# Patient Record
Sex: MALE | Race: WHITE | Employment: OTHER | URBAN - METROPOLITAN AREA
[De-identification: names, ages, dates, MRNs, and addresses within clinical notes are randomized per-mention and may not be internally consistent; named-entity substitution may affect disease eponyms.]

---

## 2021-08-06 ENCOUNTER — HOSPITAL ENCOUNTER (EMERGENCY)
Age: 71
Discharge: NURSING, REHAB OR LONG-TERM CARE | End: 2021-08-07
Attending: EMERGENCY MEDICINE
Payer: MEDICARE

## 2021-08-06 DIAGNOSIS — R31.0 GROSS HEMATURIA: Primary | ICD-10-CM

## 2021-08-06 DIAGNOSIS — T83.511A URINARY TRACT INFECTION ASSOCIATED WITH INDWELLING URETHRAL CATHETER, INITIAL ENCOUNTER (HCC): ICD-10-CM

## 2021-08-06 DIAGNOSIS — N39.0 URINARY TRACT INFECTION ASSOCIATED WITH INDWELLING URETHRAL CATHETER, INITIAL ENCOUNTER (HCC): ICD-10-CM

## 2021-08-06 LAB
ALBUMIN SERPL-MCNC: 2.7 G/DL (ref 3.5–5)
ALBUMIN/GLOB SERPL: 0.7 {RATIO} (ref 1.1–2.2)
ALP SERPL-CCNC: 100 U/L (ref 45–117)
ALT SERPL-CCNC: 16 U/L (ref 12–78)
ANION GAP SERPL CALC-SCNC: 5 MMOL/L (ref 5–15)
AST SERPL-CCNC: 16 U/L (ref 15–37)
BASOPHILS # BLD: 0 K/UL (ref 0–0.1)
BASOPHILS NFR BLD: 0 % (ref 0–1)
BILIRUB SERPL-MCNC: 0.4 MG/DL (ref 0.2–1)
BUN SERPL-MCNC: 16 MG/DL (ref 6–20)
BUN/CREAT SERPL: 46 (ref 12–20)
CALCIUM SERPL-MCNC: 8.2 MG/DL (ref 8.5–10.1)
CHLORIDE SERPL-SCNC: 106 MMOL/L (ref 97–108)
CO2 SERPL-SCNC: 28 MMOL/L (ref 21–32)
COMMENT, HOLDF: NORMAL
CREAT SERPL-MCNC: 0.35 MG/DL (ref 0.7–1.3)
DIFFERENTIAL METHOD BLD: ABNORMAL
EOSINOPHIL # BLD: 0.2 K/UL (ref 0–0.4)
EOSINOPHIL NFR BLD: 3 % (ref 0–7)
ERYTHROCYTE [DISTWIDTH] IN BLOOD BY AUTOMATED COUNT: 17.3 % (ref 11.5–14.5)
GLOBULIN SER CALC-MCNC: 4.1 G/DL (ref 2–4)
GLUCOSE SERPL-MCNC: 100 MG/DL (ref 65–100)
HCT VFR BLD AUTO: 34.6 % (ref 36.6–50.3)
HGB BLD-MCNC: 10.3 G/DL (ref 12.1–17)
IMM GRANULOCYTES # BLD AUTO: 0 K/UL (ref 0–0.04)
IMM GRANULOCYTES NFR BLD AUTO: 0 % (ref 0–0.5)
LYMPHOCYTES # BLD: 1.6 K/UL (ref 0.8–3.5)
LYMPHOCYTES NFR BLD: 20 % (ref 12–49)
MCH RBC QN AUTO: 25.2 PG (ref 26–34)
MCHC RBC AUTO-ENTMCNC: 29.8 G/DL (ref 30–36.5)
MCV RBC AUTO: 84.6 FL (ref 80–99)
MONOCYTES # BLD: 0.7 K/UL (ref 0–1)
MONOCYTES NFR BLD: 9 % (ref 5–13)
NEUTS SEG # BLD: 5.5 K/UL (ref 1.8–8)
NEUTS SEG NFR BLD: 68 % (ref 32–75)
NRBC # BLD: 0 K/UL (ref 0–0.01)
NRBC BLD-RTO: 0 PER 100 WBC
PLATELET # BLD AUTO: 286 K/UL (ref 150–400)
PMV BLD AUTO: 9.5 FL (ref 8.9–12.9)
POTASSIUM SERPL-SCNC: 3.9 MMOL/L (ref 3.5–5.1)
PROT SERPL-MCNC: 6.8 G/DL (ref 6.4–8.2)
RBC # BLD AUTO: 4.09 M/UL (ref 4.1–5.7)
SAMPLES BEING HELD,HOLD: NORMAL
SODIUM SERPL-SCNC: 139 MMOL/L (ref 136–145)
WBC # BLD AUTO: 8 K/UL (ref 4.1–11.1)

## 2021-08-06 PROCEDURE — 99284 EMERGENCY DEPT VISIT MOD MDM: CPT

## 2021-08-06 PROCEDURE — 85025 COMPLETE CBC W/AUTO DIFF WBC: CPT

## 2021-08-06 PROCEDURE — 80053 COMPREHEN METABOLIC PANEL: CPT

## 2021-08-06 PROCEDURE — 36415 COLL VENOUS BLD VENIPUNCTURE: CPT

## 2021-08-07 VITALS
HEART RATE: 84 BPM | WEIGHT: 152.12 LBS | TEMPERATURE: 98.3 F | RESPIRATION RATE: 18 BRPM | BODY MASS INDEX: 20.6 KG/M2 | HEIGHT: 72 IN | DIASTOLIC BLOOD PRESSURE: 60 MMHG | SYSTOLIC BLOOD PRESSURE: 127 MMHG | OXYGEN SATURATION: 100 %

## 2021-08-07 LAB
APPEARANCE UR: ABNORMAL
BACTERIA URNS QL MICRO: NEGATIVE /HPF
BILIRUB UR QL: NEGATIVE
COLOR UR: ABNORMAL
EPITH CASTS URNS QL MICRO: ABNORMAL /LPF
GLUCOSE UR STRIP.AUTO-MCNC: NEGATIVE MG/DL
HGB UR QL STRIP: ABNORMAL
KETONES UR QL STRIP.AUTO: ABNORMAL MG/DL
LEUKOCYTE ESTERASE UR QL STRIP.AUTO: ABNORMAL
NITRITE UR QL STRIP.AUTO: NEGATIVE
PH UR STRIP: 6.5 [PH] (ref 5–8)
PROT UR STRIP-MCNC: 100 MG/DL
RBC #/AREA URNS HPF: >100 /HPF (ref 0–5)
SP GR UR REFRACTOMETRY: 1.01 (ref 1–1.03)
UA: UC IF INDICATED,UAUC: ABNORMAL
UROBILINOGEN UR QL STRIP.AUTO: 0.2 EU/DL (ref 0.2–1)
WBC URNS QL MICRO: ABNORMAL /HPF (ref 0–4)

## 2021-08-07 PROCEDURE — 74011250637 HC RX REV CODE- 250/637: Performed by: EMERGENCY MEDICINE

## 2021-08-07 PROCEDURE — 81001 URINALYSIS AUTO W/SCOPE: CPT

## 2021-08-07 RX ORDER — LEVOFLOXACIN 750 MG/1
750 TABLET ORAL
Status: COMPLETED | OUTPATIENT
Start: 2021-08-07 | End: 2021-08-07

## 2021-08-07 RX ORDER — LEVOFLOXACIN 750 MG/1
750 TABLET ORAL DAILY
Qty: 4 TABLET | Refills: 0 | Status: SHIPPED | OUTPATIENT
Start: 2021-08-07 | End: 2021-08-11

## 2021-08-07 RX ADMIN — LEVOFLOXACIN 750 MG: 750 TABLET, FILM COATED ORAL at 02:23

## 2021-08-07 NOTE — ED NOTES
Pt came to ER for c/c of hematuria. Pt's bladder has been irrigated with 3 way mauro catheter as per order. Pt's urine colour has started to lighten up and not as red as it was POA. Urine flow is steady. Pt discharged with mauro. Called Pt's nursing facility to give report, but nurse was not available. Callback number given.  Pt left with [facility]

## 2021-08-07 NOTE — ED PROVIDER NOTES
EMERGENCY DEPARTMENT HISTORY AND PHYSICAL EXAM      Date: 8/6/2021  Patient Name: Lizzette Koehler    History of Presenting Illness     Chief Complaint   Patient presents with    Blood in Urine     Pt came from a nursing facility for c/c of hematuria. Pt has a chronic mauro and has gross hematuria which started today. Pt has hx of CVA, bed bound, Multiple sclerosis and has bed sores. pt takes 81 mg of aspirin       History Provided By: Patient    HPI: Lizzette Koehler, 80 y.o. male  With past medical history of MS and previous drug presenting today with gross hematuria. The patient does have a chronic indwelling Mauro due to his past history of MS. He started having hematuria today. The patient does not have any change to his baseline pain. He is not having any severe abdominal discomfort. He takes 81 mg of aspirin but no other blood thinners. The patient does not have any further complaints. No fevers or chills. There are no other complaints, changes, or physical findings at this time. PCP: Tim Valente MD    No current facility-administered medications on file prior to encounter. Current Outpatient Medications on File Prior to Encounter   Medication Sig Dispense Refill    glatiramer (COPAXONE) 20 mg/mL injection 20 mg by SubCUTAneous route daily. metoprolol tartrate (LOPRESSOR) 25 mg tablet Take 25 mg by mouth daily. nitrofurantoin (MACRODANTIN) 25 mg capsule Take 25 mg by mouth daily. Indications: PREVENTION OF BACTERIAL URINARY TRACT INFECTION      finasteride (PROSCAR) 5 mg tablet Take 5 mg by mouth daily. tamsulosin (FLOMAX) 0.4 mg capsule Take 0.4 mg by mouth daily. traMADol (ULTRAM) 50 mg tablet Take 1 Tab by mouth every six (6) hours as needed for Pain. Max Daily Amount: 200 mg. 10 Tab 0    levofloxacin (LEVAQUIN) 500 mg tablet Take 1 Tab by mouth daily. 7 Tab 0    Aspirin, Buffered 81 mg tab Take 1 Tab by mouth daily.       ranitidine (ZANTAC) 300 mg tablet Take 300 mg by mouth daily. nitroglycerin (NITROSTAT) 0.4 mg SL tablet by SubLINGual route every five (5) minutes as needed for Chest Pain. acetaminophen (TYLENOL) 325 mg tablet Take  by mouth every four (4) hours as needed for Pain. ibuprofen (MOTRIN) 600 mg tablet Take  by mouth every eight (8) hours as needed for Pain. atorvastatin (LIPITOR) 40 mg tablet Take  by mouth daily. pyridoxine (VITAMIN B-6) 50 mg tablet Take  by mouth daily. Past History     Past Medical History:  Past Medical History:   Diagnosis Date    MS (multiple sclerosis) ([facility] [address]     Stroke [facility])        Past Surgical History:  Past Surgical History:   Procedure Laterality Date    HX CORONARY STENT PLACEMENT      x7        Family History:  No family history on file. Social History:  Social History     Tobacco Use    Smoking status: Never Smoker   Substance Use Topics    Alcohol use: No    Drug use: Not on file       Allergies: Allergies   Allergen Reactions    Beeswax Anaphylaxis    Bee Pollen Unknown (comments)    Simvastatin Not Reported This Time    Tolterodine Not Reported This Time    Venom-Wasp Not Reported This Time         Review of Systems   Constitutional: No  fever  Skin: No  rash  HEENT: No  nasal congestion  Resp: No cough  CV: No chest pain  GI: No vomiting  : No dysuria  MSK: No joint pain  Neuro: No numbness  Psych: No anxiety      Physical Exam     Patient Vitals for the past 12 hrs:   Temp Pulse Resp BP SpO2   08/07/21 0200 98.3 °F (36.8 °C) 84 18 127/60 --   08/06/21 2139 -- -- -- -- 100 %   08/06/21 2138 98.3 °F (36.8 °C) 84 18 130/67 100 %       General: alert, No acute distress  Eyes: EOMI, normal conjunctiva  ENT: moist mucous membranes. Neck: Active, full ROM of neck. Skin: No rashes. no jaundice              Lungs: Equal chest expansion. no respiratory distress.    Heart: regular rate     no peripheral edema    Abd:  non distended soft, mauro catheter in place with gross hematuria  Back: Full ROM  MSK: contractures, muscular atrophy  Neuro: alert  Person, Place, Time, and Situation; normal speech;   Psych: Cooperative with exam; Appropriate mood and affect             Diagnostic Study Results     Labs -     Recent Results (from the past 12 hour(s))   CBC WITH AUTOMATED DIFF    Collection Time: 08/06/21 10:33 PM   Result Value Ref Range    WBC 8.0 4.1 - 11.1 K/uL    RBC 4.09 (L) 4.10 - 5.70 M/uL    HGB 10.3 (L) 12.1 - 17.0 g/dL    HCT 34.6 (L) 36.6 - 50.3 %    MCV 84.6 80.0 - 99.0 FL    MCH 25.2 (L) 26.0 - 34.0 PG    MCHC 29.8 (L) 30.0 - 36.5 g/dL    RDW 17.3 (H) 11.5 - 14.5 %    PLATELET 347 055 - 089 K/uL    MPV 9.5 8.9 - 12.9 FL    NRBC 0.0 0  WBC    ABSOLUTE NRBC 0.00 0.00 - 0.01 K/uL    NEUTROPHILS 68 32 - 75 %    LYMPHOCYTES 20 12 - 49 %    MONOCYTES 9 5 - 13 %    EOSINOPHILS 3 0 - 7 %    BASOPHILS 0 0 - 1 %    IMMATURE GRANULOCYTES 0 0.0 - 0.5 %    ABS. NEUTROPHILS 5.5 1.8 - 8.0 K/UL    ABS. LYMPHOCYTES 1.6 0.8 - 3.5 K/UL    ABS. MONOCYTES 0.7 0.0 - 1.0 K/UL    ABS. EOSINOPHILS 0.2 0.0 - 0.4 K/UL    ABS. BASOPHILS 0.0 0.0 - 0.1 K/UL    ABS. IMM. GRANS. 0.0 0.00 - 0.04 K/UL    DF AUTOMATED     METABOLIC PANEL, COMPREHENSIVE    Collection Time: 08/06/21 10:33 PM   Result Value Ref Range    Sodium 139 136 - 145 mmol/L    Potassium 3.9 3.5 - 5.1 mmol/L    Chloride 106 97 - 108 mmol/L    CO2 28 21 - 32 mmol/L    Anion gap 5 5 - 15 mmol/L    Glucose 100 65 - 100 mg/dL    BUN 16 6 - 20 MG/DL    Creatinine 0.35 (L) 0.70 - 1.30 MG/DL    BUN/Creatinine ratio 46 (H) 12 - 20      GFR est AA >60 >60 ml/min/1.73m2    GFR est non-AA >60 >60 ml/min/1.73m2    Calcium 8.2 (L) 8.5 - 10.1 MG/DL    Bilirubin, total 0.4 0.2 - 1.0 MG/DL    ALT (SGPT) 16 12 - 78 U/L    AST (SGOT) 16 15 - 37 U/L    Alk.  phosphatase 100 45 - 117 U/L    Protein, total 6.8 6.4 - 8.2 g/dL    Albumin 2.7 (L) 3.5 - 5.0 g/dL    Globulin 4.1 (H) 2.0 - 4.0 g/dL    A-G Ratio 0.7 (L) 1.1 - 2.2     SAMPLES BEING HELD    Collection Time: 08/06/21 10:33 PM   Result Value Ref Range    SAMPLES BEING HELD BL     COMMENT        Add-on orders for these samples will be processed based on acceptable specimen integrity and analyte stability, which may vary by analyte. URINALYSIS W/ REFLEX CULTURE    Collection Time: 08/07/21  1:05 AM    Specimen: Urine   Result Value Ref Range    Color RED      Appearance TURBID (A) CLEAR      Specific gravity 1.006 1.003 - 1.030      pH (UA) 6.5 5.0 - 8.0      Protein 100 (A) NEG mg/dL    Glucose Negative NEG mg/dL    Ketone TRACE (A) NEG mg/dL    Bilirubin Negative NEG      Blood LARGE (A) NEG      Urobilinogen 0.2 0.2 - 1.0 EU/dL    Nitrites Negative NEG      Leukocyte Esterase SMALL (A) NEG      WBC 5-10 0 - 4 /hpf    RBC >100 (H) 0 - 5 /hpf    Epithelial cells FEW FEW /lpf    Bacteria Negative NEG /hpf    UA:UC IF INDICATED CULTURE NOT INDICATED BY UA RESULT CNI         Radiologic Studies -   No orders to display     CT Results  (Last 48 hours)      None          CXR Results  (Last 48 hours)      None            Medical Decision Making   I am the first provider for this patient. I reviewed the vital signs, available nursing notes, past medical history, past surgical history, family history and social history. Vital Signs-Reviewed the patient's vital signs. Patient Vitals for the past 12 hrs:   Temp Pulse Resp BP SpO2   08/07/21 0200 98.3 °F (36.8 °C) 84 18 127/60 --   08/06/21 2139 -- -- -- -- 100 %   08/06/21 2138 98.3 °F (36.8 °C) 84 18 130/67 100 %       Provider Notes (Medical Decision Making):     Differential Diagnosis: Anemia, electrolyte abnormality, gross hematuria, urinary tract infection    Initial Plan: Will obtain urinalysis sample, irrigate the patient's [place], obtain laboratory studies and reassess. He is hemodynamically stable without significant symptoms at this time. ED Course:   Initial assessment performed.  The patients presenting problems have been discussed, and they are in agreement with the care plan formulated and outlined with them. I have encouraged them to ask questions as they arise throughout their visit. ED Course as of Aug 07 0421   Sat Aug 07, 2021   0142 On my interpretation of the patient's laboratory studies urinalysis shows evidence of a large amount of blood and also evidence of urinary tract infection, hemoglobin is 10.3, no elevation white blood cell count, metabolic panel unremarkable. [NW]   4714 Patient presenting today with gross hematuria, the bladder was irrigated and bleeding lessened. Patient has evidence of UTI. Will discharge with Levaquin. He has no evidence of a systemic infection. [NW]      ED Course User Index  [NW] Jaime Jefferson MD       I, Imani Zuniga MD, am the attending of record for this patient encounter. Dispo: Discharged. The patient has been re-evaluated and is ready for discharge. Reviewed available results with patient. Counseled patient on diagnosis and care plan. Patient has expressed understanding, and all questions have been answered. Patient agrees with plan and agrees to follow up as recommended, or to return to the ED if their symptoms worsen. Discharge instructions have been provided and explained to the patient, along with reasons to return to the ED. PLAN:  Discharge Medication List as of 8/7/2021  1:42 AM        START taking these medications    Details   !! levoFLOXacin (LEVAQUIN) 750 mg tablet Take 1 Tablet by mouth daily for 4 days. , Print, Disp-4 Tablet, R-0       !! - Potential duplicate medications found. Please discuss with provider. CONTINUE these medications which have NOT CHANGED    Details   glatiramer (COPAXONE) 20 mg/mL injection 20 mg by SubCUTAneous route daily. , Historical Med      metoprolol tartrate (LOPRESSOR) 25 mg tablet Take 25 mg by mouth daily. , Historical Med      nitrofurantoin (MACRODANTIN) 25 mg capsule Take 25 mg by mouth daily.  Indications: PREVENTION OF BACTERIAL URINARY TRACT INFECTION, Historical Med      finasteride (PROSCAR) 5 mg tablet Take 5 mg by mouth daily. , Historical Med      tamsulosin (FLOMAX) 0.4 mg capsule Take 0.4 mg by mouth daily. , Historical Med      traMADol (ULTRAM) 50 mg tablet Take 1 Tab by mouth every six (6) hours as needed for Pain. Max Daily Amount: 200 mg., Print, Disp-10 Tab, R-0      !! levofloxacin (LEVAQUIN) 500 mg tablet Take 1 Tab by mouth daily. , Print, Disp-7 Tab, R-0      Aspirin, Buffered 81 mg tab Take 1 Tab by mouth daily. , Historical Med      ranitidine (ZANTAC) 300 mg tablet Take 300 mg by mouth daily. , Historical Med      nitroglycerin (NITROSTAT) 0.4 mg SL tablet by SubLINGual route every five (5) minutes as needed for Chest Pain., Historical Med      acetaminophen (TYLENOL) 325 mg tablet Take  by mouth every four (4) hours as needed for Pain., Historical Med      ibuprofen (MOTRIN) 600 mg tablet Take  by mouth every eight (8) hours as needed for Pain., Historical Med      atorvastatin (LIPITOR) 40 mg tablet Take  by mouth daily. , Historical Med      pyridoxine (VITAMIN B-6) 50 mg tablet Take  by mouth daily. , Historical Med       !! - Potential duplicate medications found. Please discuss with provider. 2.     Follow-up Information       Follow up With Specialties Details Why Sigifredo, [address] [place] [address] MD [facility]   [address] [place] [address] [phone]      Tressa Hope MD Urology  Hematuria formerly [facility] [address]  [address] [facility]  [phone]            3. Return to ED if worse       Diagnosis     Clinical Impression:   1. Gross hematuria    2. Urinary tract infection associated with indwelling urethral catheter, initial encounter [facility])        Attestations:    Beth Ayoub MD    Please note that this dictation was completed with [employer], the [employer] voice recognition software.   Quite often unanticipated grammatical, syntax, homophones, and other interpretive errors are inadvertently transcribed by the computer software. Please disregard these errors. Please excuse any errors that have escaped final proofreading. Thank you.

## 2022-02-09 ENCOUNTER — APPOINTMENT (OUTPATIENT)
Dept: GENERAL RADIOLOGY | Age: 72
DRG: 871 | End: 2022-02-09
Attending: EMERGENCY MEDICINE
Payer: MEDICARE

## 2022-02-09 ENCOUNTER — HOSPITAL ENCOUNTER (INPATIENT)
Age: 72
LOS: 9 days | Discharge: NURSING, REHAB OR LONG-TERM CARE | DRG: 871 | End: 2022-02-18
Attending: EMERGENCY MEDICINE | Admitting: HOSPITALIST
Payer: MEDICARE

## 2022-02-09 ENCOUNTER — APPOINTMENT (OUTPATIENT)
Dept: CT IMAGING | Age: 72
DRG: 871 | End: 2022-02-09
Attending: EMERGENCY MEDICINE
Payer: MEDICARE

## 2022-02-09 DIAGNOSIS — E43 SEVERE PROTEIN-CALORIE MALNUTRITION (HCC): ICD-10-CM

## 2022-02-09 DIAGNOSIS — I33.0 MITRAL VALVE VEGETATION: ICD-10-CM

## 2022-02-09 DIAGNOSIS — R13.19 ESOPHAGEAL DYSPHAGIA: ICD-10-CM

## 2022-02-09 DIAGNOSIS — G35 MULTIPLE SCLEROSIS (HCC): ICD-10-CM

## 2022-02-09 DIAGNOSIS — R53.2 FUNCTIONAL QUADRIPLEGIA (HCC): ICD-10-CM

## 2022-02-09 DIAGNOSIS — B96.4 URINARY TRACT INFECTION DUE TO PROTEUS: ICD-10-CM

## 2022-02-09 DIAGNOSIS — L89.154 PRESSURE INJURY OF SACRAL REGION, STAGE 4 (HCC): ICD-10-CM

## 2022-02-09 DIAGNOSIS — L03.317 CELLULITIS OF BUTTOCK: Primary | ICD-10-CM

## 2022-02-09 DIAGNOSIS — R05.3 CHRONIC COUGH: ICD-10-CM

## 2022-02-09 DIAGNOSIS — T83.518A CATHETER CYSTITIS, INITIAL ENCOUNTER (HCC): ICD-10-CM

## 2022-02-09 DIAGNOSIS — N31.9 NEUROGENIC BLADDER: ICD-10-CM

## 2022-02-09 DIAGNOSIS — N30.90 CATHETER CYSTITIS, INITIAL ENCOUNTER (HCC): ICD-10-CM

## 2022-02-09 DIAGNOSIS — N39.0 URINARY TRACT INFECTION DUE TO PROTEUS: ICD-10-CM

## 2022-02-09 DIAGNOSIS — R78.81 STREPTOCOCCAL BACTEREMIA: ICD-10-CM

## 2022-02-09 DIAGNOSIS — M86.60 CHRONIC OSTEOMYELITIS (HCC): ICD-10-CM

## 2022-02-09 DIAGNOSIS — A49.8 PROTEUS MIRABILIS INFECTION: ICD-10-CM

## 2022-02-09 DIAGNOSIS — A49.9 GRAM-NEGATIVE INFECTION: ICD-10-CM

## 2022-02-09 DIAGNOSIS — R78.81 GRAM-NEGATIVE BACTEREMIA: ICD-10-CM

## 2022-02-09 DIAGNOSIS — M46.28 SACRAL OSTEOMYELITIS (HCC): ICD-10-CM

## 2022-02-09 DIAGNOSIS — R78.81 BACTEREMIA DUE TO GRAM-NEGATIVE BACTERIA: ICD-10-CM

## 2022-02-09 DIAGNOSIS — B95.5 STREPTOCOCCAL BACTEREMIA: ICD-10-CM

## 2022-02-09 DIAGNOSIS — M86.159 ACUTE OSTEOMYELITIS OF PELVIC REGION, UNSPECIFIED LATERALITY (HCC): ICD-10-CM

## 2022-02-09 PROBLEM — A41.9 SEPSIS (HCC): Status: ACTIVE | Noted: 2022-02-09

## 2022-02-09 PROBLEM — L89.159 SACRAL PRESSURE ULCER: Status: ACTIVE | Noted: 2022-02-09

## 2022-02-09 LAB
ALBUMIN SERPL-MCNC: 2.3 G/DL (ref 3.5–5)
ALBUMIN/GLOB SERPL: 0.5 {RATIO} (ref 1.1–2.2)
ALP SERPL-CCNC: 90 U/L (ref 45–117)
ALT SERPL-CCNC: 15 U/L (ref 12–78)
ANION GAP SERPL CALC-SCNC: 5 MMOL/L (ref 5–15)
APPEARANCE UR: ABNORMAL
AST SERPL-CCNC: 24 U/L (ref 15–37)
BACTERIA URNS QL MICRO: ABNORMAL /HPF
BASOPHILS # BLD: 0 K/UL (ref 0–0.1)
BASOPHILS NFR BLD: 0 % (ref 0–1)
BILIRUB SERPL-MCNC: 0.4 MG/DL (ref 0.2–1)
BILIRUB UR QL: NEGATIVE
BUN SERPL-MCNC: 23 MG/DL (ref 6–20)
BUN/CREAT SERPL: 59 (ref 12–20)
CALCIUM SERPL-MCNC: 8.3 MG/DL (ref 8.5–10.1)
CHLORIDE SERPL-SCNC: 100 MMOL/L (ref 97–108)
CO2 SERPL-SCNC: 31 MMOL/L (ref 21–32)
COLOR UR: ABNORMAL
COVID-19 RAPID TEST, COVR: NOT DETECTED
CREAT SERPL-MCNC: 0.39 MG/DL (ref 0.7–1.3)
DIFFERENTIAL METHOD BLD: ABNORMAL
EOSINOPHIL # BLD: 0 K/UL (ref 0–0.4)
EOSINOPHIL NFR BLD: 0 % (ref 0–7)
EPITH CASTS URNS QL MICRO: ABNORMAL /LPF
ERYTHROCYTE [DISTWIDTH] IN BLOOD BY AUTOMATED COUNT: 16.9 % (ref 11.5–14.5)
GLOBULIN SER CALC-MCNC: 4.6 G/DL (ref 2–4)
GLUCOSE SERPL-MCNC: 110 MG/DL (ref 65–100)
GLUCOSE UR STRIP.AUTO-MCNC: NEGATIVE MG/DL
HCT VFR BLD AUTO: 33.1 % (ref 36.6–50.3)
HGB BLD-MCNC: 10.2 G/DL (ref 12.1–17)
HGB UR QL STRIP: ABNORMAL
IMM GRANULOCYTES # BLD AUTO: 0.1 K/UL (ref 0–0.04)
IMM GRANULOCYTES NFR BLD AUTO: 1 % (ref 0–0.5)
KETONES UR QL STRIP.AUTO: 15 MG/DL
LEUKOCYTE ESTERASE UR QL STRIP.AUTO: ABNORMAL
LYMPHOCYTES # BLD: 0.4 K/UL (ref 0.8–3.5)
LYMPHOCYTES NFR BLD: 3 % (ref 12–49)
MCH RBC QN AUTO: 23.7 PG (ref 26–34)
MCHC RBC AUTO-ENTMCNC: 30.8 G/DL (ref 30–36.5)
MCV RBC AUTO: 76.8 FL (ref 80–99)
MONOCYTES # BLD: 0.6 K/UL (ref 0–1)
MONOCYTES NFR BLD: 5 % (ref 5–13)
NEUTS SEG # BLD: 10.7 K/UL (ref 1.8–8)
NEUTS SEG NFR BLD: 91 % (ref 32–75)
NITRITE UR QL STRIP.AUTO: NEGATIVE
NRBC # BLD: 0 K/UL (ref 0–0.01)
NRBC BLD-RTO: 0 PER 100 WBC
PH UR STRIP: 8 [PH] (ref 5–8)
PLATELET # BLD AUTO: 346 K/UL (ref 150–400)
PMV BLD AUTO: 9.8 FL (ref 8.9–12.9)
POTASSIUM SERPL-SCNC: 4.2 MMOL/L (ref 3.5–5.1)
PROT SERPL-MCNC: 6.9 G/DL (ref 6.4–8.2)
PROT UR STRIP-MCNC: 100 MG/DL
RBC # BLD AUTO: 4.31 M/UL (ref 4.1–5.7)
RBC #/AREA URNS HPF: ABNORMAL /HPF (ref 0–5)
RBC MORPH BLD: ABNORMAL
RBC MORPH BLD: ABNORMAL
SODIUM SERPL-SCNC: 136 MMOL/L (ref 136–145)
SOURCE, COVRS: NORMAL
SP GR UR REFRACTOMETRY: 1.03 (ref 1–1.03)
TROPONIN-HIGH SENSITIVITY: 14 NG/L (ref 0–76)
UA: UC IF INDICATED,UAUC: ABNORMAL
UROBILINOGEN UR QL STRIP.AUTO: 1 EU/DL (ref 0.2–1)
WBC # BLD AUTO: 11.8 K/UL (ref 4.1–11.1)
WBC URNS QL MICRO: ABNORMAL /HPF (ref 0–4)

## 2022-02-09 PROCEDURE — 96367 TX/PROPH/DG ADDL SEQ IV INF: CPT

## 2022-02-09 PROCEDURE — 71045 X-RAY EXAM CHEST 1 VIEW: CPT

## 2022-02-09 PROCEDURE — 87635 SARS-COV-2 COVID-19 AMP PRB: CPT

## 2022-02-09 PROCEDURE — 83605 ASSAY OF LACTIC ACID: CPT

## 2022-02-09 PROCEDURE — 87077 CULTURE AEROBIC IDENTIFY: CPT

## 2022-02-09 PROCEDURE — 87040 BLOOD CULTURE FOR BACTERIA: CPT

## 2022-02-09 PROCEDURE — 81001 URINALYSIS AUTO W/SCOPE: CPT

## 2022-02-09 PROCEDURE — 87186 SC STD MICRODIL/AGAR DIL: CPT

## 2022-02-09 PROCEDURE — 65270000029 HC RM PRIVATE

## 2022-02-09 PROCEDURE — 87086 URINE CULTURE/COLONY COUNT: CPT

## 2022-02-09 PROCEDURE — 84484 ASSAY OF TROPONIN QUANT: CPT

## 2022-02-09 PROCEDURE — 96365 THER/PROPH/DIAG IV INF INIT: CPT

## 2022-02-09 PROCEDURE — 85025 COMPLETE CBC W/AUTO DIFF WBC: CPT

## 2022-02-09 PROCEDURE — 36415 COLL VENOUS BLD VENIPUNCTURE: CPT

## 2022-02-09 PROCEDURE — 74011000636 HC RX REV CODE- 636: Performed by: EMERGENCY MEDICINE

## 2022-02-09 PROCEDURE — 99285 EMERGENCY DEPT VISIT HI MDM: CPT

## 2022-02-09 PROCEDURE — 74011250636 HC RX REV CODE- 250/636: Performed by: EMERGENCY MEDICINE

## 2022-02-09 PROCEDURE — 74011000258 HC RX REV CODE- 258: Performed by: EMERGENCY MEDICINE

## 2022-02-09 PROCEDURE — 72193 CT PELVIS W/DYE: CPT

## 2022-02-09 PROCEDURE — 93005 ELECTROCARDIOGRAM TRACING: CPT

## 2022-02-09 PROCEDURE — 80053 COMPREHEN METABOLIC PANEL: CPT

## 2022-02-09 PROCEDURE — 96368 THER/DIAG CONCURRENT INF: CPT

## 2022-02-09 RX ORDER — SERTRALINE HYDROCHLORIDE 25 MG/1
25 TABLET, FILM COATED ORAL DAILY
Status: DISCONTINUED | OUTPATIENT
Start: 2022-02-10 | End: 2022-02-18 | Stop reason: HOSPADM

## 2022-02-09 RX ORDER — DEXTROSE MONOHYDRATE 100 MG/ML
250 INJECTION, SOLUTION INTRAVENOUS AS NEEDED
Status: DISCONTINUED | OUTPATIENT
Start: 2022-02-09 | End: 2022-02-10 | Stop reason: SDUPTHER

## 2022-02-09 RX ORDER — OXYCODONE HYDROCHLORIDE 5 MG/1
5 TABLET ORAL
Status: DISCONTINUED | OUTPATIENT
Start: 2022-02-09 | End: 2022-02-10

## 2022-02-09 RX ORDER — GUAIFENESIN 100 MG/5ML
200 SOLUTION ORAL
Status: DISCONTINUED | OUTPATIENT
Start: 2022-02-09 | End: 2022-02-18 | Stop reason: HOSPADM

## 2022-02-09 RX ORDER — VANCOMYCIN 1.75 GRAM/500 ML IN 0.9 % SODIUM CHLORIDE INTRAVENOUS
1750
Status: COMPLETED | OUTPATIENT
Start: 2022-02-09 | End: 2022-02-09

## 2022-02-09 RX ORDER — INSULIN LISPRO 100 [IU]/ML
INJECTION, SOLUTION INTRAVENOUS; SUBCUTANEOUS
Status: DISCONTINUED | OUTPATIENT
Start: 2022-02-09 | End: 2022-02-18 | Stop reason: HOSPADM

## 2022-02-09 RX ORDER — IPRATROPIUM BROMIDE AND ALBUTEROL SULFATE 2.5; .5 MG/3ML; MG/3ML
3 SOLUTION RESPIRATORY (INHALATION)
Status: DISCONTINUED | OUTPATIENT
Start: 2022-02-09 | End: 2022-02-18 | Stop reason: HOSPADM

## 2022-02-09 RX ORDER — DRONABINOL 2.5 MG/1
2.5 CAPSULE ORAL
Status: DISCONTINUED | OUTPATIENT
Start: 2022-02-10 | End: 2022-02-18 | Stop reason: HOSPADM

## 2022-02-09 RX ORDER — MIRTAZAPINE 15 MG/1
30 TABLET, FILM COATED ORAL
Status: DISCONTINUED | OUTPATIENT
Start: 2022-02-09 | End: 2022-02-18 | Stop reason: HOSPADM

## 2022-02-09 RX ORDER — SODIUM CHLORIDE 0.9 % (FLUSH) 0.9 %
5-10 SYRINGE (ML) INJECTION AS NEEDED
Status: DISCONTINUED | OUTPATIENT
Start: 2022-02-09 | End: 2022-02-18 | Stop reason: HOSPADM

## 2022-02-09 RX ORDER — ENOXAPARIN SODIUM 100 MG/ML
40 INJECTION SUBCUTANEOUS DAILY
Status: DISCONTINUED | OUTPATIENT
Start: 2022-02-10 | End: 2022-02-18 | Stop reason: HOSPADM

## 2022-02-09 RX ORDER — GLATIRAMER ACETATE 20 MG/ML
20 INJECTION, SOLUTION SUBCUTANEOUS
Status: DISCONTINUED | OUTPATIENT
Start: 2022-02-11 | End: 2022-02-18 | Stop reason: HOSPADM

## 2022-02-09 RX ORDER — ACETAMINOPHEN 325 MG/1
650 TABLET ORAL
Status: DISCONTINUED | OUTPATIENT
Start: 2022-02-09 | End: 2022-02-14

## 2022-02-09 RX ORDER — ONDANSETRON 2 MG/ML
4 INJECTION INTRAMUSCULAR; INTRAVENOUS
Status: DISCONTINUED | OUTPATIENT
Start: 2022-02-09 | End: 2022-02-18 | Stop reason: HOSPADM

## 2022-02-09 RX ORDER — LEVOFLOXACIN 5 MG/ML
750 INJECTION, SOLUTION INTRAVENOUS EVERY 24 HOURS
Status: DISCONTINUED | OUTPATIENT
Start: 2022-02-09 | End: 2022-02-11

## 2022-02-09 RX ORDER — MAGNESIUM SULFATE 100 %
4 CRYSTALS MISCELLANEOUS AS NEEDED
Status: DISCONTINUED | OUTPATIENT
Start: 2022-02-09 | End: 2022-02-18 | Stop reason: HOSPADM

## 2022-02-09 RX ORDER — AMOXICILLIN 250 MG
2 CAPSULE ORAL
Status: DISCONTINUED | OUTPATIENT
Start: 2022-02-09 | End: 2022-02-18 | Stop reason: HOSPADM

## 2022-02-09 RX ORDER — GUAIFENESIN 100 MG/5ML
81 LIQUID (ML) ORAL DAILY
Status: DISCONTINUED | OUTPATIENT
Start: 2022-02-10 | End: 2022-02-18 | Stop reason: HOSPADM

## 2022-02-09 RX ORDER — SODIUM CHLORIDE 9 MG/ML
50 INJECTION, SOLUTION INTRAVENOUS CONTINUOUS
Status: DISCONTINUED | OUTPATIENT
Start: 2022-02-09 | End: 2022-02-17

## 2022-02-09 RX ORDER — LEVOFLOXACIN 5 MG/ML
750 INJECTION, SOLUTION INTRAVENOUS
Status: DISCONTINUED | OUTPATIENT
Start: 2022-02-09 | End: 2022-02-10 | Stop reason: SDUPTHER

## 2022-02-09 RX ORDER — PANTOPRAZOLE SODIUM 40 MG/1
40 TABLET, DELAYED RELEASE ORAL
Status: DISCONTINUED | OUTPATIENT
Start: 2022-02-10 | End: 2022-02-18 | Stop reason: HOSPADM

## 2022-02-09 RX ADMIN — VANCOMYCIN HYDROCHLORIDE 1750 MG: 100 INJECTION, POWDER, LYOPHILIZED, FOR SOLUTION INTRAVENOUS at 21:19

## 2022-02-09 RX ADMIN — SODIUM CHLORIDE 1000 ML: 9 INJECTION, SOLUTION INTRAVENOUS at 19:38

## 2022-02-09 RX ADMIN — CEFEPIME HYDROCHLORIDE 2 G: 2 INJECTION, POWDER, FOR SOLUTION INTRAVENOUS at 19:37

## 2022-02-09 RX ADMIN — IOPAMIDOL 100 ML: 755 INJECTION, SOLUTION INTRAVENOUS at 20:43

## 2022-02-09 RX ADMIN — LEVOFLOXACIN 750 MG: 5 INJECTION, SOLUTION INTRAVENOUS at 20:18

## 2022-02-09 NOTE — ED PROVIDER NOTES
EMERGENCY DEPARTMENT HISTORY AND PHYSICAL EXAM      Date: 2/9/2022  Patient Name: Johan Berkowitz  Patient Age and Sex: 80 y.o. male     History of Presenting Illness     Chief Complaint   Patient presents with    Wound Check     Pt BIB EMS from [address] for eval of multiple bed sores (hips, buttock, feet)     History Provided By: Patient    HPI: Johan Berkowitz is a 80year old history DM, MS, Stroke presenting with wounds and fever. Patient is a resident of [facility], found to have fever and tachycardia this AM with worsening of his chronic wounds to sacrum, heels. Patient sating 90% on room air, placed on o2. Only oriented to self, stable neuro exam. Patient tachycardic to 126, /70, fecvbrile to 101.6 en route. Unknown vaccination status. Patient reports pain to his bottom which has been worsening over the past week approximately. There are no other complaints, changes, or physical findings at this time. PCP: Ami Nguyen MD    No current facility-administered medications on file prior to encounter. Current Outpatient Medications on File Prior to Encounter   Medication Sig Dispense Refill    glatiramer (COPAXONE) 20 mg/mL injection 20 mg by SubCUTAneous route daily.  metoprolol tartrate (LOPRESSOR) 25 mg tablet Take 25 mg by mouth daily.  nitrofurantoin (MACRODANTIN) 25 mg capsule Take 25 mg by mouth daily. Indications: PREVENTION OF BACTERIAL URINARY TRACT INFECTION      finasteride (PROSCAR) 5 mg tablet Take 5 mg by mouth daily.  tamsulosin (FLOMAX) 0.4 mg capsule Take 0.4 mg by mouth daily.  traMADol (ULTRAM) 50 mg tablet Take 1 Tab by mouth every six (6) hours as needed for Pain. Max Daily Amount: 200 mg. 10 Tab 0    levofloxacin (LEVAQUIN) 500 mg tablet Take 1 Tab by mouth daily. 7 Tab 0    Aspirin, Buffered 81 mg tab Take 1 Tab by mouth daily.  ranitidine (ZANTAC) 300 mg tablet Take 300 mg by mouth daily.       nitroglycerin (NITROSTAT) 0.4 mg SL tablet by SubLINGual route every five (5) minutes as needed for Chest Pain.  acetaminophen (TYLENOL) 325 mg tablet Take  by mouth every four (4) hours as needed for Pain.  ibuprofen (MOTRIN) 600 mg tablet Take  by mouth every eight (8) hours as needed for Pain.  atorvastatin (LIPITOR) 40 mg tablet Take  by mouth daily.  pyridoxine (VITAMIN B-6) 50 mg tablet Take  by mouth daily. Past History     Past Medical History:  Past Medical History:   Diagnosis Date    MS [facility] sclerosis) ([facility] [address]     [facility])        Past Surgical History:  Past Surgical History:   Procedure Laterality Date    HX CORONARY STENT PLACEMENT      x7        Family History:  No family history on file. Social History:  Social History     Tobacco Use    Smoking status: Never Smoker    Smokeless tobacco: Not on file   Substance Use Topics    Alcohol use: No    Drug use: Not on file       Allergies: Allergies   Allergen Reactions    Beeswax Anaphylaxis    Bee Pollen Unknown (comments)    Simvastatin Not Reported This Time    Tolterodine Not Reported This Time    Venom-Wasp Not Reported This Time       Review of Systems   Review of Systems   Constitutional: Positive for fever. Negative for chills. HENT: Negative for congestion and rhinorrhea. Respiratory: Negative for shortness of breath. Cardiovascular: Negative for chest pain. Gastrointestinal: Negative for abdominal pain, diarrhea, nausea and vomiting. Genitourinary: Negative for dysuria and frequency. Musculoskeletal: Negative for myalgias. Skin: Positive for rash and wound. Neurological: Negative for weakness and numbness. All other systems reviewed and are negative. Physical Exam   Physical Exam  Constitutional:       Appearance: He is ill-appearing. HENT:      Head: Normocephalic and atraumatic. Nose: Nose normal.      Mouth/Throat:      Mouth: Mucous membranes are dry.    Eyes:      Pupils: Pupils are equal, round, and reactive to light. Cardiovascular:      Rate and Rhythm: Regular rhythm. Tachycardia present. Pulses: Normal pulses. Pulmonary:      Effort: Pulmonary effort is normal.      Breath sounds: Normal breath sounds. Abdominal:      General: Abdomen is flat. Palpations: Abdomen is soft. Genitourinary:     Comments: Guthrie catheter in place draining dark urine  Musculoskeletal:      Cervical back: Normal range of motion. Comments: Diffuse spasticity, weakness   Skin:     General: Skin is warm and dry. Comments: Deep wounds to sacrum and bilateral ischium malodorous with necrosis to the sacral wound, purulent discharge to bilateral ischial wounds   Neurological:      Mental Status: He is alert. Mental status is at baseline.       Comments: Oriented to self   Psychiatric:         Behavior: Behavior normal.          Diagnostic Study Results     Labs  Recent Results (from the past 12 hour(s))   URINALYSIS W/ REFLEX CULTURE    Collection Time: 02/09/22  7:20 PM    Specimen: Urine   Result Value Ref Range    Color DARK YELLOW      Appearance TURBID (A) CLEAR      Specific gravity 1.030 1.003 - 1.030      pH (UA) 8.0 5.0 - 8.0      Protein 100 (A) NEG mg/dL    Glucose Negative NEG mg/dL    Ketone 15 (A) NEG mg/dL    Bilirubin Negative NEG      Blood MODERATE (A) NEG      Urobilinogen 1.0 0.2 - 1.0 EU/dL    Nitrites Negative NEG      Leukocyte Esterase LARGE (A) NEG      WBC 10-20 0 - 4 /hpf    RBC 0-5 0 - 5 /hpf    Epithelial cells FEW FEW /lpf    Bacteria 1+ (A) NEG /hpf    UA:UC IF INDICATED URINE CULTURE ORDERED (A) CNI     METABOLIC PANEL, COMPREHENSIVE    Collection Time: 02/09/22  7:20 PM   Result Value Ref Range    Sodium 136 136 - 145 mmol/L    Potassium 4.2 3.5 - 5.1 mmol/L    Chloride 100 97 - 108 mmol/L    CO2 31 21 - 32 mmol/L    Anion gap 5 5 - 15 mmol/L    Glucose 110 (H) 65 - 100 mg/dL    BUN 23 (H) 6 - 20 MG/DL    Creatinine 0.39 (L) 0.70 - 1.30 MG/DL    BUN/Creatinine ratio 59 (H) 12 - 20      GFR est AA >60 >60 ml/min/1.73m2    GFR est non-AA >60 >60 ml/min/1.73m2    Calcium 8.3 (L) 8.5 - 10.1 MG/DL    Bilirubin, total 0.4 0.2 - 1.0 MG/DL    ALT (SGPT) 15 12 - 78 U/L    AST (SGOT) 24 15 - 37 U/L    Alk. phosphatase 90 45 - 117 U/L    Protein, total 6.9 6.4 - 8.2 g/dL    Albumin 2.3 (L) 3.5 - 5.0 g/dL    Globulin 4.6 (H) 2.0 - 4.0 g/dL    A-G Ratio 0.5 (L) 1.1 - 2.2     CBC WITH AUTOMATED DIFF    Collection Time: 02/09/22  7:20 PM   Result Value Ref Range    WBC 11.8 (H) 4.1 - 11.1 K/uL    RBC 4.31 4.10 - 5.70 M/uL    HGB 10.2 (L) 12.1 - 17.0 g/dL    HCT 33.1 (L) 36.6 - 50.3 %    MCV 76.8 (L) 80.0 - 99.0 FL    MCH 23.7 (L) 26.0 - 34.0 PG    MCHC 30.8 30.0 - 36.5 g/dL    RDW 16.9 (H) 11.5 - 14.5 %    PLATELET 911 625 - 208 K/uL    MPV 9.8 8.9 - 12.9 FL    NRBC 0.0 0  WBC    ABSOLUTE NRBC 0.00 0.00 - 0.01 K/uL    NEUTROPHILS 91 (H) 32 - 75 %    LYMPHOCYTES 3 (L) 12 - 49 %    MONOCYTES 5 5 - 13 %    EOSINOPHILS 0 0 - 7 %    BASOPHILS 0 0 - 1 %    IMMATURE GRANULOCYTES 1 (H) 0.0 - 0.5 %    ABS. NEUTROPHILS 10.7 (H) 1.8 - 8.0 K/UL    ABS. LYMPHOCYTES 0.4 (L) 0.8 - 3.5 K/UL    ABS. MONOCYTES 0.6 0.0 - 1.0 K/UL    ABS. EOSINOPHILS 0.0 0.0 - 0.4 K/UL    ABS. BASOPHILS 0.0 0.0 - 0.1 K/UL    ABS. IMM. GRANS. 0.1 (H) 0.00 - 0.04 K/UL    DF SMEAR SCANNED      RBC COMMENTS ANISOCYTOSIS  1+        RBC COMMENTS HYPOCHROMIA  1+       TROPONIN-HIGH SENSITIVITY    Collection Time: 02/09/22  7:20 PM   Result Value Ref Range    Troponin-High Sensitivity 14 0 - 76 ng/L   COVID-19 RAPID TEST    Collection Time: 02/09/22  7:20 PM   Result Value Ref Range    Specimen source Nasopharyngeal      COVID-19 rapid test Not detected NOTD       Radiologic Studies  CT PELV W CONT   Final Result   1. Deep soft tissue ulcerations extending to the bilateral ischial tuberosities   with associated acute osteomyelitis.    2.  Chronic osteomyelitis involving the sacrococcygeal junction with overlying   soft tissue ulceration and abscess. 3.  Bilateral femoral head avascular necrosis with subtle subchondral collapse      XR CHEST PORT   Final Result   No consolidative pneumonia. Redemonstrated suspected right lung   fibrotic changes. CT Results  (Last 48 hours)               02/09/22 2043  CT PELV W CONT Final result    Impression:  1. Deep soft tissue ulcerations extending to the bilateral ischial tuberosities   with associated acute osteomyelitis. 2.  Chronic osteomyelitis involving the sacrococcygeal junction with overlying   soft tissue ulceration and abscess. 3.  Bilateral femoral head avascular necrosis with subtle subchondral collapse       Narrative:  EXAM: CT PELV W CONT       INDICATION: Known osteomyelitis       COMPARISON: CT abdomen and pelvis 7/20/2016. IV CONTRAST: 100 mL of Isovue-370. ORAL CONTRAST: None       TECHNIQUE:    Multislice helical CT of the pelvis was performed from the iliac crest to the   symphysis pubis during uneventful rapid bolus intravenous contrast   administration. Coronal and sagittal reformations were generated. CT dose   reduction was achieved through use of a standardized protocol tailored for this   examination and automatic exposure control for dose modulation. FINDINGS:   Bowel: Partially imaged, within normal limits. Reproductive organs: Within normal limits. Soft tissues: No pelvic mass or lymphadenopathy. Fluid: No ascites or drainable fluid collection       Bones: Chronic appearing erosive changes and fragmentation at the sacrococcygeal   junction. Approximately 6.2 cm x 0.8 cm x 10.7 cm amorphous thick-walled mottled   fluid and gas collection in the presacral soft tissues with overlying skin   ulceration, likely an abscess. Deep soft tissue ulceration extending to the   bilateral ischial tuberosities. No aggressive patchy osteolysis at ischial   tuberosities concerning for acute osteoarthritis.  Degenerative changes in the spine. Bilateral femoral head avascular necrosis with subtle subchondral   collapse       Miscellaneous: Atherosclerosis. Bladder decompressed with Guthrie in place               CXR Results  (Last 48 hours)               02/09/22 1855  XR CHEST PORT Final result    Impression:  No consolidative pneumonia. Redemonstrated suspected right lung   fibrotic changes. Narrative:  EXAM:  XR CHEST PORT       INDICATION: Eval for infiltrate       COMPARISON: Chest radiographs 7/20/2016       TECHNIQUE: Semiupright portable chest AP view       FINDINGS:        Cardiac silhouette within normal limits. Aorta is tortuous. Several irregular   linear opacities in the right mid and upper lung, again may reflect fibrosis. No   focal consolidation. No definite pleural effusion or pneumothorax. Medical Decision Making   I am the first provider for this patient. I reviewed the vital signs, available nursing notes, past medical history, past surgical history, family history and social history. Vital Signs-Reviewed the patient's vital signs. Patient Vitals for the past 12 hrs:   Temp Pulse Resp BP SpO2   02/09/22 1957    (!) 120/55 100 %   02/09/22 1927    110/60 100 %   02/09/22 1857    103/63 100 %   02/09/22 1826 (!) (P) 101.6 °F (38.7 °C) (!) (P) 126 (P) 20 (P) 100/69 (P) 98 %     Records Reviewed: Nursing Notes and Old Medical Records    Provider Notes (Medical Decision Making):   Patient presented with likely sepsis of  skin and soft tissue origin, will obtain chest x-ray, urinalysis, blood cultures    Will treat empirically with pseudomonal coverage, MRSA coverage given history of MRSA pneumonia and skin source. Will obtain CT bony pelvis given known history of osteomyelitis. Will give fluid resuscitation for initial elevated shock index. Blood cultures and lactic acid as well as 1 L IV fluid ordered on patient arrival.    ED Course:   Initial assessment performed.  The patients presenting problems have been discussed, and they are in agreement with the care plan formulated and outlined with them. I have encouraged them to ask questions as they arise throughout their visit. ED Course as of 02/09/22 2345 Wed Feb 09, 2022   Jarod Sherwood, brother for an update. History MS, CVA, reports large wound in sacrum, two additional wounds on his ischium to bone. Niece is in healthcare and is more aware of patient's situation, number [phone] Tabitha Whiteside) [WB]   2919 Called niece Tabitha Whiteside NP. Ischial wounds are less than a month old, recently discovered that they now involve the bone. Increased drainage over the last week to one of the wounds.  [WB]   1842 History of MRSA infection, pseudomonal pneumonia [WB]   1943 Chest x-ray demonstrates no acute consolidation [WB]   2134 CT report of acute osteomyelitis of bilateral ischial tuberosities [WB]      ED Course User Index  [WB] Solange Conway MD     Disposition:  Admission Note:  Patient is being admitted to the hospital by Dr. Jennifer Salas, Service: Hospitalist.  The results of their tests and reasons for their admission have been discussed with them and available family. They convey agreement and understanding for the need to be admitted and for their admission diagnosis. Diagnosis     Clinical Impression:   1. Cellulitis of buttock    2. Acute osteomyelitis of pelvic region, unspecified laterality [facility])      Attestations:    Buffy Mckinney M.D. Please note that this dictation was completed with [employer], the computer voice recognition software. Quite often unanticipated grammatical, syntax, homophones, and other interpretive errors are inadvertently transcribed by the computer software. Please disregard these errors. Please excuse any errors that have escaped final proofreading. Thank you.

## 2022-02-10 LAB
ALBUMIN SERPL-MCNC: 1.9 G/DL (ref 3.5–5)
ALBUMIN/GLOB SERPL: 0.4 {RATIO} (ref 1.1–2.2)
ALP SERPL-CCNC: 70 U/L (ref 45–117)
ALT SERPL-CCNC: 16 U/L (ref 12–78)
ANION GAP SERPL CALC-SCNC: 5 MMOL/L (ref 5–15)
AST SERPL-CCNC: 33 U/L (ref 15–37)
ATRIAL RATE: 97 BPM
BASOPHILS # BLD: 0 K/UL (ref 0–0.1)
BASOPHILS NFR BLD: 0 % (ref 0–1)
BILIRUB SERPL-MCNC: 0.3 MG/DL (ref 0.2–1)
BUN SERPL-MCNC: 16 MG/DL (ref 6–20)
BUN/CREAT SERPL: 47 (ref 12–20)
CALCIUM SERPL-MCNC: 7.8 MG/DL (ref 8.5–10.1)
CALCULATED R AXIS, ECG10: -29 DEGREES
CALCULATED T AXIS, ECG11: 91 DEGREES
CHLORIDE SERPL-SCNC: 106 MMOL/L (ref 97–108)
CO2 SERPL-SCNC: 27 MMOL/L (ref 21–32)
CREAT SERPL-MCNC: 0.34 MG/DL (ref 0.7–1.3)
DIAGNOSIS, 93000: NORMAL
DIFFERENTIAL METHOD BLD: ABNORMAL
EOSINOPHIL # BLD: 0 K/UL (ref 0–0.4)
EOSINOPHIL NFR BLD: 0 % (ref 0–7)
ERYTHROCYTE [DISTWIDTH] IN BLOOD BY AUTOMATED COUNT: 17.1 % (ref 11.5–14.5)
GLOBULIN SER CALC-MCNC: 4.4 G/DL (ref 2–4)
GLUCOSE BLD STRIP.AUTO-MCNC: 103 MG/DL (ref 65–117)
GLUCOSE BLD STRIP.AUTO-MCNC: 110 MG/DL (ref 65–117)
GLUCOSE BLD STRIP.AUTO-MCNC: 117 MG/DL (ref 65–117)
GLUCOSE BLD STRIP.AUTO-MCNC: 87 MG/DL (ref 65–117)
GLUCOSE SERPL-MCNC: 90 MG/DL (ref 65–100)
HCT VFR BLD AUTO: 27.5 % (ref 36.6–50.3)
HGB BLD-MCNC: 8.3 G/DL (ref 12.1–17)
IMM GRANULOCYTES # BLD AUTO: 0.1 K/UL (ref 0–0.04)
IMM GRANULOCYTES NFR BLD AUTO: 1 % (ref 0–0.5)
INR PPP: 1.3 (ref 0.9–1.1)
LYMPHOCYTES # BLD: 0.5 K/UL (ref 0.8–3.5)
LYMPHOCYTES NFR BLD: 7 % (ref 12–49)
MAGNESIUM SERPL-MCNC: 1.9 MG/DL (ref 1.6–2.4)
MCH RBC QN AUTO: 23.7 PG (ref 26–34)
MCHC RBC AUTO-ENTMCNC: 30.2 G/DL (ref 30–36.5)
MCV RBC AUTO: 78.6 FL (ref 80–99)
MONOCYTES # BLD: 0.4 K/UL (ref 0–1)
MONOCYTES NFR BLD: 6 % (ref 5–13)
NEUTS SEG # BLD: 5.6 K/UL (ref 1.8–8)
NEUTS SEG NFR BLD: 86 % (ref 32–75)
NRBC # BLD: 0 K/UL (ref 0–0.01)
NRBC BLD-RTO: 0 PER 100 WBC
P-R INTERVAL, ECG05: 128 MS
PHOSPHATE SERPL-MCNC: 2.6 MG/DL (ref 2.6–4.7)
PLATELET # BLD AUTO: 302 K/UL (ref 150–400)
PMV BLD AUTO: 9.9 FL (ref 8.9–12.9)
POTASSIUM SERPL-SCNC: 3.6 MMOL/L (ref 3.5–5.1)
PROCALCITONIN SERPL-MCNC: 4.26 NG/ML
PROT SERPL-MCNC: 6.3 G/DL (ref 6.4–8.2)
PROTHROMBIN TIME: 13.2 SEC (ref 9–11.1)
Q-T INTERVAL, ECG07: 366 MS
QRS DURATION, ECG06: 98 MS
QTC CALCULATION (BEZET), ECG08: 464 MS
RBC # BLD AUTO: 3.5 M/UL (ref 4.1–5.7)
RBC MORPH BLD: ABNORMAL
SERVICE CMNT-IMP: NORMAL
SODIUM SERPL-SCNC: 138 MMOL/L (ref 136–145)
VENTRICULAR RATE, ECG03: 97 BPM
WBC # BLD AUTO: 6.6 K/UL (ref 4.1–11.1)

## 2022-02-10 PROCEDURE — 77030040392 HC DRSG OPTIFOAM MDII -A

## 2022-02-10 PROCEDURE — 84145 PROCALCITONIN (PCT): CPT

## 2022-02-10 PROCEDURE — 82962 GLUCOSE BLOOD TEST: CPT

## 2022-02-10 PROCEDURE — 87185 SC STD ENZYME DETCJ PER NZM: CPT

## 2022-02-10 PROCEDURE — 74011250636 HC RX REV CODE- 250/636: Performed by: HOSPITALIST

## 2022-02-10 PROCEDURE — 94761 N-INVAS EAR/PLS OXIMETRY MLT: CPT

## 2022-02-10 PROCEDURE — 77010033678 HC OXYGEN DAILY

## 2022-02-10 PROCEDURE — 74011250636 HC RX REV CODE- 250/636: Performed by: EMERGENCY MEDICINE

## 2022-02-10 PROCEDURE — 74011250636 HC RX REV CODE- 250/636: Performed by: NURSE PRACTITIONER

## 2022-02-10 PROCEDURE — 74011000258 HC RX REV CODE- 258: Performed by: EMERGENCY MEDICINE

## 2022-02-10 PROCEDURE — 80053 COMPREHEN METABOLIC PANEL: CPT

## 2022-02-10 PROCEDURE — 87205 SMEAR GRAM STAIN: CPT

## 2022-02-10 PROCEDURE — 85025 COMPLETE CBC W/AUTO DIFF WBC: CPT

## 2022-02-10 PROCEDURE — 83735 ASSAY OF MAGNESIUM: CPT

## 2022-02-10 PROCEDURE — 85610 PROTHROMBIN TIME: CPT

## 2022-02-10 PROCEDURE — 65270000029 HC RM PRIVATE

## 2022-02-10 PROCEDURE — 99222 1ST HOSP IP/OBS MODERATE 55: CPT | Performed by: SURGERY

## 2022-02-10 PROCEDURE — 36415 COLL VENOUS BLD VENIPUNCTURE: CPT

## 2022-02-10 PROCEDURE — 84100 ASSAY OF PHOSPHORUS: CPT

## 2022-02-10 PROCEDURE — 87077 CULTURE AEROBIC IDENTIFY: CPT

## 2022-02-10 PROCEDURE — 2709999900 HC NON-CHARGEABLE SUPPLY

## 2022-02-10 PROCEDURE — 87147 CULTURE TYPE IMMUNOLOGIC: CPT

## 2022-02-10 PROCEDURE — 74011250637 HC RX REV CODE- 250/637: Performed by: HOSPITALIST

## 2022-02-10 PROCEDURE — 74011250637 HC RX REV CODE- 250/637: Performed by: NURSE PRACTITIONER

## 2022-02-10 PROCEDURE — 87186 SC STD MICRODIL/AGAR DIL: CPT

## 2022-02-10 PROCEDURE — 87076 CULTURE ANAEROBE IDENT EACH: CPT

## 2022-02-10 PROCEDURE — 93005 ELECTROCARDIOGRAM TRACING: CPT

## 2022-02-10 PROCEDURE — 74011000250 HC RX REV CODE- 250: Performed by: EMERGENCY MEDICINE

## 2022-02-10 RX ORDER — HYDROMORPHONE HYDROCHLORIDE 1 MG/ML
0.5 INJECTION, SOLUTION INTRAMUSCULAR; INTRAVENOUS; SUBCUTANEOUS
Status: ACTIVE | OUTPATIENT
Start: 2022-02-10 | End: 2022-02-11

## 2022-02-10 RX ORDER — DEXTROSE MONOHYDRATE 100 MG/ML
125-250 INJECTION, SOLUTION INTRAVENOUS AS NEEDED
Status: DISCONTINUED | OUTPATIENT
Start: 2022-02-10 | End: 2022-02-18 | Stop reason: HOSPADM

## 2022-02-10 RX ORDER — OXYCODONE HYDROCHLORIDE 5 MG/1
5-10 TABLET ORAL
Status: DISCONTINUED | OUTPATIENT
Start: 2022-02-10 | End: 2022-02-14

## 2022-02-10 RX ADMIN — PANTOPRAZOLE SODIUM 40 MG: 40 TABLET, DELAYED RELEASE ORAL at 08:31

## 2022-02-10 RX ADMIN — VANCOMYCIN HYDROCHLORIDE 1000 MG: 1 INJECTION, POWDER, LYOPHILIZED, FOR SOLUTION INTRAVENOUS at 12:33

## 2022-02-10 RX ADMIN — VANCOMYCIN HYDROCHLORIDE 1000 MG: 1 INJECTION, POWDER, LYOPHILIZED, FOR SOLUTION INTRAVENOUS at 21:00

## 2022-02-10 RX ADMIN — OXYCODONE 5 MG: 5 TABLET ORAL at 11:30

## 2022-02-10 RX ADMIN — OXYCODONE 5 MG: 5 TABLET ORAL at 16:48

## 2022-02-10 RX ADMIN — VANCOMYCIN HYDROCHLORIDE 1000 MG: 1 INJECTION, POWDER, LYOPHILIZED, FOR SOLUTION INTRAVENOUS at 05:13

## 2022-02-10 RX ADMIN — LEVOFLOXACIN 750 MG: 5 INJECTION, SOLUTION INTRAVENOUS at 17:48

## 2022-02-10 RX ADMIN — CEFEPIME HYDROCHLORIDE 2 G: 2 INJECTION, POWDER, FOR SOLUTION INTRAVENOUS at 11:32

## 2022-02-10 RX ADMIN — MIRTAZAPINE 30 MG: 15 TABLET, FILM COATED ORAL at 21:53

## 2022-02-10 RX ADMIN — DOCUSATE SODIUM 50 MG AND SENNOSIDES 8.6 MG 2 TABLET: 8.6; 5 TABLET, FILM COATED ORAL at 21:53

## 2022-02-10 RX ADMIN — DOCUSATE SODIUM 50 MG AND SENNOSIDES 8.6 MG 2 TABLET: 8.6; 5 TABLET, FILM COATED ORAL at 03:30

## 2022-02-10 RX ADMIN — SERTRALINE 25 MG: 50 TABLET, FILM COATED ORAL at 08:30

## 2022-02-10 RX ADMIN — OXYCODONE 5 MG: 5 TABLET ORAL at 01:13

## 2022-02-10 RX ADMIN — SODIUM CHLORIDE 250 ML: 9 INJECTION, SOLUTION INTRAVENOUS at 04:08

## 2022-02-10 RX ADMIN — MIRTAZAPINE 30 MG: 15 TABLET, FILM COATED ORAL at 03:30

## 2022-02-10 RX ADMIN — DRONABINOL 2.5 MG: 2.5 CAPSULE ORAL at 16:30

## 2022-02-10 RX ADMIN — CEFEPIME HYDROCHLORIDE 2 G: 2 INJECTION, POWDER, FOR SOLUTION INTRAVENOUS at 04:10

## 2022-02-10 RX ADMIN — Medication 1 CAPSULE: at 08:31

## 2022-02-10 RX ADMIN — ACETAMINOPHEN 650 MG: 325 TABLET ORAL at 09:52

## 2022-02-10 RX ADMIN — ACETAMINOPHEN 650 MG: 325 TABLET ORAL at 01:13

## 2022-02-10 RX ADMIN — SODIUM CHLORIDE, PRESERVATIVE FREE 10 ML: 5 INJECTION INTRAVENOUS at 08:32

## 2022-02-10 RX ADMIN — CEFEPIME HYDROCHLORIDE 2 G: 2 INJECTION, POWDER, FOR SOLUTION INTRAVENOUS at 21:53

## 2022-02-10 RX ADMIN — SODIUM CHLORIDE 100 ML/HR: 9 INJECTION, SOLUTION INTRAVENOUS at 01:11

## 2022-02-10 RX ADMIN — OXYCODONE 10 MG: 5 TABLET ORAL at 22:14

## 2022-02-10 RX ADMIN — ASPIRIN 81 MG CHEWABLE TABLET 81 MG: 81 TABLET CHEWABLE at 08:31

## 2022-02-10 RX ADMIN — ENOXAPARIN SODIUM 40 MG: 100 INJECTION SUBCUTANEOUS at 08:29

## 2022-02-10 NOTE — PROGRESS NOTES
0700- Report given to Nelli Dawkins RN by off going nurse. 1530- - Pt transported to [phone] with belongings.

## 2022-02-10 NOTE — CONSULTS
Surgery Consult    Subjective:      Farshad Baltazar is a 80 y.o. male who I am consulted on by Dr Josh Samano for pressure ulcers and fever. He has had MS for 40 years and is now primarily bed ridden and in a chair. He has been dealing with pressure ulcers for many years. Recently he had debridement of bilateral ischial ulcers as well as sacral ulcers. He has osteomyelitis of the bones at the bases of the wounds. He has a chronic indwelling mauro and may have a UTI as well. The pain is described as mild and is 2/10 in intensity. He has been [facility] at the [facility]. Past Medical History:   Diagnosis Date    MS (multiple sclerosis) ([facility] [address]     Stroke [facility])      Past Surgical History:   Procedure Laterality Date    HX CORONARY STENT PLACEMENT      x7       No family history on file.   Social History     Socioeconomic History    Marital status: SINGLE   Tobacco Use    Smoking status: Never Smoker   Substance and Sexual Activity    Alcohol use: No      Current Facility-Administered Medications   Medication Dose Route Frequency Provider Last Rate Last Admin    vancomycin (VANCOCIN) 1,000 mg in 0.9% sodium chloride 250 mL (VIAL-MATE)  1,000 mg IntraVENous Q8H Solange Conway MD   IV Completed at 02/10/22 2182    Please draw a Vancomycin trough level prior to the 1300 dose   2-10-22  1 Each Other ONCE Anthony Nava MD        sodium chloride (NS) flush 5-10 mL  5-10 mL IntraVENous PRN Solange Conway MD   10 mL at 02/10/22 0832    cefepime (MAXIPIME) 2 g in 0.9% sodium chloride (MBP/ADV) 100 mL MBP  2 g IntraVENous Q8H Solange Conway MD   IV Completed at 02/10/22 0450    levoFLOXacin (LEVAQUIN) 750 mg in D5W IVPB  750 mg IntraVENous Q24H Solange Conway MD   IV Completed at 02/09/22 2148    acetaminophen (TYLENOL) tablet 650 mg  650 mg Oral Q6H PRN Anthony Nava MD   650 mg at 02/10/22 0952    ondansetron (ZOFRAN) injection 4 mg  4 mg IntraVENous Q6H PRN Anthony Nava MD        aspirin chewable tablet 81 mg  81 mg Oral DAILY Lou Sierra MD   81 mg at 02/10/22 0831    albuterol-ipratropium (DUO-NEB) 2.5 MG-0.5 MG/3 ML  3 mL Nebulization Q4H PRN Lou Sierra MD        guaiFENesin (ROBITUSSIN) 100 mg/5 mL oral liquid 200 mg  200 mg Oral Q6H PRN Lou Sierra MD        dronabinoL (MARINOL) capsule 2.5 mg  2.5 mg Oral ACB&D Lou Sierra MD        mirtazapine (REMERON) tablet 30 mg  30 mg Oral QHS Lou Sierra MD   30 mg at 02/10/22 0330    insulin lispro (HUMALOG) injection   SubCUTAneous AC&HS Lou Sierra MD        glucose chewable tablet 16 g  4 Tablet Oral PRN Lou Sierra MD        glucagon (GLUCAGEN) injection 1 mg  1 mg IntraMUSCular PRN Lou Sierra MD        dextrose 10% infusion 250 mL  250 mL IntraVENous PRN Lou Sierra MD        pantoprazole (PROTONIX) tablet 40 mg  40 mg Oral ACB Lou Sierra MD   40 mg at 02/10/22 0831    oxyCODONE IR (ROXICODONE) tablet 5 mg  5 mg Oral Q4H PRN Lou Sierra MD   5 mg at 02/10/22 0113    senna-docusate (PERICOLACE) 8.6-50 mg per tablet 2 Tablet  2 Tablet Oral QHS Lou Sierra MD   2 Tablet at 02/10/22 0330    sertraline (ZOLOFT) tablet 25 mg  25 mg Oral DAILY Lou Sierra MD   25 mg at 02/10/22 0830    [START ON 2/11/2022] glatiramer (COPAXONE) injection 20 mg  20 mg SubCUTAneous Q MON, WED & Delano George MD        0.9% sodium chloride infusion  100 mL/hr IntraVENous CONTINUOUS Lou Sierra  mL/hr at 02/10/22 0111 100 mL/hr at 02/10/22 0111    L.acidophilus-paracasei-S.thermophil-bifidobacter (RISAQUAD) 8 billion cell capsule  1 Capsule Oral DAILY Lou Sierra MD   1 Capsule at 02/10/22 0831    enoxaparin (LOVENOX) injection 40 mg  40 mg SubCUTAneous DAILY Lou Sierra MD   40 mg at 02/10/22 9846     Current Outpatient Medications   Medication Sig Dispense Refill    glatiramer (COPAXONE) 20 mg/mL injection 20 mg by SubCUTAneous route daily.  metoprolol tartrate (LOPRESSOR) 25 mg tablet Take 25 mg by mouth daily.  nitrofurantoin (MACRODANTIN) 25 mg capsule Take 25 mg by mouth daily. Indications: PREVENTION OF BACTERIAL URINARY TRACT INFECTION      finasteride (PROSCAR) 5 mg tablet Take 5 mg by mouth daily.  tamsulosin (FLOMAX) 0.4 mg capsule Take 0.4 mg by mouth daily.  traMADol (ULTRAM) 50 mg tablet Take 1 Tab by mouth every six (6) hours as needed for Pain. Max Daily Amount: 200 mg. 10 Tab 0    levofloxacin (LEVAQUIN) 500 mg tablet Take 1 Tab by mouth daily. 7 Tab 0    Aspirin, Buffered 81 mg tab Take 1 Tab by mouth daily.  ranitidine (ZANTAC) 300 mg tablet Take 300 mg by mouth daily.  nitroglycerin (NITROSTAT) 0.4 mg SL tablet by SubLINGual route every five (5) minutes as needed for Chest Pain.  acetaminophen (TYLENOL) 325 mg tablet Take  by mouth every four (4) hours as needed for Pain.  ibuprofen (MOTRIN) 600 mg tablet Take  by mouth every eight (8) hours as needed for Pain.  atorvastatin (LIPITOR) 40 mg tablet Take  by mouth daily.  pyridoxine (VITAMIN B-6) 50 mg tablet Take  by mouth daily.           Allergies   Allergen Reactions    Beeswax Anaphylaxis    Bee Pollen Unknown (comments)    Simvastatin Not Reported This Time    Tolterodine Not Reported This Time    Venom-Wasp Not Reported This Time       Review of Systems:  A comprehensive review of systems was negative except for: Constitutional: positive for fevers, chills, sweats, malaise and weight loss  Respiratory: positive for dyspnea on exertion  Gastrointestinal: positive for constipation  Neurological: positive for speech problems, coordination problems, gait problems, tremor and weakness    Objective:        Patient Vitals for the past 8 hrs:   BP Temp Pulse Resp SpO2   02/10/22 0830 (!) 120/54 97.5 °F (36.4 °C) 82 18 100 %   02/10/22 0706 (!) 121/51  89 21 100 %   02/10/22 0644 120/62  85 14 100 %   02/10/22 0630 (!) 102/57       02/10/22 0629   84 18 100 %   02/10/22 0615 (!) 99/54  85 (!) 31 100 %   02/10/22 0553 (!) 106/53  85 19 100 %   02/10/22 0538 (!) 99/53  83 17 100 %   02/10/22 0523 (!) 117/51  85 20 100 %   02/10/22 0520 105/60  87 20 100 %   02/10/22 0505 (!) 101/54  87 18 93 %   02/10/22 0441 (!) 97/58  85 16 97 %   02/10/22 0426 (!) 94/53  87 20 92 %   02/10/22 0411 (!) 97/54  87 21 94 %   02/10/22 0356 (!) 95/49  89 21 93 %   02/10/22 0341 (!) 93/50  91 22 96 %   02/10/22 0330 (!) 105/52  89 17 98 %   02/10/22 0315 (!) 110/49  90 20 95 %       Temp (24hrs), Av.5 °F (37.5 °C), Min:97.5 °F (36.4 °C), Max:101.6 °F (38.7 °C)      Physical Exam:  GENERAL: alert, cooperative, mild distress, appears older than stated age, EYE: negative, LYMPHATIC: Cervical, supraclavicular, and axillary nodes normal. , THROAT & NECK: normal and no erythema or exudates noted. , LUNG: clear to auscultation bilaterally, HEART: regular rate and rhythm, S1, S2 normal, no murmur, click, rub or gallop, ABDOMEN: soft, non-tender. Bowel sounds normal. No masses,  no organomegaly, lower midline surgical scar, EXTREMITIES:  Some contractures, SKIN: extensive bilateral pressure ulcers and sacral ulcer with bone exposure and some purulence and necrotic tissue, NEUROLOGIC: positive findings: abnormal muscle tone throughout, muscular weakness throughout extremities with atrophy and abnormality of coordination needs full assistance with movement    Assessment:     Hospital Problems  Never Reviewed          Codes Class Noted POA    [facility] ICD-10-CM: A41.9  ICD-9-CM: 038.9, 995.91  2022 Unknown        Sacral pressure ulcer ICD-10-CM: L89.159  ICD-9-CM: 707.03, 707.20  2022 Unknown          Bilateral ischial and sacral pressure ulcer stage 4 with osteomyelitis  This is a very challenging situation. He needs offloading of the pressure sites.   Furthermore, wound contamination with stool and poor nutritional status will make it nearly impossible to heal.    MS.  advanced    Plan:       Evaluated the wound with the Kevin Morales NP and the Jose Walker RN (wound care team)    Will use Dakins to assist with cleaning up the wound for several days and likely need some sharp debridement at some point    I also discussed a diverting colostomy to better control contamination and for better hygiene but he declined today    Taniya Palma MD FACS

## 2022-02-10 NOTE — PROGRESS NOTES
Received message from patient's nurse stating:    PT BP is 93/50 with a MAP of 60. last two Bp have been a MAP of 64         Discussion / orders:     ml iv bolus           Please note that this note was dictated using Dragon computer voice recognition software. Quite often unanticipated grammatical, syntax, homophones, and other interpretive errors are inadvertently transcribed by the computer software. Please disregard these errors. Please excuse any errors that have escaped final proofreading.      Signed by:  Martine Noonan DNP, ACNP-BC

## 2022-02-10 NOTE — ED NOTES
Received report from Ramiro Coker Guthrie [facility]. PT ANOX4, respirations even and unlabored, skin warm dry and intact, NAD noted. PT advised regarding staff change at this time, pt verbalized understanding. PT denies any additional needs or concerns at this time.

## 2022-02-10 NOTE — PROGRESS NOTES
Pharmacy Antimicrobial Kinetic Dosing    Indication for Antimicrobials: Possible Bacteremia + Chronic osteomyelitis involving the sacrococcygeal junction with overlying SSTI    Current Regimen of Each Antimicrobial:  Vancomycin  1750 mg IV x1 then pharmacy to dose (Start Date 2/10 ; Day # 1)  Cefepime 2 gm IV q 8h    (Start Date 2/10 ; Day # 1)  Levaquin 750 mg IV q 24h  (Start Date 2/10 ; Day # 1)    Previous Antimicrobial Therapy:      Goal Level: AUC: 400-600 mg/hr/Liter/day    Date Dose & Interval Measured (mcg/mL) Predicted AUC/WILLIE    1000 mg IV q 8h  553/   17.7                 Date & time of next level: check trough level before second dose (~ 1300 2-10-22)  (ordered)    Dosing calculator used: [employer] calc[employer]eranza[employer]com    Significant Positive Cultures:   22  Blood = pending  22   Urine = pending    Conditions for Dosing Consideration: MS, past Stroke, low creatinine    Labs:  Recent Labs     22  1920   CREA 0.39*   BUN 23*     Recent Labs     22  1920   WBC 11.8*     Temp (24hrs), Av.6 °F (38.7 °C), Min:101.6 °F (38.7 °C), Max:101.6 °F (38.7 °C)        Creatinine Clearance (mL/min):   CrCl (Ideal Body Weight): 111.0   If actual weight < IBW: CrCl (Actual Body Weight) 97.1    Impression/Plan:   · Continue Cefepime and Levaquin as above  · Continue with Vancomycin 1000 mg IV  q8h, check trough level before second dose (~ 1300 2-10-22)  · Daily BMP per Vancomycin dosing protocol   · Antimicrobial stop date: To be determined     Pharmacy will follow daily and adjust medications as appropriate for renal function and/or serum levels.     Thank you,  Renan Jo, [facility]

## 2022-02-10 NOTE — ED NOTES
PT BP  93/50 with a MAP of 60. Previous two BP MAP of 64. PT BP cuff position adjusted and position checked for proper placement. Perfect serv message sent to NP Purveyor to notify of pt vitals.

## 2022-02-10 NOTE — ED NOTES
Pt turned to his right side, pressure points padded. Pt had a large bowel movement while his brief and sheets  being changed. This is posttraumatic in origin.  She was encouraged to do deep inspiration.

## 2022-02-10 NOTE — ED NOTES
Pt sitting up in bed. He denies any needs at this time. He was offered the TV to watch and readjustment in bed. Pt declined.

## 2022-02-10 NOTE — H&P
Hospitalist Admission Note    NAME: Liang Stoddard   :  1950   MRN:  [id number]     Date/Time:  2022 9:17 PM    Patient PCP: Alee Oakley MD  ______________________________________________________________________  Given the patient's current clinical presentation, I have a high level of concern for decompensation if discharged from the emergency department. Complex decision making was performed, which includes reviewing the patient's available past medical records, laboratory results, and x-ray films. My assessment of this patient's clinical condition and my plan of care is as follows.     Assessment / Plan:  Sepsis (tachycardia and fever) due to infected sacral and left ischium pressure ulcers with history of chronic osteomyelitis  Admit to telemetry  + Leukocytosis, fever 102  Follow-up blood cultures, lactic acid, procalcitonin  IVF  Cover with broad-spectrum IV antibiotics  FU CT   General surgery consult  Wound care consult  Consider ID consult once cultures are back     Possible UTI in settings of indwelling mauro  Current antibiotics will cover  Change Mauro per hospital protocol  Follow-up urine culture    MS with severe debility  bed bound   Continue Copaxone injections    Dysphagia  On regular diet at the nursing home per family request  Multiple MBS done in the past but showing some level of dysphagia  Family and patient is not interested in in speech eval or another MBS  Patient and family is aware of risk of aspiration and accepting it fully  HOB at 45 degree of the time  Aspiration precautions  Supervised feeding    Chronic cough, frequent coughing spells  Neurogenic bladder with indwelling Mauro  CAD: Continue aspirin  Moderate protein calorie malnutrition: Dietary consult  Adult failure to thrive: Continue Remeron and Marinol  Functional quadriplegia  Depression: Continue Zoloft and Remeron  Hx of PE: Anticoagulation was discontinued due to recurrent hematuria Pharmacy consulted for med check ( med list was brought from the facility with pt, reviewed )       Code Status: full code d/w pt and niece at bedside   Surrogate Decision Maker: AUDREY Lux ( ortho NP [facility]) [id number]  and brother Carlos Flores [phone]    DVT Prophylaxis: lovenox   GI Prophylaxis: not indicated    Baseline: Nursing home resident, bedbound, no children       Subjective:   CHIEF COMPLAINT: fever     HISTORY OF PRESENT ILLNESS:     Roby Arellano is a 80 y.o.  male who presents with above complaints. Patient is getting all his care at [facility]. [facility] is on diversion, so he was brought to [facility]. Patient with history of MS and severe debility, he is bedbound. He has history of multiple pressure ulcers. History of chronic osteomyelitis. He started with fever 102 and tachycardia since this morning. He was sent to the emergency room for further evaluation. He admits to pain in his back 4/10 now. He reports that it is his chronic level of pain and he is comfortable with it. We were asked to admit for work up and evaluation of the above problems.      Past Medical History:   Diagnosis Date    MS (multiple sclerosis) ([facility]     Stroke ([facility]    Adjustment disorder with depressed mood  Anemia  BPH  Dysphagia  GERD  Hyperlipidemia  Pressure ulcer of right ankle stage III  Adult failure to thrive  CAD  Functional quadriplegia  Moderate protein calorie malnutrition  Chronic pain  Pulmonary embolism  Pressure ulcer of the left lower buttock stage IV  Pressure ulcer of sacral region stage IV    Past Surgical History:   Procedure Laterality Date    HX CORONARY STENT PLACEMENT      x7        Social History     Tobacco Use    Smoking status: Never Smoker    Smokeless tobacco: Not on file   Substance Use Topics    Alcohol use: No        family history: htn     Allergies   Allergen Reactions    Beeswax Anaphylaxis    Bee Pollen Unknown (comments)    Simvastatin Not Reported This Time    Tolterodine Not Reported This Time   Hola Landis Not Reported This Time        Prior to Admission medications    Medication Sig Start Date End Date Taking? Authorizing Provider   glatiramer (COPAXONE) 20 mg/mL injection 20 mg by SubCUTAneous route daily. Provider, Historical   metoprolol tartrate (LOPRESSOR) 25 mg tablet Take 25 mg by mouth daily. Provider, Historical   nitrofurantoin (MACRODANTIN) 25 mg capsule Take 25 mg by mouth daily. Indications: PREVENTION OF BACTERIAL URINARY TRACT INFECTION    Provider, Historical   finasteride (PROSCAR) 5 mg tablet Take 5 mg by mouth daily. Provider, Historical   tamsulosin (FLOMAX) 0.4 mg capsule Take 0.4 mg by mouth daily. Provider, Historical   traMADol (ULTRAM) 50 mg tablet Take 1 Tab by mouth every six (6) hours as needed for Pain. Max Daily Amount: 200 mg. 7/20/16   Suzanne Morillo MD   levofloxacin (LEVAQUIN) 500 mg tablet Take 1 Tab by mouth daily. 7/20/16   Suzanne Morillo MD   Aspirin, Buffered 81 mg tab Take 1 Tab by mouth daily. Provider, Historical   ranitidine (ZANTAC) 300 mg tablet Take 300 mg by mouth daily. Provider, Historical   nitroglycerin (NITROSTAT) 0.4 mg SL tablet by SubLINGual route every five (5) minutes as needed for Chest Pain. Provider, Historical   acetaminophen (TYLENOL) 325 mg tablet Take  by mouth every four (4) hours as needed for Pain. Provider, Historical   ibuprofen (MOTRIN) 600 mg tablet Take  by mouth every eight (8) hours as needed for Pain. Provider, Historical   atorvastatin (LIPITOR) 40 mg tablet Take  by mouth daily. Provider, Historical   pyridoxine (VITAMIN B-6) 50 mg tablet Take  by mouth daily. Provider, Historical       REVIEW OF SYSTEMS:     I am not able to complete the review of systems because:    The patient is intubated and sedated    The patient has altered mental status due to his acute medical problems    The patient has baseline aphasia from prior stroke(s)    The patient has baseline dementia and is not reliable historian    The patient is in acute medical distress and unable to provide information           Total of 12 systems reviewed as follows:       POSITIVE= underlined text  Negative = text not underlined  General:  fever, chills, sweats, generalized weakness, weight loss/gain,      loss of appetite   Eyes:    blurred vision, eye pain, loss of vision, double vision  ENT:    rhinorrhea, pharyngitis   Respiratory:   cough, sputum production, SOB, MATUTE, wheezing, pleuritic pain   Cardiology:   chest pain, palpitations, orthopnea, PND, edema, syncope   Gastrointestinal:  abdominal pain , N/V, diarrhea, dysphagia, constipation, bleeding   Genitourinary:  frequency, urgency, dysuria, hematuria, incontinence   Muskuloskeletal :  arthralgia, myalgia, back pain  Hematology:  easy bruising, nose or gum bleeding, lymphadenopathy   Dermatological: rash, ulceration, pruritis, color change / jaundice  Endocrine:   hot flashes or polydipsia   Neurological:  headache, dizziness, confusion, focal weakness, paresthesia,     Speech difficulties, memory loss, gait difficulty  Psychological: Feelings of anxiety, depression, agitation    Objective:   VITALS:    Visit Vitals  BP (P) 100/69   Pulse (!) (P) 126   Temp (!) (P) 101.6 °F (38.7 °C)   Resp (P) 20   SpO2 (P) 98%       PHYSICAL EXAM:    General:    Alert, cooperative, no distress, appears stated age. Significantly debilitated, frail, temporal wasting  HEENT: Atraumatic, anicteric sclerae, pink conjunctivae     No oral ulcers, mucosa moist, throat clear, dentition fair  Neck:  Supple, symmetrical,  thyroid: non tender  Lungs:   Clear to auscultation bilaterally. No Wheezing or Rhonchi. No rales. Chest wall:  No tenderness  No Accessory muscle use. Heart:   Regular  rhythm,  No  murmur   No edema  Abdomen:   Soft, non-tender. Not distended. Bowel sounds normal  Extremities: No cyanosis.   No clubbing,      Skin turgor normal, Capillary refill normal, Radial dial pulse 2+                          Left upper extremity contracture  Skin:     Not pale. Not Jaundiced  No rashes   Psych:  Good insight. Not depressed. Not anxious or agitated. Neurologic: EOMs intact. No facial asymmetry. No aphasia or slurred speech. LEs weakness chronic. Alert and oriented X 4.     _______________________________________________________________________  Care Plan discussed with:    Comments   Patient y    Family  y Niece at bedside    RN y    Care Manager                    Consultant:  valerie ED provider    _______________________________________________________________________  Expected  Disposition:   Home with Family    HH/PT/OT/RN    SNF/LTC y   [de-identified]    ________________________________________________________________________  TOTAL TIME:  72 Minutes    Critical Care Provided     Minutes non procedure based      Comments    y Reviewed previous records   >50% of visit spent in counseling and coordination of care  Discussion with patient and/or family and questions answered       ________________________________________________________________________  Signed: Oliva West MD    Procedures: see electronic medical records for all procedures/Xrays and details which were not copied into this note but were reviewed prior to creation of Plan.     LAB DATA REVIEWED:    Recent Results (from the past 24 hour(s))   URINALYSIS W/ REFLEX CULTURE    Collection Time: 02/09/22  7:20 PM    Specimen: Urine   Result Value Ref Range    Color DARK YELLOW      Appearance TURBID (A) CLEAR      Specific gravity 1.030 1.003 - 1.030      pH (UA) 8.0 5.0 - 8.0      Protein 100 (A) NEG mg/dL    Glucose Negative NEG mg/dL    Ketone 15 (A) NEG mg/dL    Bilirubin Negative NEG      Blood MODERATE (A) NEG      Urobilinogen 1.0 0.2 - 1.0 EU/dL    Nitrites Negative NEG      Leukocyte Esterase LARGE (A) NEG      WBC 10-20 0 - 4 /hpf    RBC 0-5 0 - 5 /hpf    Epithelial cells FEW FEW /lpf    Bacteria 1+ (A) NEG /hpf    UA:UC IF INDICATED URINE CULTURE ORDERED (A) CNI     METABOLIC PANEL, COMPREHENSIVE    Collection Time: 02/09/22  7:20 PM   Result Value Ref Range    Sodium 136 136 - 145 mmol/L    Potassium 4.2 3.5 - 5.1 mmol/L    Chloride 100 97 - 108 mmol/L    CO2 31 21 - 32 mmol/L    Anion gap 5 5 - 15 mmol/L    Glucose 110 (H) 65 - 100 mg/dL    BUN 23 (H) 6 - 20 MG/DL    Creatinine 0.39 (L) 0.70 - 1.30 MG/DL    BUN/Creatinine ratio 59 (H) 12 - 20      GFR est AA >60 >60 ml/min/1.73m2    GFR est non-AA >60 >60 ml/min/1.73m2    Calcium 8.3 (L) 8.5 - 10.1 MG/DL    Bilirubin, total 0.4 0.2 - 1.0 MG/DL    ALT (SGPT) 15 12 - 78 U/L    AST (SGOT) 24 15 - 37 U/L    Alk. phosphatase 90 45 - 117 U/L    Protein, total 6.9 6.4 - 8.2 g/dL    Albumin 2.3 (L) 3.5 - 5.0 g/dL    Globulin 4.6 (H) 2.0 - 4.0 g/dL    A-G Ratio 0.5 (L) 1.1 - 2.2     CBC WITH AUTOMATED DIFF    Collection Time: 02/09/22  7:20 PM   Result Value Ref Range    WBC 11.8 (H) 4.1 - 11.1 K/uL    RBC 4.31 4.10 - 5.70 M/uL    HGB 10.2 (L) 12.1 - 17.0 g/dL    HCT 33.1 (L) 36.6 - 50.3 %    MCV 76.8 (L) 80.0 - 99.0 FL    MCH 23.7 (L) 26.0 - 34.0 PG    MCHC 30.8 30.0 - 36.5 g/dL    RDW 16.9 (H) 11.5 - 14.5 %    PLATELET 089 497 - 924 K/uL    MPV 9.8 8.9 - 12.9 FL    NRBC 0.0 0  WBC    ABSOLUTE NRBC 0.00 0.00 - 0.01 K/uL    NEUTROPHILS 91 (H) 32 - 75 %    LYMPHOCYTES 3 (L) 12 - 49 %    MONOCYTES 5 5 - 13 %    EOSINOPHILS 0 0 - 7 %    BASOPHILS 0 0 - 1 %    IMMATURE GRANULOCYTES 1 (H) 0.0 - 0.5 %    ABS. NEUTROPHILS 10.7 (H) 1.8 - 8.0 K/UL    ABS. LYMPHOCYTES 0.4 (L) 0.8 - 3.5 K/UL    ABS. MONOCYTES 0.6 0.0 - 1.0 K/UL    ABS. EOSINOPHILS 0.0 0.0 - 0.4 K/UL    ABS. BASOPHILS 0.0 0.0 - 0.1 K/UL    ABS. IMM.  GRANS. 0.1 (H) 0.00 - 0.04 K/UL    DF SMEAR SCANNED      RBC COMMENTS ANISOCYTOSIS  1+        RBC COMMENTS HYPOCHROMIA  1+       TROPONIN-HIGH SENSITIVITY    Collection Time: 02/09/22  7:20 PM   Result Value Ref Range    Troponin-High Sensitivity 14 0 - 76 ng/L   COVID-19 RAPID TEST    Collection Time: 02/09/22  7:20 PM   Result Value Ref Range    Specimen source Nasopharyngeal      COVID-19 rapid test Not detected NOTD

## 2022-02-10 NOTE — ED NOTES
Verbal shift change report given to Stephanie Cortés (oncoming nurse) by Jarvis William (offgoing nurse). Report included the following information SBAR, ED Summary, Intake/Output and MAR.

## 2022-02-10 NOTE — PROGRESS NOTES
End of Shift Note    Bedside shift change report given to [facility], LPN (oncoming nurse) by Simone Mahoney (offgoing nurse). Report included the following information SBAR, Kardex, ED Summary, MAR, Accordion and Recent Results    Shift worked:  DAy     Shift summary and any significant changes:     Patient arrived from ED. Specialty bed ordered this afternoon. Pain medication given. Wound care completed before arrival on the floor. Concerns for physician to address:  Specialty bed     Zone phone for oncoming shift:   [phone]       Activity:  Activity Level: Bed Rest  Number times ambulated in hallways past shift: 0  Number of times OOB to chair past shift: 0    Cardiac:   Cardiac Monitoring: No      Cardiac Rhythm: Sinus Rhythm    Access:   Current line(s): PIV     Genitourinary:   Urinary status: mauro    Respiratory:   O2 Device: Nasal cannula  Chronic home O2 use?: NO  Incentive spirometer at bedside: NO     GI:  Last Bowel Movement Date: 02/10/22  Current diet:  ADULT DIET Regular  ADULT ORAL NUTRITION SUPPLEMENT Breakfast, Lunch, Dinner; Diabetic Supplement  Passing flatus: YES  Tolerating current diet: YES       Pain Management:   Patient states pain is manageable on current regimen: YES    Skin:  Lewis Score: 9  Interventions: float heels, increase time out of bed and internal/external urinary devices    Patient Safety:  Fall Score:  Total Score: 3  Interventions: gripper socks, pt to call before getting OOB and stay with me (per policy)  High Fall Risk: Yes    Length of Stay:  Expected LOS: - - -  Actual LOS: [facility]

## 2022-02-10 NOTE — PROGRESS NOTES
[facility] or [place] from [facility] can be reached at [phone]. They said Mr. Clara Yeboah bed is on hold for ten days I also gave them the  number.

## 2022-02-10 NOTE — WOUND CARE
Wound care consult for patient with \"multiple pressure injuries\" present on admission. Pt. Is 80years old and is being admitted for Sepsis. Pt. Has Multiple Sclerosis since he was in his 30's and has a chronic indwelling mauro catheter. He also has dysphagia but receives regular diet with supervised feeding and aspiration precautions at the [facility]. Other issues from the chart:   Chronic cough, frequent coughing spells  Neurogenic bladder with indwelling Mauro  CAD: Continue aspirin  Moderate protein calorie malnutrition: Dietary consult  Adult failure to thrive: Continue Remeron and Marinol  Functional quadriplegia  Depression: Continue Zoloft and Remeron  Hx of PE: Anticoagulation was discontinued due to recurrent hematuria    Assessment of the wounds:  Pt. Has bilateral ischial and Sacral pressure injuries and a right lateral ankle pressure injury that are all stage 4 with varying degrees of healing properties and all with palpable bone. All of the wounds are painful for him with every wound care evaluation and cleaning / dressing. Overall two of the wounds - left ischial and sacrum have odor but none of the wounds appear to be overly infected unless the infection is deeper / osteomyelitis. Wounds with osteo can heal but if untreated, wounds re-open often.    See photos and details below:   WOUND POA CONDITIONS    Wound Sacrum (Active) - Stage 4 pressure injury   Wound Image   02/10/22 1133   Wound Etiology Pressure Stage 4 02/10/22 1133   Dressing Status New dressing applied 02/10/22 1133   Cleansed Cleansed with saline 02/10/22 1133   Dressing/Treatment Foam;Hydrofera Blue;Moisten with saline 02/10/22 1133   Offloading for Diabetic Foot Ulcers Offloading ordered 02/10/22 1133   Dressing Change Due 02/11/22 02/10/22 1133   Wound Length (cm) 3.2 cm 02/10/22 1133   Wound Width (cm) 4.5 cm 02/10/22 1133   Wound Depth (cm) 2.5 cm 02/10/22 1133   Wound Surface Area (cm^2) 14.4 cm^2 02/10/22 1133   Wound Volume (cm^3) 36 cm^3 02/10/22 1133   Undermining Starts ___ O'Clock 12 o'clock 02/10/22 1133   Undermining Ends ___ O'Clock 3 o'clock 02/10/22 1133   Undermining Maximum Distance (cm) 4 cm 02/10/22 1133   Wound Assessment Granulation tissue;Bleeding;Eschar moist;Exposed Structure Muscle; Exposed Structure Tendon 02/10/22 1133   Drainage Amount Moderate 02/10/22 1133   Drainage Description Serosanguinous; Yellow 02/10/22 1133   Wound Odor Mild 02/10/22 1133   Sparkle-Wound/Incision Assessment Blanchable erythema;Fragile; Maceration 02/10/22 1133   Edges Epibole (rolled edges) 02/10/22 1133   Wound Thickness Description Full thickness 02/10/22 1133       Wound Ischial Left Left ischial Stage 4 pressure injury (Active)   Wound Image   02/10/22 1133   Wound Etiology Pressure Stage 4 02/10/22 1133   Dressing Status New dressing applied 02/10/22 1133   Cleansed Cleansed with saline 02/10/22 1133   Dressing/Treatment Hydrofera Blue;Moisten with saline;Packing; Foam 02/10/22 1133   Dressing Change Due 02/11/22 02/10/22 1133   Wound Length (cm) 7 cm 02/10/22 1133   Wound Width (cm) 5.5 cm 02/10/22 1133   Wound Depth (cm) 4 cm 02/10/22 1133   Wound Surface Area (cm^2) 38.5 cm^2 02/10/22 1133   Wound Volume (cm^3) 154 cm^3 02/10/22 1133   Undermining Starts ___ O'Clock 12 o'clock 02/10/22 1133   Undermining Ends ___ O'Clock 2 o'clock 02/10/22 1133   Undermining Maximum Distance (cm) 2 cm 02/10/22 1133   Wound Assessment Bleeding;Eschar moist;Exposed Structure Bone;Exposed Structure Muscle; Exposed Structure Tendon;Pale granulation tissue;Pink/red;Slough 02/10/22 1133   Drainage Amount Moderate 02/10/22 1133   Drainage Description Thick; Serosanguinous;Gray 02/10/22 1133   Wound Odor Mild 02/10/22 1133   Sparkle-Wound/Incision Assessment Blanchable erythema; Maceration 02/10/22 1133   Edges Epibole (rolled edges) 02/10/22 1133   Wound Thickness Description Full thickness 02/10/22 1133       Wound Ischial Right Right ischial pressure injury stage 4 (Active)   Wound Image   02/10/22 1133   Wound Etiology Pressure Stage 4 02/10/22 1133   Dressing Status New dressing applied 02/10/22 1133   Cleansed Cleansed with saline 02/10/22 1133   Dressing/Treatment Hydrofera Blue;Moisten with saline;Packing; Foam 02/10/22 1133   Dressing Change Due 02/11/22 02/10/22 1133   Wound Length (cm) 6 cm 02/10/22 1133   Wound Width (cm) 5 cm 02/10/22 1133   Wound Depth (cm) 2 cm 02/10/22 1133   Wound Surface Area (cm^2) 30 cm^2 02/10/22 1133   Wound Volume (cm^3) 60 cm^3 02/10/22 1133   Undermining Starts ___ O'Clock 12 o'clock 02/10/22 1133   Undermining Ends ___ O'Clock 1 o'clock 02/10/22 1133   Undermining Maximum Distance (cm) 1 cm 02/10/22 1133   Wound Assessment Exposed Structure Bone;Exposed Structure Muscle;Pale granulation tissue;Pink/red;Eschar moist 02/10/22 1133   Drainage Amount Small 02/10/22 1133   Drainage Description Serosanguinous; Yellow 02/10/22 1133   Wound Odor None 02/10/22 1133   Sparkle-Wound/Incision Assessment Blanchable erythema;Denuded 02/10/22 1133   Edges Epibole (rolled edges) 02/10/22 1133   Wound Thickness Description Full thickness 02/10/22 1133       Wound Ankle Right;Lateral Right lateral stage 4 pressure injury (Active)   Wound Image   02/10/22 1150   Wound Etiology Pressure Stage 4 02/10/22 1150   Dressing Status Clean;New dressing applied 02/10/22 1150   Cleansed Cleansed with saline 02/10/22 1150   Dressing/Treatment Packing;Hydrofera Blue;Gauze dressing/dressing sponge 02/10/22 1150   Wound Assessment Bleeding;Granulation tissue; Exposed Structure Fascia 02/10/22 1150   Drainage Amount Small 02/10/22 1150   Drainage Description Serosanguinous 02/10/22 1150   Wound Odor None 02/10/22 1150   Sparkle-Wound/Incision Assessment Blanchable erythema 02/10/22 1150   Wound Thickness Description Full thickness 02/10/22 1150       Treatment: All wounds were cleansed with NS and wiped with gauze to remove the old drainage and wound debris.  Packed with NS moist Hydrofera Blue dressing and covered with foam dressings. The ankle wound was dressing with the Hydrofera Blue and a gauze dressing w/ tape. Plan: Orders written for wound care for the Weekend - Using Dakin Solution (HydroChlor) to cleanse each wound on the buttocks and sacrum and pack with Dakin solution moist Kerlix roll gauze. Cover with foam dressings OR high drainage pack dressings. The ankle is clean and healing and can be cleansed with NS and packed with the plain packing strip and cover with a foam dressing. OFF load the heels, (which are currently wound free) with his own Heel suspension boots - turn them inside out, place the heels in the pockets and wrap the foot with the boot and strap snugly. Nursing to order an Envella Bed from [employer] bed) as soon as possible. Turn patient every 3 hours once he on the new rental bed.    Priscila Clark RN, BSN, [address] [facility]

## 2022-02-11 ENCOUNTER — APPOINTMENT (OUTPATIENT)
Dept: NON INVASIVE DIAGNOSTICS | Age: 72
DRG: 871 | End: 2022-02-11
Attending: INTERNAL MEDICINE
Payer: MEDICARE

## 2022-02-11 LAB
CRP SERPL-MCNC: 11.2 MG/DL (ref 0–0.6)
ERYTHROCYTE [SEDIMENTATION RATE] IN BLOOD: 73 MM/HR (ref 0–20)
EST. AVERAGE GLUCOSE BLD GHB EST-MCNC: 143 MG/DL
GLUCOSE BLD STRIP.AUTO-MCNC: 139 MG/DL (ref 65–117)
GLUCOSE BLD STRIP.AUTO-MCNC: 91 MG/DL (ref 65–117)
GLUCOSE BLD STRIP.AUTO-MCNC: 96 MG/DL (ref 65–117)
GLUCOSE BLD STRIP.AUTO-MCNC: 99 MG/DL (ref 65–117)
HBA1C MFR BLD: 6.6 % (ref 4–5.6)
LACTATE BLD-SCNC: 0.99 MMOL/L (ref 0.4–2)
SERVICE CMNT-IMP: ABNORMAL
SERVICE CMNT-IMP: NORMAL
VANCOMYCIN SERPL-MCNC: 12.7 UG/ML

## 2022-02-11 PROCEDURE — 87040 BLOOD CULTURE FOR BACTERIA: CPT

## 2022-02-11 PROCEDURE — 36415 COLL VENOUS BLD VENIPUNCTURE: CPT

## 2022-02-11 PROCEDURE — 74011636637 HC RX REV CODE- 636/637: Performed by: HOSPITALIST

## 2022-02-11 PROCEDURE — 74011000250 HC RX REV CODE- 250: Performed by: INTERNAL MEDICINE

## 2022-02-11 PROCEDURE — 99232 SBSQ HOSP IP/OBS MODERATE 35: CPT | Performed by: SURGERY

## 2022-02-11 PROCEDURE — 74011000258 HC RX REV CODE- 258: Performed by: EMERGENCY MEDICINE

## 2022-02-11 PROCEDURE — 74011250637 HC RX REV CODE- 250/637: Performed by: INTERNAL MEDICINE

## 2022-02-11 PROCEDURE — 74011250636 HC RX REV CODE- 250/636: Performed by: EMERGENCY MEDICINE

## 2022-02-11 PROCEDURE — 74011250637 HC RX REV CODE- 250/637: Performed by: NURSE PRACTITIONER

## 2022-02-11 PROCEDURE — 86140 C-REACTIVE PROTEIN: CPT

## 2022-02-11 PROCEDURE — 74011000250 HC RX REV CODE- 250: Performed by: NURSE PRACTITIONER

## 2022-02-11 PROCEDURE — 82962 GLUCOSE BLOOD TEST: CPT

## 2022-02-11 PROCEDURE — 80202 ASSAY OF VANCOMYCIN: CPT

## 2022-02-11 PROCEDURE — 83036 HEMOGLOBIN GLYCOSYLATED A1C: CPT

## 2022-02-11 PROCEDURE — 85652 RBC SED RATE AUTOMATED: CPT

## 2022-02-11 PROCEDURE — 93306 TTE W/DOPPLER COMPLETE: CPT

## 2022-02-11 PROCEDURE — 74011250637 HC RX REV CODE- 250/637: Performed by: HOSPITALIST

## 2022-02-11 PROCEDURE — 99223 1ST HOSP IP/OBS HIGH 75: CPT | Performed by: INTERNAL MEDICINE

## 2022-02-11 PROCEDURE — 74011250636 HC RX REV CODE- 250/636: Performed by: HOSPITALIST

## 2022-02-11 PROCEDURE — 65270000029 HC RM PRIVATE

## 2022-02-11 PROCEDURE — 74011250636 HC RX REV CODE- 250/636: Performed by: INTERNAL MEDICINE

## 2022-02-11 RX ORDER — METOPROLOL TARTRATE 5 MG/5ML
5 INJECTION INTRAVENOUS
Status: COMPLETED | OUTPATIENT
Start: 2022-02-11 | End: 2022-02-11

## 2022-02-11 RX ORDER — UREA 10 %
200 LOTION (ML) TOPICAL DAILY
COMMUNITY

## 2022-02-11 RX ORDER — SENNOSIDES 8.6 MG/1
2 TABLET ORAL
COMMUNITY

## 2022-02-11 RX ORDER — IPRATROPIUM BROMIDE AND ALBUTEROL SULFATE 2.5; .5 MG/3ML; MG/3ML
3 SOLUTION RESPIRATORY (INHALATION)
COMMUNITY

## 2022-02-11 RX ORDER — OXYCODONE HYDROCHLORIDE 5 MG/1
5 TABLET ORAL
Status: ON HOLD | COMMUNITY
End: 2022-02-18 | Stop reason: SDUPTHER

## 2022-02-11 RX ORDER — LANOLIN ALCOHOL/MO/W.PET/CERES
325 CREAM (GRAM) TOPICAL EVERY OTHER DAY
COMMUNITY

## 2022-02-11 RX ORDER — MIRTAZAPINE 30 MG/1
30 TABLET, FILM COATED ORAL
COMMUNITY

## 2022-02-11 RX ORDER — DOCUSATE SODIUM 100 MG/1
100 CAPSULE, LIQUID FILLED ORAL
COMMUNITY

## 2022-02-11 RX ORDER — GUAIFENESIN 200 MG/1
400 TABLET ORAL
COMMUNITY

## 2022-02-11 RX ORDER — ASPIRIN 81 MG/1
81 TABLET ORAL DAILY
COMMUNITY

## 2022-02-11 RX ORDER — ACETAMINOPHEN 500 MG
1000 TABLET ORAL
COMMUNITY

## 2022-02-11 RX ORDER — BALSAM PERU/CASTOR OIL
OINTMENT (GRAM) TOPICAL 2 TIMES DAILY
Status: DISCONTINUED | OUTPATIENT
Start: 2022-02-11 | End: 2022-02-18 | Stop reason: HOSPADM

## 2022-02-11 RX ORDER — OMEPRAZOLE 40 MG/1
40 CAPSULE, DELAYED RELEASE ORAL DAILY
COMMUNITY

## 2022-02-11 RX ORDER — METOPROLOL TARTRATE 25 MG/1
12.5 TABLET, FILM COATED ORAL EVERY 12 HOURS
Status: DISCONTINUED | OUTPATIENT
Start: 2022-02-11 | End: 2022-02-18 | Stop reason: HOSPADM

## 2022-02-11 RX ORDER — SERTRALINE HYDROCHLORIDE 25 MG/1
25 TABLET, FILM COATED ORAL DAILY
COMMUNITY

## 2022-02-11 RX ORDER — FACIAL-BODY WIPES
10 EACH TOPICAL
COMMUNITY

## 2022-02-11 RX ORDER — ASCORBIC ACID 500 MG
500 TABLET ORAL DAILY
COMMUNITY

## 2022-02-11 RX ORDER — POLYETHYLENE GLYCOL 3350 17 G/17G
17 POWDER, FOR SOLUTION ORAL DAILY
COMMUNITY
End: 2022-02-18

## 2022-02-11 RX ORDER — FOLIC ACID 1 MG/1
2 TABLET ORAL DAILY
COMMUNITY

## 2022-02-11 RX ORDER — DRONABINOL 2.5 MG/1
2.5 CAPSULE ORAL 2 TIMES DAILY
COMMUNITY

## 2022-02-11 RX ORDER — DIGOXIN 125 MCG
0.25 TABLET ORAL ONCE
Status: COMPLETED | OUTPATIENT
Start: 2022-02-11 | End: 2022-02-11

## 2022-02-11 RX ORDER — DIGOXIN 125 MCG
0.12 TABLET ORAL ONCE
Status: COMPLETED | OUTPATIENT
Start: 2022-02-11 | End: 2022-02-12

## 2022-02-11 RX ORDER — DIGOXIN 125 MCG
0.12 TABLET ORAL DAILY
Status: DISCONTINUED | OUTPATIENT
Start: 2022-02-12 | End: 2022-02-17

## 2022-02-11 RX ORDER — LANOLIN ALCOHOL/MO/W.PET/CERES
325 CREAM (GRAM) TOPICAL EVERY OTHER DAY
Status: DISCONTINUED | OUTPATIENT
Start: 2022-02-11 | End: 2022-02-18 | Stop reason: HOSPADM

## 2022-02-11 RX ORDER — ZINC SULFATE 50(220)MG
1 CAPSULE ORAL EVERY OTHER DAY
COMMUNITY

## 2022-02-11 RX ORDER — MIDODRINE HYDROCHLORIDE 5 MG/1
5 TABLET ORAL ONCE
Status: COMPLETED | OUTPATIENT
Start: 2022-02-11 | End: 2022-02-11

## 2022-02-11 RX ORDER — METRONIDAZOLE 500 MG/100ML
500 INJECTION, SOLUTION INTRAVENOUS EVERY 12 HOURS
Status: DISCONTINUED | OUTPATIENT
Start: 2022-02-11 | End: 2022-02-14

## 2022-02-11 RX ORDER — GLATIRAMER 40 MG/ML
40 INJECTION, SOLUTION SUBCUTANEOUS
COMMUNITY

## 2022-02-11 RX ORDER — THERA TABS 400 MCG
1 TAB ORAL DAILY
COMMUNITY
End: 2022-02-18

## 2022-02-11 RX ORDER — INSULIN ASPART 100 [IU]/ML
INJECTION, SOLUTION INTRAVENOUS; SUBCUTANEOUS
COMMUNITY

## 2022-02-11 RX ORDER — ACETAMINOPHEN 500 MG
2000 TABLET ORAL DAILY
COMMUNITY

## 2022-02-11 RX ADMIN — VANCOMYCIN HYDROCHLORIDE 1000 MG: 1 INJECTION, POWDER, LYOPHILIZED, FOR SOLUTION INTRAVENOUS at 22:50

## 2022-02-11 RX ADMIN — METRONIDAZOLE 500 MG: 500 INJECTION, SOLUTION INTRAVENOUS at 09:03

## 2022-02-11 RX ADMIN — MIRTAZAPINE 30 MG: 15 TABLET, FILM COATED ORAL at 21:48

## 2022-02-11 RX ADMIN — ACETAMINOPHEN 650 MG: 325 TABLET ORAL at 01:17

## 2022-02-11 RX ADMIN — DIGOXIN 0.25 MG: 125 TABLET ORAL at 19:11

## 2022-02-11 RX ADMIN — PANTOPRAZOLE SODIUM 40 MG: 40 TABLET, DELAYED RELEASE ORAL at 09:03

## 2022-02-11 RX ADMIN — CEFEPIME HYDROCHLORIDE 2 G: 2 INJECTION, POWDER, FOR SOLUTION INTRAVENOUS at 21:48

## 2022-02-11 RX ADMIN — SODIUM CHLORIDE 500 ML: 9 INJECTION, SOLUTION INTRAVENOUS at 12:46

## 2022-02-11 RX ADMIN — CEFEPIME HYDROCHLORIDE 2 G: 2 INJECTION, POWDER, FOR SOLUTION INTRAVENOUS at 05:17

## 2022-02-11 RX ADMIN — CEFEPIME HYDROCHLORIDE 2 G: 2 INJECTION, POWDER, FOR SOLUTION INTRAVENOUS at 13:19

## 2022-02-11 RX ADMIN — OXYCODONE 10 MG: 5 TABLET ORAL at 10:15

## 2022-02-11 RX ADMIN — OXYCODONE 10 MG: 5 TABLET ORAL at 14:08

## 2022-02-11 RX ADMIN — GLATIRAMER ACETATE 20 MG: 20 INJECTION, SOLUTION SUBCUTANEOUS at 21:50

## 2022-02-11 RX ADMIN — ASPIRIN 81 MG CHEWABLE TABLET 81 MG: 81 TABLET CHEWABLE at 09:03

## 2022-02-11 RX ADMIN — VANCOMYCIN HYDROCHLORIDE 1000 MG: 1 INJECTION, POWDER, LYOPHILIZED, FOR SOLUTION INTRAVENOUS at 04:07

## 2022-02-11 RX ADMIN — SERTRALINE 25 MG: 50 TABLET, FILM COATED ORAL at 09:04

## 2022-02-11 RX ADMIN — METOPROLOL TARTRATE 5 MG: 5 INJECTION, SOLUTION INTRAVENOUS at 13:16

## 2022-02-11 RX ADMIN — DRONABINOL 2.5 MG: 2.5 CAPSULE ORAL at 09:03

## 2022-02-11 RX ADMIN — VANCOMYCIN HYDROCHLORIDE 1000 MG: 1 INJECTION, POWDER, LYOPHILIZED, FOR SOLUTION INTRAVENOUS at 13:22

## 2022-02-11 RX ADMIN — METRONIDAZOLE 500 MG: 500 INJECTION, SOLUTION INTRAVENOUS at 20:55

## 2022-02-11 RX ADMIN — HYOSCYAMINE SULFATE: 16 SOLUTION at 11:02

## 2022-02-11 RX ADMIN — Medication 1 CAPSULE: at 09:03

## 2022-02-11 RX ADMIN — ENOXAPARIN SODIUM 40 MG: 100 INJECTION SUBCUTANEOUS at 09:03

## 2022-02-11 RX ADMIN — MIDODRINE HYDROCHLORIDE 5 MG: 5 TABLET ORAL at 21:49

## 2022-02-11 RX ADMIN — FERROUS SULFATE TAB 325 MG (65 MG ELEMENTAL FE) 325 MG: 325 (65 FE) TAB at 16:25

## 2022-02-11 RX ADMIN — SODIUM CHLORIDE 1000 ML: 9 INJECTION, SOLUTION INTRAVENOUS at 16:06

## 2022-02-11 RX ADMIN — DRONABINOL 2.5 MG: 2.5 CAPSULE ORAL at 16:25

## 2022-02-11 RX ADMIN — ACETAMINOPHEN 650 MG: 325 TABLET ORAL at 13:00

## 2022-02-11 RX ADMIN — DOCUSATE SODIUM 50 MG AND SENNOSIDES 8.6 MG 2 TABLET: 8.6; 5 TABLET, FILM COATED ORAL at 21:48

## 2022-02-11 NOTE — PROGRESS NOTES
Orthopedic End of Shift Note    Bedside shift change report given to Regla Victor RN (oncoming nurse) by Johan Moreno RN (offgoing nurse). Report included the following information Intake/Output, MAR, Recent Results, Cardiac Rhythm a-fib, Alarm Parameters  and Quality Measures. POD#     Significant issues during shift: low blood pressures, HR in 140's and irregular, non blanchable areas to skin surrounding wounds    Issues for Physician to address: Dr. Jonell Duverney ordered 500 ml ns bolus , telemetry, and metoprolol . Dr. Mounika Traylor ordered veneflex ointment to DTI areas, and lidocaine jelly to wounds for pain with wound care. Dr. Jonell Duverney also ordered cardiology consult for new onset of A-fib .     Activity This Shift  (check all that apply) [] chair  [] dangle   [] bathroom  [] bedside commode [] hallway  [x] bedrest   Nausea/Vomiting [] yes [x] no     Voiding Status [] void [x] [place] [] I&O Cath   Bowel Movements [x] yes [] no     Foot Pumps or SCD [] yes [x] no    Ice Pack [] yes    [x] no    Incentive Spirometer [] yes [x] no Volume:      Telemetry Monitoring   [x] yes [] no Rhythm: a-fib   Supplemental O2 [x] yes [] no Sat off O2:   88%

## 2022-02-11 NOTE — PROGRESS NOTES
Admit Date: 2022    POD * No surgery found *    Procedure:  * No surgery found *      Assessment:   Active Problems:    Sepsis ([address] (2022)      Sacral pressure ulcer (2022)      1. Bacteremia with GPC in one bottle and GNR in another  2. 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 They likely will never heal.  Fecal diversion may help but comorbidities make it challenging  3. Chronic microcytic anemia.  hgb 8.3  4. Severe protein calorie malnutrition. Alb 2.6. This will prevent wound healing  5. Severe MS. Plan/Recommendations/Medical Decision Makin. Continue  strength Dakins TID over the weekend. Will reassess on  for possible sharp debridement  2. Continue abx  3. Avoid pressure on wounds    Subjective:     Patient has no new complaints. Objective:     Blood pressure 103/62, pulse (!) 130, temperature 98.8 °F (37.1 °C), resp. rate 18, height 6' 1\" (1.854 m), weight 69.9 kg (154 lb 1.6 oz), SpO2 98 %. Temp (24hrs), Av.6 °F (37.6 °C), Min:98.1 °F (36.7 °C), Max:102.8 °F (39.3 °C)      Physical Exam:  LUNG: clear to auscultation bilaterally, HEART: regular rate and rhythm, S1, S2 normal, no murmur, click, rub or gallop, ABDOMEN: soft, non-tender.  Bowel sounds normal. No masses,  no organomegaly  EXT:  Contractures  SKIN:  Buttock wounds are dressed    Labs:   Recent Results (from the past 48 hour(s))   POC LACTIC ACID    Collection Time: 22  7:16 PM   Result Value Ref Range    Lactic Acid (POC) 0.99 0.40 - 2.00 mmol/L   CULTURE, BLOOD    Collection Time: 22  7:20 PM    Specimen: Blood   Result Value Ref Range    Special Requests: NO SPECIAL REQUESTS      Culture result: NO GROWTH 2 DAYS     CULTURE, BLOOD    Collection Time: 22  7:20 PM    Specimen: Blood   Result Value Ref Range    Special Requests: NO SPECIAL REQUESTS      Culture result: (A)       STREPTOCOCCI, BETA HEMOLYTIC GROUP B GROWING IN 1 OF 2 BOTTLES DRAWN SITE = L WRIST    Culture result: (A)       GRAM NEGATIVE RODS GROWING IN THE 2ND OF 2 BOTTLES SITE = L WRIST    Culture result:       (NOTE) GPC AND GNR NOTIFIED [address] RN [facility]) ON 2/10/22 AT 1350. HH   URINALYSIS W/ REFLEX CULTURE    Collection Time: 02/09/22  7:20 PM    Specimen: Urine   Result Value Ref Range    Color DARK YELLOW      Appearance TURBID (A) CLEAR      Specific gravity 1.030 1.003 - 1.030      pH (UA) 8.0 5.0 - 8.0      Protein 100 (A) NEG mg/dL    Glucose Negative NEG mg/dL    Ketone 15 (A) NEG mg/dL    Bilirubin Negative NEG      Blood MODERATE (A) NEG      Urobilinogen 1.0 0.2 - 1.0 EU/dL    Nitrites Negative NEG      Leukocyte Esterase LARGE (A) NEG      WBC 10-20 0 - 4 /hpf    RBC 0-5 0 - 5 /hpf    Epithelial cells FEW FEW /lpf    Bacteria 1+ (A) NEG /hpf    UA:UC IF INDICATED URINE CULTURE ORDERED (A) CNI     METABOLIC PANEL, COMPREHENSIVE    Collection Time: 02/09/22  7:20 PM   Result Value Ref Range    Sodium 136 136 - 145 mmol/L    Potassium 4.2 3.5 - 5.1 mmol/L    Chloride 100 97 - 108 mmol/L    CO2 31 21 - 32 mmol/L    Anion gap 5 5 - 15 mmol/L    Glucose 110 (H) 65 - 100 mg/dL    BUN 23 (H) 6 - 20 MG/DL    Creatinine 0.39 (L) 0.70 - 1.30 MG/DL    BUN/Creatinine ratio 59 (H) 12 - 20      GFR est AA >60 >60 ml/min/1.73m2    GFR est non-AA >60 >60 ml/min/1.73m2    Calcium 8.3 (L) 8.5 - 10.1 MG/DL    Bilirubin, total 0.4 0.2 - 1.0 MG/DL    ALT (SGPT) 15 12 - 78 U/L    AST (SGOT) 24 15 - 37 U/L    Alk.  phosphatase 90 45 - 117 U/L    Protein, total 6.9 6.4 - 8.2 g/dL    Albumin 2.3 (L) 3.5 - 5.0 g/dL    Globulin 4.6 (H) 2.0 - 4.0 g/dL    A-G Ratio 0.5 (L) 1.1 - 2.2     CBC WITH AUTOMATED DIFF    Collection Time: 02/09/22  7:20 PM   Result Value Ref Range    WBC 11.8 (H) 4.1 - 11.1 K/uL    RBC 4.31 4.10 - 5.70 M/uL    HGB 10.2 (L) 12.1 - 17.0 g/dL    HCT 33.1 (L) 36.6 - 50.3 %    MCV 76.8 (L) 80.0 - 99.0 FL    MCH 23.7 (L) 26.0 - 34.0 PG    MCHC 30.8 30.0 - 36.5 g/dL    RDW 16.9 (H) 11.5 - 14.5 %    PLATELET 893 705 - 462 K/uL    MPV 9.8 8.9 - 12.9 FL    NRBC 0.0 0  WBC    ABSOLUTE NRBC 0.00 0.00 - 0.01 K/uL    NEUTROPHILS 91 (H) 32 - 75 %    LYMPHOCYTES 3 (L) 12 - 49 %    MONOCYTES 5 5 - 13 %    EOSINOPHILS 0 0 - 7 %    BASOPHILS 0 0 - 1 %    IMMATURE GRANULOCYTES 1 (H) 0.0 - 0.5 %    ABS. NEUTROPHILS 10.7 (H) 1.8 - 8.0 K/UL    ABS. LYMPHOCYTES 0.4 (L) 0.8 - 3.5 K/UL    ABS. MONOCYTES 0.6 0.0 - 1.0 K/UL    ABS. EOSINOPHILS 0.0 0.0 - 0.4 K/UL    ABS. BASOPHILS 0.0 0.0 - 0.1 K/UL    ABS. IMM.  GRANS. 0.1 (H) 0.00 - 0.04 K/UL    DF SMEAR SCANNED      RBC COMMENTS ANISOCYTOSIS  1+        RBC COMMENTS HYPOCHROMIA  1+       TROPONIN-HIGH SENSITIVITY    Collection Time: 02/09/22  7:20 PM   Result Value Ref Range    Troponin-High Sensitivity 14 0 - 76 ng/L   COVID-19 RAPID TEST    Collection Time: 02/09/22  7:20 PM   Result Value Ref Range    Specimen source Nasopharyngeal      COVID-19 rapid test Not detected NOTD     CULTURE, URINE    Collection Time: 02/09/22  7:20 PM    Specimen: Urine   Result Value Ref Range    Special Requests: NO SPECIAL REQUESTS  Reflexed from [id number]        [place] Count >100,000  COLONIES/mL        Culture result: (A)       GRAM NEGATIVE RODS IDENTIFICATION AND SUSCEPTIBILITY TO FOLLOW   CULTURE, WOUND W GRAM STAIN    Collection Time: 02/10/22  4:00 AM    Specimen: Sacral Wound   Result Value Ref Range    Special Requests: NO SPECIAL REQUESTS      GRAM STAIN 2+ WBCS SEEN      GRAM STAIN 1+ GRAM POSITIVE COCCI      GRAM STAIN FEW GRAM NEGATIVE RODS      Culture result: LIGHT GRAM NEGATIVE RODS (A)      Culture result: CHECKING FOR POSSIBLE STREPTOCOCCUS SPECIES (A)     PROCALCITONIN    Collection Time: 02/10/22  4:00 AM   Result Value Ref Range    Procalcitonin 4.26 ng/mL   CBC WITH AUTOMATED DIFF    Collection Time: 02/10/22  4:00 AM   Result Value Ref Range    WBC 6.6 4.1 - 11.1 K/uL    RBC 3.50 (L) 4.10 - 5.70 M/uL    HGB 8.3 (L) 12.1 - 17.0 g/dL    HCT 27.5 (L) 36.6 - 50.3 %    MCV 78.6 (L) 80.0 - 99.0 FL    MCH 23.7 (L) 26.0 - 34.0 PG    MCHC 30.2 30.0 - 36.5 g/dL    RDW 17.1 (H) 11.5 - 14.5 %    PLATELET 080 548 - 247 K/uL    MPV 9.9 8.9 - 12.9 FL    NRBC 0.0 0  WBC    ABSOLUTE NRBC 0.00 0.00 - 0.01 K/uL    NEUTROPHILS 86 (H) 32 - 75 %    LYMPHOCYTES 7 (L) 12 - 49 %    MONOCYTES 6 5 - 13 %    EOSINOPHILS 0 0 - 7 %    BASOPHILS 0 0 - 1 %    IMMATURE GRANULOCYTES 1 (H) 0.0 - 0.5 %    ABS. NEUTROPHILS 5.6 1.8 - 8.0 K/UL    ABS. LYMPHOCYTES 0.5 (L) 0.8 - 3.5 K/UL    ABS. MONOCYTES 0.4 0.0 - 1.0 K/UL    ABS. EOSINOPHILS 0.0 0.0 - 0.4 K/UL    ABS. BASOPHILS 0.0 0.0 - 0.1 K/UL    ABS. IMM. GRANS. 0.1 (H) 0.00 - 0.04 K/UL    DF SMEAR SCANNED      RBC COMMENTS ANISOCYTOSIS  1+       MAGNESIUM    Collection Time: 02/10/22  4:00 AM   Result Value Ref Range    Magnesium 1.9 1.6 - 2.4 mg/dL   PHOSPHORUS    Collection Time: 02/10/22  4:00 AM   Result Value Ref Range    Phosphorus 2.6 2.6 - 4.7 MG/DL   METABOLIC PANEL, COMPREHENSIVE    Collection Time: 02/10/22  4:00 AM   Result Value Ref Range    Sodium 138 136 - 145 mmol/L    Potassium 3.6 3.5 - 5.1 mmol/L    Chloride 106 97 - 108 mmol/L    CO2 27 21 - 32 mmol/L    Anion gap 5 5 - 15 mmol/L    Glucose 90 65 - 100 mg/dL    BUN 16 6 - 20 MG/DL    Creatinine 0.34 (L) 0.70 - 1.30 MG/DL    BUN/Creatinine ratio 47 (H) 12 - 20      GFR est AA >60 >60 ml/min/1.73m2    GFR est non-AA >60 >60 ml/min/1.73m2    Calcium 7.8 (L) 8.5 - 10.1 MG/DL    Bilirubin, total 0.3 0.2 - 1.0 MG/DL    ALT (SGPT) 16 12 - 78 U/L    AST (SGOT) 33 15 - 37 U/L    Alk.  phosphatase 70 45 - 117 U/L    Protein, total 6.3 (L) 6.4 - 8.2 g/dL    Albumin 1.9 (L) 3.5 - 5.0 g/dL    Globulin 4.4 (H) 2.0 - 4.0 g/dL    A-G Ratio 0.4 (L) 1.1 - 2.2     PROTHROMBIN TIME + INR    Collection Time: 02/10/22  4:00 AM   Result Value Ref Range    INR 1.3 (H) 0.9 - 1.1      Prothrombin time 13.2 (H) 9.0 - 11.1 sec   EKG, 12 LEAD, INITIAL    Collection Time: 02/10/22  5:32 AM Result Value Ref Range    Ventricular Rate 97 BPM    Atrial Rate 97 BPM    P-R Interval 128 ms    QRS Duration 98 ms    Q-T Interval 366 ms    QTC Calculation (Bezet) 464 ms    Calculated R Axis -29 degrees    Calculated T Axis 91 degrees    Diagnosis       ** Poor data quality, interpretation may be adversely affected  Recommend repeat  Normal sinus rhythm  Nonspecific ST and T wave abnormality  No previous ECGs available  Confirmed by Arabella Hicks MD, Rylee Garcia ([id number]) on 2/10/2022 1:32:07 PM     GLUCOSE, POC    Collection Time: 02/10/22  8:35 AM   Result Value Ref Range    Glucose (POC) 87 65 - 117 mg/dL    Performed by Celeste    GLUCOSE, POC    Collection Time: 02/10/22  1:29 PM   Result Value Ref Range    Glucose (POC) 103 65 - 117 mg/dL    Performed by Celeste    GLUCOSE, POC    Collection Time: 02/10/22  4:41 PM   Result Value Ref Range    Glucose (POC) 117 65 - 117 mg/dL    Performed by Nyla WELLINGTON    GLUCOSE, POC    Collection Time: 02/10/22  9:16 PM   Result Value Ref Range    Glucose (POC) 110 65 - 117 mg/dL    Performed by Yuri Navarro, POC    Collection Time: 02/11/22  7:20 AM   Result Value Ref Range    Glucose (POC) 91 65 - 117 mg/dL    Performed by Safia JEAN, POC    Collection Time: 02/11/22 12:07 PM   Result Value Ref Range    Glucose (POC) 99 65 - 117 mg/dL    Performed by Teri Baptiste        Data Review images and reports reviewed

## 2022-02-11 NOTE — PROGRESS NOTES
Patient oral temp is 101.2, otherwise asymptomatic. Informed Mandie Dhaliwal NP of this via [employer]. No new orders as of now. Will give PRN Tylenol and continue to monitor.

## 2022-02-11 NOTE — PROGRESS NOTES
Hospitalist Progress Note    NAME: Franco Dee   :  1950   MRN:  [id number]       Assessment / Plan:  Sepsis (tachycardia and fever) due to infected sacral and left ischium pressure ulcers with history of chronic osteomyelitis  CT scan showed 1. Deep soft tissue ulcerations extending to the bilateral ischial tuberosities  with associated acute osteomyelitis. 2.  Chronic osteomyelitis involving the sacrococcygeal junction with overlying  soft tissue ulceration and abscess. 3.  Bilateral femoral head avascular necrosis with subtle subchondral collapse  + Leukocytosis, fever   Blood cultures +ve for group B strep, GNR. F/u S&S. Consult ID, Check ECHO.   V/w Vanco, cefepime, change levaquin to flagyl  Repeat blood cultures until negative  Wound cultures +ve for GNR and possible strep  IVF  General surgery consulted, plan for debridement  Wound care consulted       Possible UTI in settings of indwelling mauro  Current antibiotics will cover  Change Mauro per hospital protocol  urine culture +ve for GNR, f/u S&S     MS with severe debility  bed bound   Continue Copaxone injections     Dysphagia  On regular diet at the nursing home per family request  Multiple MBS done in the past but showing some level of dysphagia  Family and patient is not interested in in speech eval or another MBS  Patient and family is aware of risk of aspiration and accepting it fully  HOB at 39 degree of the time  Aspiration precautions  Supervised feeding  Appetite stimulant     Chronic cough, frequent coughing spells  Neurogenic bladder with indwelling Mauro  CAD: Continue aspirin  Moderate protein calorie malnutrition: Dietary consult  Adult failure to thrive: Continue Remeron and Marinol  Functional quadriplegia  Depression: Continue Zoloft and Remeron  Hx of PE: Anticoagulation was discontinued due to recurrent hematuria          Code Status: full code d/w pt and niece at bedside   Surrogate Decision Maker: AUDREY Rodríguez ( ortho NP [facility]) [id number]  and brother Zoya Mason [phone]     DVT Prophylaxis: lovenox   GI Prophylaxis: not indicated     Baseline: Nursing home resident, bedbound, no children       18.5 - 24.9 Normal weight / Body mass index is 20.33 kg/m². Estimated discharge date: February 14  Barriers: Wound care, bacteremia, infection    Code status: Full  Prophylaxis: Lovenox  Recommended Disposition: SNF/LTC     Subjective:     Chief Complaint / Reason for Physician Visit  Patient seen and examined with niece at bedside. Patient states he does not have much of an appetite but he has not had an appetite for a long time now up to a year. He states that he does not have any worse pain than normal.  He is awake alert and oriented to person place and time. He is able to comprehend his plan of care. .  Discussed with RN events overnight. Patient had a fever at midnight    Review of Systems:  Symptom Y/N Comments  Symptom Y/N Comments   Fever/Chills    Chest Pain     Poor Appetite    Edema     Cough    Abdominal Pain     Sputum    Joint Pain     SOB/MATUTE    Pruritis/Rash     Nausea/vomit    Tolerating PT/OT     Diarrhea    Tolerating Diet     Constipation    Other       Could NOT obtain due to:      Objective:     VITALS:   Last 24hrs VS reviewed since prior progress note.  Most recent are:  Patient Vitals for the past 24 hrs:   Temp Pulse Resp BP SpO2   02/11/22 0802 98.2 °F (36.8 °C) 88 18 (!) 104/55 97 %   02/11/22 0246  91 20 (!) 98/55 97 %   02/11/22 0202 100.1 °F (37.8 °C)       02/11/22 0008 (!) 101.2 °F (38.4 °C)       02/10/22 2300 (!) 102.8 °F (39.3 °C) (!) 102 20 (!) 119/59 98 %   02/10/22 1937 98.3 °F (36.8 °C) 98 20 102/62 100 %   02/10/22 1545 98.1 °F (36.7 °C) 81 20 (!) 105/55 100 %   02/10/22 1444  81 18 (!) 100/50 99 %       Intake/Output Summary (Last 24 hours) at 2/11/2022 1019  Last data filed at 2/11/2022 0231  Gross per 24 hour   Intake 300 ml   Output 1200 ml   Net -900 ml        I had a face to face encounter and independently examined this patient on 2/11/2022, as outlined below:  PHYSICAL EXAM:  General: WD, WN. Alert, cooperative, no acute distress , Significantly debilitated, frail, temporal wasting  EENT:  EOMI. Anicteric sclerae. MMM  Resp:  CTA bilaterally, no wheezing or rales. No accessory muscle use  CV:  Regular  rhythm,  No edema  GI:  Soft, Non distended, Non tender. +Bowel sounds  Neurologic:  Alert and oriented X 3, normal speech,   Psych:   Good insight. Not anxious nor agitated  Skin:  No rashes. No jaundice, see wound care for ulcers    Reviewed most current lab test results and cultures  YES  Reviewed most current radiology test results   YES  Review and summation of old records today    NO  Reviewed patient's current orders and MAR    YES  PMH/ reviewed - no change compared to H&P  ________________________________________________________________________  Care Plan discussed with:    Comments   Patient     Family      RN     Care Manager     Consultant                        Multidiciplinary team rounds were held today with , nursing, pharmacist and clinical coordinator. Patient's plan of care was discussed; medications were reviewed and discharge planning was addressed. ________________________________________________________________________  Total NON critical care TIME:  33   Minutes    Total CRITICAL CARE TIME Spent:   Minutes non procedure based      Comments   >50% of visit spent in counseling and coordination of care     ________________________________________________________________________  Villa Major MD     Procedures: see electronic medical records for all procedures/Xrays and details which were not copied into this note but were reviewed prior to creation of Plan. LABS:  I reviewed today's most current labs and imaging studies.   Pertinent labs include:  Recent Labs     02/10/22  0400 02/09/22  1920   WBC 6.6 11.8*   HGB 8.3* 10.2* HCT 27.5* 33.1*    346     Recent Labs     02/10/22  0400 02/09/22 1920    136   K 3.6 4.2    100   CO2 27 31   GLU 90 110*   BUN 16 23*   CREA 0.34* 0.39*   CA 7.8* 8.3*   MG 1.9  --    PHOS 2.6  --    ALB 1.9* 2.3*   TBILI 0.3 0.4   ALT 16 15   INR 1.3*  --        Signed: Lamonte Guzman MD

## 2022-02-11 NOTE — PROGRESS NOTES
End of Shift Note    Bedside shift change report given to Deepali Collins RN (oncoming nurse) by Hakeem Ling LPN (offgoing nurse). Report included the following information SBAR, Kardex, Intake/Output, MAR and Recent Results    Shift worked:  night     Shift summary and any significant changes:          Concerns for physician to address:       Zone phone for oncoming shift:   [phone]       Activity:  Activity Level: Bed Rest  Number times ambulated in hallways past shift: 0  Number of times OOB to chair past shift: 0    Cardiac:   Cardiac Monitoring: No      Cardiac Rhythm: Sinus Rhythm    Access:   Current line(s): PIV     Genitourinary:   Urinary status: mauro    Respiratory:   O2 Device: Nasal cannula  Chronic home O2 use?: NO  Incentive spirometer at bedside: YES     GI:  Last Bowel Movement Date: 02/10/22  Current diet:  ADULT DIET Regular  ADULT ORAL NUTRITION SUPPLEMENT Breakfast, Lunch, Dinner; Diabetic Supplement  Passing flatus: YES  Tolerating current diet: YES       Pain Management:   Patient states pain is manageable on current regimen: YES    Skin:  Lewis Score: 9  Interventions: speciality bed, float heels, foam dressing, PT/OT consult, internal/external urinary devices and nutritional support     Patient Safety:  Fall Score:  Total Score: 3  Interventions: assistive device (walker, cane, etc), gripper socks, pt to call before getting OOB and stay with me (per policy)  High Fall Risk: Yes    Length of Stay:  Expected LOS: - - -  Actual LOS: 1      Hakeem Ling LPN

## 2022-02-11 NOTE — PROGRESS NOTES
Transition of Care Plan:  RUR: 17% moderate  Disposition: return to [facility] and [facility] for LTC  Follow up appointments: PCP, speciality   DME needed: none anticipated   Transportation at Discharge: [address] [facility] or means to access home: n/a     IM Medicare Letter: to be provided  Is patient a BCPI-A Bundle: no   If yes, was Bundle Letter given?:    Is patient a  and connected with the [place]? yes              If yes, was [place] transfer form completed and VA notified? VA has been notified, waiting on MD to complete his portion of transfer paperwork  Caregiver Contact: Anthony Heller [phone]  Discharge Caregiver contacted prior to discharge? yes  Care Conference needed?: not at this time                  Reason for Admission:   sepsis                  RUR Score: 17%                 PCP: First and Last name:   Monica Rodriguez MD   Name of Practice: [facility]     Do you (patient/family) have any concerns for transition/discharge? Not at this time                   Plan for utilizing home health: per recommendation       Current Advanced Directive/Advance Care Plan:  Terrance Bernardo (ACP) Conversation      Date of Conversation: 2/11/22  Conducted with: Healthcare Decision Maker: Named in Advance Directive or Healthcare Power of  Chap Brenden:   No healthcare decision makers have been documented. Click here to complete [place] including selection of the Healthcare Decision Maker Relationship (ie \"Primary\")    Today we documented Decision Maker(s) consistent with ACP documents on file. Content/Action Overview:    Has ACP document(s) on file - reflects the patient's care preferences  Reviewed DNR/DNI and patient elects Full Code (Attempt Resuscitation)    Healthcare Decision Maker:   Click here to complete [place] including selection of the Healthcare Decision Maker Relationship (ie \"Primary\") Transition of Care Plan:                          CM made room visit with patient, brother Santosh Betancourt [place], and niece Asiya Mike [place], (formerly [employer]). Demographics, insurance, and emergency contact confirmed. CM was also requested to add [facility]illing [facility]  [phone], to pt's emergency contact list, CM updated chart as well. Pt is a LTC resident at [address] [place] [address]. Pt is wheelchair bound, uses adonis lift to get to and from bed to wheelchair. Pt requires total care, including with feeding. [facility] [place] [facility] Family signed [facility] transfer paperwork requesting transfer if bed available. CM has placed paperwork on pt's bedside chart and requested MD to complete his portion so it can be sent to the [address] [place] [address]. In the meantime, CM has sent clinicals to the [address] [place] [address]. CM indicated on fax face sheet that we are waiting on completed VA transfer paperwork. Care Management Interventions  PCP Verified by CM:  Yes  Mode of Transport at Discharge: BLS  Transition of Care Consult (CM Consult): Discharge Planning  Discharge Durable Medical Equipment: No  Physical Therapy Consult: No  Occupational Therapy Consult: No  Speech Therapy Consult: No  Support Systems: Other Family Member(s)  Confirm Follow Up Transport: Wheelchair [employer]  Discharge Location  [facility] to [facility] [address] [facility]

## 2022-02-11 NOTE — PROGRESS NOTES
Comprehensive Nutrition Assessment    Type and Reason for Visit: Initial,Wound    Nutrition Recommendations/Plan:   Continue regular diet  Nepro BID (highest kcal & protein supplement)  Please document % meals and supplements consumed in flowsheet I/O's under intake     Nutrition Assessment:      Auto-referral triggered by documented wounds. Pt noted for sepsis, UTI, dysphagia, OM, dysphagia, DM, MS, hx stroke, chronic stage IV wounds, CAD, FTT, functional quadriplegia. Dysphagia noted with known risk of aspiration, pt and family decline MBS or SLP consult. Possible plans for debridement of wounds. Pt reports a poor appetite for quite some time, but denies significant weight loss. Pt reports his usual weight is around 130lb, not consistent with current last documented weights. He drinks one Ensure daily at home, and agreed to try drinking two shakes daily while his meal intake is low. Feeding assistance needed. Patient Vitals for the past 168 hrs:   % Diet Eaten   02/10/22 1230 26 - 50%   02/10/22 0930 26 - 50%     Wt Readings from Last 5 Encounters:   02/09/22 69.9 kg (154 lb 1.6 oz)   08/06/21 69 kg (152 lb 1.9 oz)   07/20/16 68.9 kg (152 lb)   05/24/16 68 kg (150 lb)   05/20/16 67.1 kg (148 lb)   ]    Malnutrition Assessment:  Malnutrition Status:  Severe malnutrition    Context:  Chronic illness     Findings of the 6 clinical characteristics of malnutrition:   Energy Intake:  Mild decrease in energy intake (specify)  Weight Loss:  Unable to assess     Body Fat Loss:  7 - Severe body fat loss, Orbital,Fat overlying ribs,Buccal region   Muscle Mass Loss:  7 - Severe muscle mass loss, Clavicles (pectoralis &deltoids),Hand (interosseous),Scapula (trapezius),Temples (temporalis)  Fluid Accumulation:  No significant fluid accumulation,     Strength:  Not performed         Estimated Daily Nutrient Needs:  Energy (kcal): 2167 kcals (BMR 1513 x 1.3AF + 250);  Weight Used for Energy Requirements: Current  Protein (g): 91-105g (1.3-1.5g/kg); Weight Used for Protein Requirements: Current  Fluid (ml/day): 2150mL; Method Used for Fluid Requirements: 1 ml/kcal      Nutrition Related Findings:  Labs: reviewed. Meds: cefepime, marinol, ferrous sulfate, humalog, risaquad, flagyl, remeron, pericolace, vancomycin. BM 2/10.       Wounds:    Pressure injury,Stage IV,Multiple       Current Nutrition Therapies:  ADULT DIET Regular  ADULT ORAL NUTRITION SUPPLEMENT Breakfast, Lunch, Dinner; Diabetic Supplement  DIET ONE TIME MESSAGE    Anthropometric Measures:  · Height:  6' 1\" (185.4 cm)  · Current Body Wt:  69.9 kg (154 lb 1.6 oz)   · Ideal Body Wt:  184 lbs:  83.8 %   · BMI Category:  Underweight (BMI less than 22) age over 72       Nutrition Diagnosis:   · Severe malnutrition,In context of acute illness or injury related to inadequate protein-energy intake as evidenced by severe loss of subcutaneous fat,severe muscle loss,poor intake prior to admission,intake 0-25%    · Increased nutrient needs related to  (wound healing) as evidenced by  (multiple chronic stage IV wounds)      Nutrition Interventions:   Food and/or Nutrient Delivery: Continue current diet,Continue oral nutrition supplement  Nutrition Education and Counseling: No recommendations at this time  Coordination of Nutrition Care: Continue to monitor while inpatient    Goals:  PO intake >50% meals and supplements next 3-5 days       Nutrition Monitoring and Evaluation:   Behavioral-Environmental Outcomes: None identified  Food/Nutrient Intake Outcomes: Food and nutrient intake,Supplement intake  Physical Signs/Symptoms Outcomes: Biochemical data,GI status,Weight,Skin,Nutrition focused physical findings    Discharge Planning:    Continue current diet,Continue oral nutrition supplement     Electronically signed by Liana Roberto RD on 2/11/2022 at 4:39 PM    Contact: [facility] [place] [facility] [phone]

## 2022-02-11 NOTE — PROGRESS NOTES
Hospitalist Progress Note    NAME: Ngozi Nguyen   :  1950   MRN:  [id number]       Assessment / Plan:  Sepsis (tachycardia and fever)   Stage IV sacral and ischial ulcers, Rt lateral ankle ulcer  due to infected sacral and left ischium pressure ulcers with history of chronic osteomyelitis  Admit to telemetry  + Leukocytosis, fever 102  Follow-up blood cultures, lactic acid, procalcitonin  Cont IVF and broad spectrum IV abx  -cont home meds for pain control fo now   Appreciate wound care consult  General surgery consult  Consider ID consult once cultures are back      Possible UTI in settings of indwelling maruo  Current antibiotics will cover  Change Mauro per hospital protocol  Follow-up urine culture     MS with severe debility  bed bound   Continue Copaxone injections     Dysphagia  On regular diet at the nursing home per family request  Multiple MBS done in the past but showing some level of dysphagia  Family and patient is not interested in in speech eval or another MBS  Patient and family is aware of risk of aspiration and accepting it fully  HOB at 45 degree of the time  Aspiration precautions  Supervised feeding     Chronic cough, frequent coughing spells  Neurogenic bladder with indwelling Mauro  CAD: Continue aspirin  Moderate protein calorie malnutrition: Dietary consult  Adult failure to thrive: Continue Remeron and Marinol  Functional quadriplegia  Depression: Continue Zoloft and Remeron  Hx of PE: Anticoagulation was discontinued due to recurrent hematuria        Pharmacy consulted for med check ( med list was brought from the facility with pt, reviewed )         Code Status: full code d/w pt and niece at bedside   Surrogate Decision Maker: MPOA niece [place] ( [place] [id number] [facility]) [phone]  and brother Torrie Browning [phone]     DVT Prophylaxis: lovenox   GI Prophylaxis: not indicated     Baseline: Nursing home resident, bedbound, no children      Subjective:     Chief Complaint / Reason for Physician Visit  Patient admits 4/10 abd pain    Discussed with RN events overnight. Review of Systems:  Symptom Y/N Comments  Symptom Y/N Comments   Fever/Chills n   Chest Pain n    Poor Appetite n   Edema n    Cough y chronic  Abdominal Pain y    Sputum n   Joint Pain     SOB/MATUTE n   Pruritis/Rash     Nausea/vomit y   Tolerating PT/OT     Diarrhea n   Tolerating Diet     Constipation n   Other       Could NOT obtain due to:      Objective:     VITALS:   Last 24hrs VS reviewed since prior progress note.  Most recent are:  Patient Vitals for the past 24 hrs:   Temp Pulse Resp BP SpO2   02/10/22 1937 98.3 °F (36.8 °C) 98 20 102/62 100 %   02/10/22 1545 98.1 °F (36.7 °C) 81 20 (!) 105/55 100 %   02/10/22 1444  81 18 (!) 100/50 99 %   02/10/22 0930  82 16 (!) 97/46 100 %   02/10/22 0830 97.5 °F (36.4 °C) 82 18 (!) 120/54 100 %   02/10/22 0706  89 21 (!) 121/51 100 %   02/10/22 0644  85 14 120/62 100 %   02/10/22 0630    (!) 102/57    02/10/22 0629  84 18  100 %   02/10/22 0615  85 (!) 31 (!) 99/54 100 %   02/10/22 0553  85 19 (!) 106/53 100 %   02/10/22 0538  83 17 (!) 99/53 100 %   02/10/22 0523  85 20 (!) 117/51 100 %   02/10/22 0520  87 20 105/60 100 %   02/10/22 0505  87 18 (!) 101/54 93 %   02/10/22 0441  85 16 (!) 97/58 97 %   02/10/22 0426  87 20 (!) 94/53 92 %   02/10/22 0411  87 21 (!) 97/54 94 %   02/10/22 0356  89 21 (!) 95/49 93 %   02/10/22 0341  91 22 (!) 93/50 96 %   02/10/22 0330  89 17 (!) 105/52 98 %   02/10/22 0315  90 20 (!) 110/49 95 %   02/10/22 0253 99.4 °F (37.4 °C) 96 21 (!) 100/57 96 %   02/10/22 0230     95 %   02/10/22 0225     93 %   02/10/22 0220     95 %   02/10/22 0215    (!) 74/44        Intake/Output Summary (Last 24 hours) at 2/10/2022 2340  Last data filed at 2/10/2022 1432  Gross per 24 hour   Intake 1200 ml   Output 1250 ml   Net -50 ml        I had a face to face encounter and independently examined this patient on 2/10/2022, as outlined below:  PHYSICAL EXAM:  General: WD, WN. Alert, cooperative, no acute distress    EENT:  EOMI. Anicteric sclerae. MMM  Resp:  CTA bilaterally, no wheezing or rales. No accessory muscle use  CV:  Regular  rhythm,  No edema  GI:  Soft, Non distended, Non tender. +Bowel sounds  Neurologic:  Alert and oriented X 3, normal speech,   Psych:   Good insight. Not anxious nor agitated  Skin:  No rashes. No jaundice, stage IV ulcers sacrum, b/l ischium, Rt ankle, see wound care RN pics    Reviewed most current lab test results and cultures  YES  Reviewed most current radiology test results   YES  Review and summation of old records today    NO  Reviewed patient's current orders and MAR    YES  PMH/SH reviewed - no change compared to H&P  ________________________________________________________________________  Care Plan discussed with:    Comments   Patient x    Family  x niece   RN x    Care Manager     Consultant                        Multidiciplinary team rounds were held today with , nursing, pharmacist and clinical coordinator. Patient's plan of care was discussed; medications were reviewed and discharge planning was addressed. ________________________________________________________________________  Total NON critical care TIME: 35   Minutes    Total CRITICAL CARE TIME Spent:   Minutes non procedure based      Comments   >50% of visit spent in counseling and coordination of care x    ________________________________________________________________________  Dajuan Mo DO     Procedures: see electronic medical records for all procedures/Xrays and details which were not copied into this note but were reviewed prior to creation of Plan. LABS:  I reviewed today's most current labs and imaging studies.   Pertinent labs include:  Recent Labs     02/10/22  0400 02/09/22 1920   WBC 6.6 11.8*   HGB 8.3* 10.2*   HCT 27.5* 33.1*    346     Recent Labs     02/10/22  0400 02/09/22 1920  136   K 3.6 4.2    100   CO2 27 31   GLU 90 110*   BUN 16 23*   CREA 0.34* 0.39*   CA 7.8* 8.3*   MG 1.9  --    PHOS 2.6  --    ALB 1.9* 2.3*   TBILI 0.3 0.4   ALT 16 15   INR 1.3*  --        Signed: Varun Mendoza,

## 2022-02-11 NOTE — CONSULTS
Infectious Disease Consult    Date of Consultation:  2/11/22  Date of Admission: 2/9/2022   Referring Physician: Dr Mike Castano:     Patient is a 80 y.o. male who is being seen for -bacteremia , sacral & ischial ulcers      IMPRESSION:     -Sepsis    -Bacteremia with beta hemolytic Gp B Strep 1/2 ,GNR 2nd of 2  BC- 2/9 -Gp B beta hemolytic Strep 1/2, GNR 2nd of 2 bottles. -Infected sacral & ischial ulcers   Wound cultures-2/9-light GNR, possible strep spp    -Chronic OM of sacrococcygeal junction,acute OM ischial tuberosities. CT scan-Deep soft tissue ulcerations extending to the bilateral ischial tuberosities  with associated acute osteomyelitis. Chronic osteomyelitis involving the sacrococcygeal junction with overlying  soft tissue ulceration and abscess. Bilateral femoral head avascular necrosis with subtle subchondral collapse. -UTI with GNR, indwelling mauro, neurogenic bladder  UC + for >100,000 GNR    -Severe MS   bedbound, functional quadriplegia     -Dysphagia    - Chronic cough    -Severe protein calory malnutrition    - Elevated A1c 6.6 ? Diabetes mellitus    -ESR 73, CRP11.2         PLAN:        -Continue empiric Vancomycin, Cefepime, Flagyl IV  -Pharmacy on consult for dosing, target trough 15- 20  -Wound Care per Wound care, Surgery recommendations  - Blood sugar control. Kenny Carrillo is a 80year old male with history DM, MS, Stroke who presented on 2/9 with sealock wounds on sacrum and ischium. He has bilaterally with  fever. Patient is a resident of SNF, found to have fever and tachycardia on morning of admission with worsening of his chronic wounds to sacrum, heels. Patient sating 90% on room air, placed on o2. Per ED note he was  oriented to self, stable neuro exam at time of admission. Patient tachycardic to 126, /70, fecbrile to 101.6 en route. Unknown vaccination status. Patient reports pain to his bottom which has been worsening over the past week approximately. When speaking to his niece Crystal Olivier who is NP with orthopedics, I have learned that the best been dealing with chronic sacral wounds, sacral osteomyelitis. Usually receives treatment at the [address] [place] [address]. Patient has developed newly skin wounds bilaterally which have been progressing over the past month. Patient has diagnosis of chronic  osteomyelitis of sacrum, recurrent UTI, pneumonia. Right ankle wound has been present for 1 year. CT pelvis-2/9   As follows  FINDINGS:  Bowel: Partially imaged, within normal limits.     Reproductive organs: Within normal limits.     Soft tissues: No pelvic mass or lymphadenopathy.     Fluid: No ascites or drainable fluid collection     Bones: Chronic appearing erosive changes and fragmentation at the sacrococcygeal  junction. Approximately 6.2 cm x 0.8 cm x 10.7 cm amorphous thick-walled mottled  fluid and gas collection in the presacral soft tissues with overlying skin  ulceration, likely an abscess. Deep soft tissue ulceration extending to the  bilateral ischial tuberosities. No aggressive patchy osteolysis at ischial  tuberosities concerning for acute osteoarthritis. Degenerative changes in the  spine. Bilateral femoral head avascular necrosis with subtle subchondral  collapse     Miscellaneous: Atherosclerosis. Bladder decompressed with Guthrie in place     IMPRESSION  1. Deep soft tissue ulcerations extending to the bilateral ischial tuberosities  with associated acute osteomyelitis. 2.  Chronic osteomyelitis involving the sacrococcygeal junction with overlying  soft tissue ulceration and abscess. 3.  Bilateral femoral head avascular necrosis with subtle subchondral collapse    D/w wound care. Also RN regarding current status of wounds. Currently attending clean. Wound cultures done on 2/9+ for light GNR, multiple strep species. Blood culture-2/9+ for group B beta-hemolytic strep 1/2, GNR 2nd of tuberculous. Urine culture- 2/9 ->100,000 GNR. Patient has been febrile.  T-max as follows  2/.2, 2/10- 102.8, 2/9-101.6. Patient seen today. He appears comfortable. Denies complaints at this time. Discussed with patient's niece and RN. Patient Active Problem List   Diagnosis Code    Sepsis ([facility] A41.9    Sacral pressure ulcer L89.159     Past Medical History:   Diagnosis Date    MS (multiple sclerosis) ([facility]     Stroke ([facility]       No family history on file. Social History     Tobacco Use    Smoking status: Never Smoker    Smokeless tobacco: Not on file   Substance Use Topics    Alcohol use: No     Past Surgical History:   Procedure Laterality Date    HX CORONARY STENT PLACEMENT      x7       Prior to Admission medications    Medication Sig Start Date End Date Taking? Authorizing Provider   acetaminophen (TYLENOL) 500 mg tablet Take 1,000 mg by mouth every eight (8) hours as needed (moderate pain). Yes Provider, Historical   ascorbic acid, vitamin C, (VITAMIN C) 500 mg tablet Take 500 mg by mouth daily. Yes Provider, Historical   aspirin delayed-release 81 mg tablet Take 81 mg by mouth daily. Yes Provider, Historical   bisacodyL (DULCOLAX) 10 mg supp Insert 10 mg into rectum daily as needed for Constipation. Yes Provider, Historical   cholecalciferol (VITAMIN D3) (2,000 UNITS /50 MCG) cap capsule Take 2,000 Units by mouth daily. Yes Provider, Historical   cyanocobalamin (VITAMIN B12) 100 mcg tablet Take 200 mcg by mouth daily. Yes Provider, Historical   docusate sodium (COLACE) 100 mg capsule Take 100 mg by mouth nightly. Yes Provider, Historical   ferrous sulfate 325 mg (65 mg iron) tablet Take 325 mg by mouth every other day. Yes Provider, Historical   folic acid (FOLVITE) 1 mg tablet Take 2 mg by mouth daily. Yes Provider, Historical   glatiramer (COPAXONE) 40 mg/mL syrg 40 mg by SubCUTAneous route every Monday, Wednesday, Friday. Yes Provider, Historical   polyethylene glycol (Miralax) 17 gram packet Take 17 g by mouth daily.    Yes Provider, Historical guaiFENesin (ORGANIDIN) 200 mg tablet Take 400 mg by mouth every eight (8) hours as needed for Congestion. Yes Provider, Historical   albuterol-ipratropium (DUO-NEB) 2.5 mg-0.5 mg/3 ml nebu 3 mL by Nebulization route every four (4) hours as needed for Wheezing or Shortness of Breath. Yes Provider, Historical   lidocaine-vit e-aloe vera-wayne 2 % topical gel Apply 1 Each to affected area Every Thursday. Apply to ischiums and right ankle prior to wound care visit   Yes Provider, Historical   lidocaine-vit e-aloe vera-wayne 2 % topical gel Apply 1 Each to affected area Every Thursday. Apply to sacrum prior to wound care   Yes Provider, Historical   dronabinoL (MARINOL) 2.5 mg capsule Take 2.5 mg by mouth two (2) times a day. Yes Provider, Historical   mirtazapine (REMERON) 30 mg tablet Take  by mouth nightly. Yes Provider, Historical   therapeutic multivitamin (THERAGRAN) tablet Take 1 Tablet by mouth daily. Yes Provider, Historical   insulin aspart U-100 (NOVOLOG) 100 unit/mL injection by SubCUTAneous route Before breakfast, lunch, and dinner. 150-200 = 2 units  201-250 = 4 units  251-300 = 5 units  301-350 = 7 units  351-400 = 9 units  401+ = 10 units   Yes Provider, Historical   omeprazole (PRILOSEC) 40 mg capsule Take 40 mg by mouth daily. Yes Provider, Historical   oxyCODONE IR (ROXICODONE) 5 mg immediate release tablet Take 5 mg by mouth every four (4) hours as needed for Pain. Yes Provider, Historical   senna (SENOKOT) 8.6 mg tablet Take 2 Tablets by mouth nightly. Yes Provider, Historical   sertraline (ZOLOFT) 25 mg tablet Take 25 mg by mouth daily. Yes Provider, Historical   zinc sulfate (Zinc-220) 50 mg zinc (220 mg) capsule Take 1 Capsule by mouth every other day.    Yes Provider, Historical     Allergies   Allergen Reactions    Beeswax Anaphylaxis    Bee Pollen Unknown (comments)    Simvastatin Not Reported This Time    Tolterodine Not Reported This Time    Venom-Wasp Not Reported This Time        Review of Systems:  A comprehensive review of systems was negative except for that written in the History of Present Illness. 14 point review of systems obtained . All other systems negative    Objective:   Blood pressure 122/61, pulse 86, temperature 99.6 °F (37.6 °C), resp. rate 18, height 6' 1\" (1.854 m), weight 154 lb (69.9 kg), SpO2 99 %.   Temp (24hrs), Av.6 °F (37 °C), Min:97.9 °F (36.6 °C), Max:99.6 °F (37.6 °C)    Current Facility-Administered Medications   Medication Dose Route Frequency    cefTRIAXone (ROCEPHIN) 2 g in 0.9% sodium chloride (MBP/ADV) 50 mL MBP  2 g IntraVENous Q24H    morphine injection 2 mg  2 mg IntraVENous Q4H PRN    amiodarone (CORDARONE) 375 mg/250 mL D5W infusion  0.5 mg/min IntraVENous CONTINUOUS    midodrine (PROAMATINE) tablet 5 mg  5 mg Oral TID WITH MEALS    sodium hypochlorite (HALF STRENGTH DAKIN'S) 0.25% irrigation (bottle)   Topical BID    metroNIDAZOLE (FLAGYL) IVPB premix 500 mg  500 mg IntraVENous Q12H    [Held by provider] metoprolol tartrate (LOPRESSOR) tablet 12.5 mg  12.5 mg Oral Q12H    balsam peru-castor oiL (VENELEX) ointment   Topical BID    ferrous sulfate tablet 325 mg  325 mg Oral EVERY OTHER DAY    lidocaine/EPINEPHrine/tetracaine (L.E.T) topical gel  3 mL Topical Q12H    digoxin (LANOXIN) tablet 0.125 mg  0.125 mg Oral DAILY    dextrose 10% infusion 125-250 mL  125-250 mL IntraVENous PRN    oxyCODONE IR (ROXICODONE) tablet 5-10 mg  5-10 mg Oral Q4H PRN    sodium chloride (NS) flush 5-10 mL  5-10 mL IntraVENous PRN    acetaminophen (TYLENOL) tablet 650 mg  650 mg Oral Q6H PRN    ondansetron (ZOFRAN) injection 4 mg  4 mg IntraVENous Q6H PRN    aspirin chewable tablet 81 mg  81 mg Oral DAILY    albuterol-ipratropium (DUO-NEB) 2.5 MG-0.5 MG/3 ML  3 mL Nebulization Q4H PRN    guaiFENesin (ROBITUSSIN) 100 mg/5 mL oral liquid 200 mg  200 mg Oral Q6H PRN    dronabinoL (MARINOL) capsule 2.5 mg  2.5 mg Oral ACB&D    mirtazapine (REMERON) tablet 30 mg  30 mg Oral QHS    insulin lispro (HUMALOG) injection   SubCUTAneous AC&HS    glucose chewable tablet 16 g  4 Tablet Oral PRN    glucagon (GLUCAGEN) injection 1 mg  1 mg IntraMUSCular PRN    pantoprazole (PROTONIX) tablet 40 mg  40 mg Oral ACB    senna-docusate (PERICOLACE) 8.6-50 mg per tablet 2 Tablet  2 Tablet Oral QHS    sertraline (ZOLOFT) tablet 25 mg  25 mg Oral DAILY    glatiramer (COPAXONE) injection 20 mg  20 mg SubCUTAneous Q MON, WED & FRI    0.9% sodium chloride infusion  125 mL/hr IntraVENous CONTINUOUS    L.acidophilus-paracasei-S.thermophil-bifidobacter (RISAQUAD) 8 billion cell capsule  1 Capsule Oral DAILY    enoxaparin (LOVENOX) injection 40 mg  40 mg SubCUTAneous DAILY        Exam:    General:  Awake, cooperative,contracted   Eyes:  Sclera anicteric. Pupils equally round and reactive to light. Mouth/Throat: Mucous membranes normal, oral pharynx clear   Neck: Supple   Lungs:   Clear to auscultation bilaterally, good effort   CV:  Regular rate and rhythm,no murmur, click, rub or gallop   Abdomen:   Soft, non-tender.  bowel sounds normal. non-distended   Extremities: No  edema   Skin: Skin color, texture, turgor normal. no acute rash or lesions   Lymph nodes: Cervical and supraclavicular normal   Musculoskeletal: No swelling or deformity   Lines/Devices:  Intact, no erythema, drainage or tenderness   Psych: Alert and oriented, normal mood affect        Data Reviewed:   CBC:   Recent Labs     02/13/22 0433 02/12/22  0342   WBC 6.6 8.6   RBC 3.47* 3.61*   HGB 8.1* 8.5*   HCT 26.9* 29.0*    313   GRANS 72 82*   LYMPH 15 9*   EOS 3 0     CMP:   Recent Labs     02/13/22  0433 02/12/22  0342   * 121*    139   K 3.3* 3.6   * 109*   CO2 25 24   BUN 7 10   CREA 0.29* 0.32*   CA 7.9* 8.2*   AGAP 4* 6   BUCR 24* 31*       Lab Results   Component Value Date/Time    Culture result: NO GROWTH AFTER 4 HOURS 02/12/2022 03:42 AM    Culture result: NO GROWTH AFTER 20 HOURS 02/11/2022 11:04 AM    Culture result: LIGHT PROTEUS MIRABILIS (A) 02/10/2022 04:00 AM    Culture result: (A) 02/10/2022 04:00 AM     LIGHT STREPTOCOCCI, BETA HEMOLYTIC GROUP B Penicillin and ampicillin are drugs of choice for treatment of beta-hemolytic streptococcal infections. Susceptibility testing of penicillins and beta-lactams approved by the FDA for treatment of beta-hemolytic streptococcal infections need not be performed routinely, because nonsusceptible isolates are extremely rare. CLSI 2012    Culture result: LIGHT ALPHA STREPTOCOCCUS (A) 02/10/2022 04:00 AM    Culture result: (A) 02/10/2022 04:00 AM     MODERATE BACTEROIDES UNIFORMIS BETA LACTAMASE NEGATIVE          XR Results (most recent)reviewed:  Results from [place] encounter on 02/09/22    XR CHEST PORT    Narrative  EXAM:  XR CHEST PORT    INDICATION: Eval for infiltrate    COMPARISON: Chest radiographs 7/20/2016    TECHNIQUE: Semiupright portable chest AP view    FINDINGS:    Cardiac silhouette within normal limits. Aorta is tortuous. Several irregular  linear opacities in the right mid and upper lung, again may reflect fibrosis. No  focal consolidation. No definite pleural effusion or pneumothorax. Impression  No consolidative pneumonia. Redemonstrated suspected right lung  fibrotic changes. ICD-10-CM ICD-9-CM    1. Cellulitis of buttock  L03.317 682.5    2. Acute osteomyelitis of pelvic region, unspecified laterality ([facility]  M86.159 730.05            Antibiotic History    I have discussed the diagnosis with the patient and the intended plan as seen in the above orders. I have discussed medication side effects and warnings with the patient as well.     Reviewed test results at length with patient    Signed By: Sah León MD FACP

## 2022-02-11 NOTE — CONSULTS
CARDIOLOGY CONSULT    Patient ID:  Patient: Johan Berkowitz  MRN: [id number]  Age: 80 y.o.  : 1950    Date of  Admission: 2022  6:34 PM   PCP:  Ami Nguyen MD    Assessment: 1. Atrial fibrillation with RVR of onset after , an ECG shows sinus that day. A new diagnosis per family Matt Giron NP is his niece). Hypotension with rate control therapy. 2. CAD with remote coronary stenting. No angina. 3. Normal LV systolic function on echo. 4. 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 5. Sepsis, improving. Multiple sites for bacterial entry. 6. Multiple sclerosis with severe debility (functional quadriplegic). 7. Remote stroke in .  8. Full code. Plan:     1. Will add digoxin for adjunctive rate control prophylaxis atop low dose metoprolol. 2. On DVT prophylaxis, not a good chronic full anticoagulation candidate due to bleeding risk, anemia, etc.  Discussed the status of his wounds and bleeding risk from these with wound care, the ankle one would be the one at risk to bleed. I'll defer to the primary team on if and when anticoagulation dosing is escalated. 3. Agree with IVF. Hypotension was volume responsive. 4. Final read on transthoracic echo pending. 5. Awaiting speciation of blood and urine cultures. I answered all his questions and discussed his case with nursing. He may benefit from a BRITANY to evaluate his possible mitral valve vegetation further. Also, a BRITANY can look at the BELG[place] for thrombus if a cardioversion is planned--ideally he'd be anticoagulated after this. He needs to improve some before doing something like this given the procedural risks, wouldn't rush to do this.         [x]       High complexity decision making was performed in this patient at high risk for decompensation with multiple organ involvementPetr Cordero is a 80 y.o. male with a history of MS. He was admitted with sepsis and found to have bacteremia from both [address] and GNR. On antibiotics. His echo at the bedside today shows normal LV systolic function, mild MR, and a probable ~1 cm vegetation on the mitral valve. His initial ECG on 2/9 shows sinus rhythm, on tele today he's in Afib. He got IV metoprolol and his BP dropped, back up some with IVF. He denies chest pain, syncope, palpitations, MATUTE, palpitations. Past Medical History:   Diagnosis Date    MS (multiple sclerosis) ([address]     Stroke [facility])         Past Surgical History:   Procedure Laterality Date    HX CORONARY STENT PLACEMENT      x7        Social History     Tobacco Use    Smoking status: Never Smoker    Smokeless tobacco: Not on file   Substance Use Topics    Alcohol use: No        No family history on file. Allergies   Allergen Reactions    Beeswax Anaphylaxis    Bee Pollen Unknown (comments)    Simvastatin Not Reported This Time    Tolterodine Not Reported This Time    Venom-Wasp Not Reported This Time          Current Facility-Administered Medications   Medication Dose Route Frequency    sodium hypochlorite (HALF STRENGTH DAKIN'S) 0.25% irrigation (bottle)   Topical BID    metroNIDAZOLE (FLAGYL) IVPB premix 500 mg  500 mg IntraVENous Q12H    metoprolol tartrate (LOPRESSOR) tablet 12.5 mg  12.5 mg Oral Q12H    Vancomycin - please draw level prior to dose on 2/12 at 2100. Contact Pharmacy if you have issues drawing. Thank you!   1 Each Other ONCE    balsam peru-castor oiL (VENELEX) ointment   Topical BID    ferrous sulfate tablet 325 mg  325 mg Oral EVERY OTHER DAY    lidocaine/EPINEPHrine/tetracaine (L.E.T) topical gel  3 mL Topical Q12H    vancomycin (VANCOCIN) 1,000 mg in 0.9% sodium chloride 250 mL (VIAL-MATE)  1,000 mg IntraVENous Q8H    dextrose 10% infusion 125-250 mL  125-250 mL IntraVENous PRN    oxyCODONE IR (ROXICODONE) tablet 5-10 mg  5-10 mg Oral Q4H PRN    sodium chloride (NS) flush 5-10 mL  5-10 mL IntraVENous PRN    cefepime (MAXIPIME) 2 g in 0.9% sodium chloride (MBP/ADV) 100 mL MBP  2 g IntraVENous Q8H    acetaminophen (TYLENOL) tablet 650 mg  650 mg Oral Q6H PRN    ondansetron (ZOFRAN) injection 4 mg  4 mg IntraVENous Q6H PRN    aspirin chewable tablet 81 mg  81 mg Oral DAILY    albuterol-ipratropium (DUO-NEB) 2.5 MG-0.5 MG/3 ML  3 mL Nebulization Q4H PRN    guaiFENesin (ROBITUSSIN) 100 mg/5 mL oral liquid 200 mg  200 mg Oral Q6H PRN    dronabinoL (MARINOL) capsule 2.5 mg  2.5 mg Oral ACB&D    mirtazapine (REMERON) tablet 30 mg  30 mg Oral QHS    insulin lispro (HUMALOG) injection   SubCUTAneous AC&HS    glucose chewable tablet 16 g  4 Tablet Oral PRN    glucagon (GLUCAGEN) injection 1 mg  1 mg IntraMUSCular PRN    pantoprazole (PROTONIX) tablet 40 mg  40 mg Oral ACB    senna-docusate (PERICOLACE) 8.6-50 mg per tablet 2 Tablet  2 Tablet Oral QHS    sertraline (ZOLOFT) tablet 25 mg  25 mg Oral DAILY    glatiramer (COPAXONE) injection 20 mg  20 mg SubCUTAneous Q MON, WED & FRI    0.9% sodium chloride infusion  100 mL/hr IntraVENous CONTINUOUS    L.acidophilus-paracasei-S.thermophil-bifidobacter (RISAQUAD) 8 billion cell capsule  1 Capsule Oral DAILY    enoxaparin (LOVENOX) injection 40 mg  40 mg SubCUTAneous DAILY       Review of Symptoms:    General: +fever, chills, sweats, weakness, weight loss   Eyes: negative for blurred vision, eye pain, loss of vision, diplopia   Ear Nose and Throat: negative for rhinorrhea, pharyngitis, otalgia, tinnitus, speech or swallowing difficulties   Respiratory: + chronic cough, negative for SOB, sputum production, wheezing, MATUTE, pleuritic pain   Cardiology: negative for chest pain, palpitations, orthopnea, PND, edema, syncope   Gastrointestinal: negative for abdominal pain, N/V, dysphagia   Genitourinary: +incontinence   Muskuloskeletal : negative for arthralgia, myalgia Hematology: negative for easy bruising, bleeding, lymphadenopathy   Dermatological: +pressure ulcers, negative for mole change, new lesion   Endocrine: negative for hot flashes or polydipsia   Neurological: negative for headache, dizziness, confusion, focal weakness, paresthesia, memory loss  Psychological: negative for anxiety, depression, agitation       Objective:      Physical Exam:  Temp (24hrs), Av.6 °F (37.6 °C), Min:98 °F (36.7 °C), Max:102.8 °F (39.3 °C)    Patient Vitals for the past 8 hrs:   Pulse   22 1706 89   22 1550 88   22 1414 93   22 1316 (!) 147   22 1122 (!) 130    Patient Vitals for the past 8 hrs:   Resp   22 1550 18   22 1122 18    Patient Vitals for the past 8 hrs:   BP   22 1706 (!) 99/56   22 1558 (!) 72/50   22 1550 (!) 86/51   22 1122 103/62          Intake/Output Summary (Last 24 hours) at 2022 1722  Last data filed at 2022 0231  Gross per 24 hour   Intake    Output 650 ml   Net -650 ml       Nondiaphoretic, not in acute distress, slender male. No scleral icterus, mucous membranes moist, conjuctivae pink, no xanthelasma. Unlabored, clear to auscultation bilaterally anteriorly, symmetric air movement. Irregular rhythm, +soft systolic murmur, no rub. No peripheral edema. Palpable radial pulses bilaterally. Abdomen, soft, nontender, nondistended. Extremities without cyanosis or clubbing. Skin warm and dry. No rashes or ulcers. Neuro grossly nonfocal.  No tremor. Awake and appropriate. CARDIOGRAPHICS and STUDIES, I reviewed:    Telemetry:  Afib. ECG:  Afib. Echo:  Final read pending. CXR:         Labs:  No results for input(s): CPK, CKMB, CKNDX, TROIQ in the last 72 hours.     No lab exists for component: CPKMB  No results found for: CHOL, CHOLX, CHLST, CHOLV, HDL, HDLP, LDL, LDLC, DLDLP, TGLX, TRIGL, TRIGP, CHHD, CHHDX  Recent Labs     02/10/22  0400   INR 1.3*   PTP 13.2*      Recent Labs     02/10/22  0400 02/09/22 1920    136   K 3.6 4.2    100   CO2 27 31   BUN 16 23*   CREA 0.34* 0.39*   GLU 90 110*   PHOS 2.6  --    CA 7.8* 8.3*   ALB 1.9* 2.3*   WBC 6.6 11.8*   HGB 8.3* 10.2*   HCT 27.5* 33.1*    346     Recent Labs     02/10/22  0400 02/09/22 1920   AP 70 90   TP 6.3* 6.9   ALB 1.9* 2.3*   GLOB 4.4* 4.6*     No components found for: GLPOC  No results for input(s): PH, PCO2, PO2 in the last 72 hours.         Miguel Ángel Bean MD  2/11/2022

## 2022-02-11 NOTE — PROGRESS NOTES
Pharmacy Medication Reconciliation     Clarified PTA med list with transfer paperwork. PTA medication list was corrected to the following:     Prior to Admission Medications   Prescriptions Last Dose Informant Taking?   acetaminophen (TYLENOL) 500 mg tablet  Transfer Papers Yes   Sig: Take 1,000 mg by mouth every eight (8) hours as needed (moderate pain). albuterol-ipratropium (DUO-NEB) 2.5 mg-0.5 mg/3 ml nebu  Transfer Papers Yes   Sig: 3 mL by Nebulization route every four (4) hours as needed for Wheezing or Shortness of Breath. ascorbic acid, vitamin C, (VITAMIN C) 500 mg tablet  Transfer Papers Yes   Sig: Take 500 mg by mouth daily. aspirin delayed-release 81 mg tablet  Transfer Papers Yes   Sig: Take 81 mg by mouth daily. bisacodyL (DULCOLAX) 10 mg supp  Transfer Papers Yes   Sig: Insert 10 mg into rectum daily as needed for Constipation. cholecalciferol (VITAMIN D3) (2,000 UNITS /50 MCG) cap capsule  Transfer Papers Yes   Sig: Take 2,000 Units by mouth daily. cyanocobalamin (VITAMIN B12) 100 mcg tablet  Transfer Papers Yes   Sig: Take 200 mcg by mouth daily. docusate sodium (COLACE) 100 mg capsule  Transfer Papers Yes   Sig: Take 100 mg by mouth nightly. dronabinoL (MARINOL) 2.5 mg capsule  Transfer Papers Yes   Sig: Take 2.5 mg by mouth two (2) times a day. ferrous sulfate 325 mg (65 mg iron) tablet  Transfer Papers Yes   Sig: Take 325 mg by mouth every other day. folic acid (FOLVITE) 1 mg tablet  Transfer Papers Yes   Sig: Take 2 mg by mouth daily. glatiramer (COPAXONE) 40 mg/mL syrg  Transfer Papers Yes   Si mg by SubCUTAneous route every Monday, Wednesday, Friday. guaiFENesin (ORGANIDIN) 200 mg tablet  Transfer Papers Yes   Sig: Take 400 mg by mouth every eight (8) hours as needed for Congestion. insulin aspart U-100 (NOVOLOG) 100 unit/mL injection  Transfer Papers Yes   Sig: by SubCUTAneous route Before breakfast, lunch, and dinner.  150-200 = 2 units  201-250 = 4 units  251-300 = 5 units  301-350 = 7 units  351-400 = 9 units  401+ = 10 units   lidocaine-vit e-aloe vera-wayne 2 % topical gel  Transfer Papers Yes   Sig: Apply 1 Each to affected area Every Thursday. Apply to ischiums and right ankle prior to wound care visit   lidocaine-vit e-aloe vera-wayne 2 % topical gel  Transfer Papers Yes   Sig: Apply 1 Each to affected area Every Thursday. Apply to sacrum prior to wound care   mirtazapine (REMERON) 30 mg tablet  Transfer Papers Yes   Sig: Take  by mouth nightly. omeprazole (PRILOSEC) 40 mg capsule  Transfer Papers Yes   Sig: Take 40 mg by mouth daily. oxyCODONE IR (ROXICODONE) 5 mg immediate release tablet  Transfer Papers Yes   Sig: Take 5 mg by mouth every four (4) hours as needed for Pain.   polyethylene glycol (Miralax) 17 gram packet  Transfer Papers Yes   Sig: Take 17 g by mouth daily. senna (SENOKOT) 8.6 mg tablet  Transfer Papers Yes   Sig: Take 2 Tablets by mouth nightly. sertraline (ZOLOFT) 25 mg tablet  Transfer Papers Yes   Sig: Take 25 mg by mouth daily. therapeutic multivitamin [facility]) tablet  Transfer Papers Yes   Sig: Take 1 Tablet by mouth daily. zinc sulfate (Zinc-220) 50 mg zinc (220 mg) capsule  Transfer Papers Yes   Sig: Take 1 Capsule by mouth every other day.       Facility-Administered Medications: None        Thank you,  Prema Garnett, PHARMD

## 2022-02-12 LAB
ANION GAP SERPL CALC-SCNC: 6 MMOL/L (ref 5–15)
BACTERIA SPEC CULT: ABNORMAL
BASOPHILS # BLD: 0 K/UL (ref 0–0.1)
BASOPHILS NFR BLD: 0 % (ref 0–1)
BUN SERPL-MCNC: 10 MG/DL (ref 6–20)
BUN/CREAT SERPL: 31 (ref 12–20)
CALCIUM SERPL-MCNC: 8.2 MG/DL (ref 8.5–10.1)
CC UR VC: ABNORMAL
CHLORIDE SERPL-SCNC: 109 MMOL/L (ref 97–108)
CO2 SERPL-SCNC: 24 MMOL/L (ref 21–32)
CREAT SERPL-MCNC: 0.32 MG/DL (ref 0.7–1.3)
DIFFERENTIAL METHOD BLD: ABNORMAL
EOSINOPHIL # BLD: 0 K/UL (ref 0–0.4)
EOSINOPHIL NFR BLD: 0 % (ref 0–7)
ERYTHROCYTE [DISTWIDTH] IN BLOOD BY AUTOMATED COUNT: 17.4 % (ref 11.5–14.5)
GLUCOSE BLD STRIP.AUTO-MCNC: 110 MG/DL (ref 65–117)
GLUCOSE BLD STRIP.AUTO-MCNC: 117 MG/DL (ref 65–117)
GLUCOSE BLD STRIP.AUTO-MCNC: 118 MG/DL (ref 65–117)
GLUCOSE BLD STRIP.AUTO-MCNC: 120 MG/DL (ref 65–117)
GLUCOSE SERPL-MCNC: 121 MG/DL (ref 65–100)
HCT VFR BLD AUTO: 29 % (ref 36.6–50.3)
HGB BLD-MCNC: 8.5 G/DL (ref 12.1–17)
IMM GRANULOCYTES # BLD AUTO: 0.1 K/UL (ref 0–0.04)
IMM GRANULOCYTES NFR BLD AUTO: 1 % (ref 0–0.5)
LYMPHOCYTES # BLD: 0.8 K/UL (ref 0.8–3.5)
LYMPHOCYTES NFR BLD: 9 % (ref 12–49)
MAGNESIUM SERPL-MCNC: 1.9 MG/DL (ref 1.6–2.4)
MCH RBC QN AUTO: 23.5 PG (ref 26–34)
MCHC RBC AUTO-ENTMCNC: 29.3 G/DL (ref 30–36.5)
MCV RBC AUTO: 80.3 FL (ref 80–99)
MONOCYTES # BLD: 0.7 K/UL (ref 0–1)
MONOCYTES NFR BLD: 8 % (ref 5–13)
NEUTS SEG # BLD: 7 K/UL (ref 1.8–8)
NEUTS SEG NFR BLD: 82 % (ref 32–75)
NRBC # BLD: 0 K/UL (ref 0–0.01)
NRBC BLD-RTO: 0 PER 100 WBC
PHOSPHATE SERPL-MCNC: 2.9 MG/DL (ref 2.6–4.7)
PLATELET # BLD AUTO: 313 K/UL (ref 150–400)
PMV BLD AUTO: 9.8 FL (ref 8.9–12.9)
POTASSIUM SERPL-SCNC: 3.6 MMOL/L (ref 3.5–5.1)
RBC # BLD AUTO: 3.61 M/UL (ref 4.1–5.7)
RBC MORPH BLD: ABNORMAL
RBC MORPH BLD: ABNORMAL
SERVICE CMNT-IMP: ABNORMAL
SERVICE CMNT-IMP: NORMAL
SERVICE CMNT-IMP: NORMAL
SODIUM SERPL-SCNC: 139 MMOL/L (ref 136–145)
WBC # BLD AUTO: 8.6 K/UL (ref 4.1–11.1)

## 2022-02-12 PROCEDURE — 84100 ASSAY OF PHOSPHORUS: CPT

## 2022-02-12 PROCEDURE — 93005 ELECTROCARDIOGRAM TRACING: CPT

## 2022-02-12 PROCEDURE — 74011250637 HC RX REV CODE- 250/637: Performed by: HOSPITALIST

## 2022-02-12 PROCEDURE — 74011000258 HC RX REV CODE- 258: Performed by: EMERGENCY MEDICINE

## 2022-02-12 PROCEDURE — 74011250637 HC RX REV CODE- 250/637: Performed by: INTERNAL MEDICINE

## 2022-02-12 PROCEDURE — 74011250636 HC RX REV CODE- 250/636: Performed by: INTERNAL MEDICINE

## 2022-02-12 PROCEDURE — 74011000250 HC RX REV CODE- 250: Performed by: INTERNAL MEDICINE

## 2022-02-12 PROCEDURE — 74011250636 HC RX REV CODE- 250/636: Performed by: HOSPITALIST

## 2022-02-12 PROCEDURE — 83735 ASSAY OF MAGNESIUM: CPT

## 2022-02-12 PROCEDURE — 87040 BLOOD CULTURE FOR BACTERIA: CPT

## 2022-02-12 PROCEDURE — 77010033678 HC OXYGEN DAILY

## 2022-02-12 PROCEDURE — 74011000258 HC RX REV CODE- 258: Performed by: NURSE PRACTITIONER

## 2022-02-12 PROCEDURE — 65660000000 HC RM CCU STEPDOWN

## 2022-02-12 PROCEDURE — 80048 BASIC METABOLIC PNL TOTAL CA: CPT

## 2022-02-12 PROCEDURE — 74011250637 HC RX REV CODE- 250/637: Performed by: NURSE PRACTITIONER

## 2022-02-12 PROCEDURE — 74011250636 HC RX REV CODE- 250/636: Performed by: NURSE PRACTITIONER

## 2022-02-12 PROCEDURE — 74011250636 HC RX REV CODE- 250/636: Performed by: EMERGENCY MEDICINE

## 2022-02-12 PROCEDURE — 82962 GLUCOSE BLOOD TEST: CPT

## 2022-02-12 PROCEDURE — 85025 COMPLETE CBC W/AUTO DIFF WBC: CPT

## 2022-02-12 PROCEDURE — 36415 COLL VENOUS BLD VENIPUNCTURE: CPT

## 2022-02-12 RX ORDER — MIDODRINE HYDROCHLORIDE 5 MG/1
5 TABLET ORAL
Status: DISCONTINUED | OUTPATIENT
Start: 2022-02-12 | End: 2022-02-18 | Stop reason: HOSPADM

## 2022-02-12 RX ADMIN — METRONIDAZOLE 500 MG: 500 INJECTION, SOLUTION INTRAVENOUS at 08:54

## 2022-02-12 RX ADMIN — SERTRALINE 25 MG: 50 TABLET, FILM COATED ORAL at 08:54

## 2022-02-12 RX ADMIN — ENOXAPARIN SODIUM 40 MG: 100 INJECTION SUBCUTANEOUS at 08:54

## 2022-02-12 RX ADMIN — CEFEPIME HYDROCHLORIDE 2 G: 2 INJECTION, POWDER, FOR SOLUTION INTRAVENOUS at 13:55

## 2022-02-12 RX ADMIN — PANTOPRAZOLE SODIUM 40 MG: 40 TABLET, DELAYED RELEASE ORAL at 08:54

## 2022-02-12 RX ADMIN — Medication 1 CAPSULE: at 08:54

## 2022-02-12 RX ADMIN — HYOSCYAMINE SULFATE: 16 SOLUTION at 09:27

## 2022-02-12 RX ADMIN — DOCUSATE SODIUM 50 MG AND SENNOSIDES 8.6 MG 2 TABLET: 8.6; 5 TABLET, FILM COATED ORAL at 21:19

## 2022-02-12 RX ADMIN — CASTOR OIL AND BALSAM, PERU: 788; 87 OINTMENT TOPICAL at 00:24

## 2022-02-12 RX ADMIN — ASPIRIN 81 MG CHEWABLE TABLET 81 MG: 81 TABLET CHEWABLE at 08:54

## 2022-02-12 RX ADMIN — Medication 3 ML: at 05:44

## 2022-02-12 RX ADMIN — MIDODRINE HYDROCHLORIDE 5 MG: 5 TABLET ORAL at 13:54

## 2022-02-12 RX ADMIN — CASTOR OIL AND BALSAM, PERU: 788; 87 OINTMENT TOPICAL at 21:00

## 2022-02-12 RX ADMIN — DIGOXIN 0.12 MG: 125 TABLET ORAL at 04:06

## 2022-02-12 RX ADMIN — VANCOMYCIN HYDROCHLORIDE 1000 MG: 1 INJECTION, POWDER, LYOPHILIZED, FOR SOLUTION INTRAVENOUS at 14:54

## 2022-02-12 RX ADMIN — METRONIDAZOLE 500 MG: 500 INJECTION, SOLUTION INTRAVENOUS at 21:19

## 2022-02-12 RX ADMIN — Medication 3 ML: at 00:24

## 2022-02-12 RX ADMIN — SODIUM CHLORIDE 125 ML/HR: 9 INJECTION, SOLUTION INTRAVENOUS at 21:28

## 2022-02-12 RX ADMIN — MIDODRINE HYDROCHLORIDE 5 MG: 5 TABLET ORAL at 17:12

## 2022-02-12 RX ADMIN — CEFEPIME HYDROCHLORIDE 2 G: 2 INJECTION, POWDER, FOR SOLUTION INTRAVENOUS at 21:19

## 2022-02-12 RX ADMIN — VANCOMYCIN HYDROCHLORIDE 1000 MG: 1 INJECTION, POWDER, LYOPHILIZED, FOR SOLUTION INTRAVENOUS at 06:35

## 2022-02-12 RX ADMIN — OXYCODONE 5 MG: 5 TABLET ORAL at 12:28

## 2022-02-12 RX ADMIN — DIGOXIN 0.12 MG: 125 TABLET ORAL at 08:54

## 2022-02-12 RX ADMIN — CASTOR OIL AND BALSAM, PERU: 788; 87 OINTMENT TOPICAL at 09:26

## 2022-02-12 RX ADMIN — SODIUM CHLORIDE 125 ML/HR: 9 INJECTION, SOLUTION INTRAVENOUS at 12:33

## 2022-02-12 RX ADMIN — AMIODARONE HYDROCHLORIDE 0.5 MG/MIN: 50 INJECTION, SOLUTION INTRAVENOUS at 13:47

## 2022-02-12 RX ADMIN — SODIUM CHLORIDE 500 ML: 9 INJECTION, SOLUTION INTRAVENOUS at 06:32

## 2022-02-12 RX ADMIN — VANCOMYCIN HYDROCHLORIDE 1000 MG: 1 INJECTION, POWDER, LYOPHILIZED, FOR SOLUTION INTRAVENOUS at 21:19

## 2022-02-12 RX ADMIN — CEFEPIME HYDROCHLORIDE 2 G: 2 INJECTION, POWDER, FOR SOLUTION INTRAVENOUS at 05:10

## 2022-02-12 RX ADMIN — AMIODARONE HYDROCHLORIDE 1 MG/MIN: 50 INJECTION, SOLUTION INTRAVENOUS at 07:46

## 2022-02-12 RX ADMIN — MIRTAZAPINE 30 MG: 15 TABLET, FILM COATED ORAL at 21:19

## 2022-02-12 RX ADMIN — AMIODARONE HYDROCHLORIDE 150 MG: 1.5 INJECTION, SOLUTION INTRAVENOUS at 07:05

## 2022-02-12 RX ADMIN — HYOSCYAMINE SULFATE: 16 SOLUTION at 00:28

## 2022-02-12 RX ADMIN — DRONABINOL 2.5 MG: 2.5 CAPSULE ORAL at 17:12

## 2022-02-12 NOTE — PROGRESS NOTES
Problem: Pressure Injury - Risk of  Goal: *Prevention of pressure injury  Description: Document Lewis Scale and appropriate interventions in the flowsheet. Outcome: Progressing Towards Goal  Note: Pressure Injury Interventions:  Sensory Interventions: Assess changes in LOC,Check visual cues for pain,Float heels,Keep linens dry and wrinkle-free,Minimize linen layers,Maintain/enhance activity level,Turn and reposition approx. every two hours (pillows and wedges if needed),Monitor skin under medical devices    Moisture Interventions: Absorbent underpads,Apply protective barrier, creams and emollients,Check for incontinence Q2 hours and as needed,Internal/External urinary devices,Minimize layers    Activity Interventions: Pressure redistribution bed/mattress(bed type),PT/OT evaluation    Mobility Interventions: Float heels,HOB 30 degrees or less,Pressure redistribution bed/mattress (bed type),Trapeze to reposition,Turn and reposition approx. every two hours(pillow and wedges),PT/OT evaluation    Nutrition Interventions: Document food/fluid/supplement intake,Offer support with meals,snacks and hydration    Friction and Shear Interventions: Apply protective barrier, creams and emollients,Feet elevated on foot rest,Foam dressings/transparent film/skin sealants,HOB 30 degrees or less,Lift team/patient mobility team,Minimize layers,Transferring/repositioning devices,Sit at 90-degree angle                Problem: Patient Education: Go to Patient Education Activity  Goal: Patient/Family Education  Outcome: Progressing Towards Goal     Problem: Falls - Risk of  Goal: *Absence of Falls  Description: Document Marivelmonty Sparks Fall Risk and appropriate interventions in the flowsheet.   Outcome: Progressing Towards Goal  Note: Fall Risk Interventions:  Mobility Interventions: Bed/chair exit alarm,Communicate number of staff needed for ambulation/transfer,Patient to call before getting OOB,PT Consult for mobility concerns,PT Consult for assist device competence,Strengthening exercises (ROM-active/passive)    Mentation Interventions: Adequate sleep, hydration, pain control,Bed/chair exit alarm,Door open when patient unattended,More frequent rounding,Reorient patient,Toileting rounds,Update white board    Medication Interventions: Patient to call before getting OOB,Teach patient to arise slowly    Elimination Interventions: Call light in reach,Patient to call for help with toileting needs,Stay With Me (per policy),Toileting schedule/hourly rounds              Problem: Patient Education: Go to Patient Education Activity  Goal: Patient/Family Education  Outcome: Progressing Towards Goal     Problem: General Wound Care  Goal: *Infected Wound: Prevention of further infection and promotion of healing  Description: Infection control procedures (eg: clean dressings, clean gloves, hand washing, precautions to isolate wound from contamination, sterile instruments used for wound debridement) should be implemented.   Outcome: Progressing Towards Goal     Problem: Patient Education: Go to Patient Education Activity  Goal: Patient/Family Education  Outcome: Progressing Towards Goal

## 2022-02-12 NOTE — PROGRESS NOTES
Received message from patient's nurse stating:    Patient's BP is 82/53. This is has been a problem all day. Bolus was given earlier and fluids increased to 125 mL/hour. Discussion / orders:    Midodrine 5 mg p.o. x1  Holding metoprolol for now         Please note that this note was dictated using Dragon computer voice recognition software. Quite often unanticipated grammatical, syntax, homophones, and other interpretive errors are inadvertently transcribed by the computer software. Please disregard these errors. Please excuse any errors that have escaped final proofreading.      Signed by:  Jojo Medrano, CARI, ACNP-BC

## 2022-02-12 NOTE — PROGRESS NOTES
CARDIOLOGY Progress Note    Patient ID:  Patient: Milvia Cordero  MRN: [id number]  Age: 80 y.o.  : 1950    Date of  Admission: 2022  6:34 PM   PCP:  Mercedes Wu MD    Assessment: 1. Atrial fibrillation with RVR, paroxysmal.  Back in sinus. 2. CAD with remote coronary stenting. No angina. 3. Normal LV systolic function on echo. 4. 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 5. Sepsis, improving. Multiple sites for bacterial entry. 6. Multiple sclerosis with severe debility (functional quadriplegic). 7. Remote stroke in .  8. Full code. Plan:     1. Continue digoxin for rate control. 2. Didn't initially have BP to tolerate metoprolol. OK with midodrine. 3. Amiodarone infusion started last night, if he remains in sinus tomorrow will change him to oral (and avoid the ~25% PIV infiltration and phlebitis). 4. On DVT prophylaxis, not a good chronic full anticoagulation candidate due to bleeding risk, anemia, etc.  Discussed the status of his wounds and bleeding risk from these with wound care, the ankle one would be the one at risk to bleed. I'll defer to the primary team on if and when anticoagulation dosing is escalated. 5. Final read on transthoracic echo pending. 6. Awaiting speciation of blood and urine cultures. I answered all his and his brother's questions, discussed his case with nursing. Since he's back in sinus, no BRITANY-cardioversion needed. Will see if he still needs a BRITANY to evaluate the mitral valve later in his admission. [x]       High complexity decision making was performed in this patient at high risk for decompensation with multiple organ involvement. Milvia Cordero is a 80 y.o. male with a history of MS. He was admitted with sepsis and found to have bacteremia from both  [address] [place] and GNR. On antibiotics.   His echo at the bedside today shows normal LV systolic function, mild MR, and a probable ~1 cm vegetation on the mitral valve. His initial ECG on 2/9 shows sinus rhythm, on tele today he's in Afib. He got IV metoprolol and his BP dropped, back up some with IVF. He denies chest pain, syncope, palpitations, MATUTE, palpitations. Past Medical History:   Diagnosis Date    MS (multiple sclerosis) ([facility] [address]     Stroke [facility])         Past Surgical History:   Procedure Laterality Date    HX CORONARY STENT PLACEMENT      x7        Social History     Tobacco Use    Smoking status: Never Smoker    Smokeless tobacco: Not on file   Substance Use Topics    Alcohol use: No        No family history on file.      Allergies   Allergen Reactions    Beeswax Anaphylaxis    Bee Pollen Unknown (comments)    Simvastatin Not Reported This Time    Tolterodine Not Reported This Time    Venom-Wasp Not Reported This Time          Current Facility-Administered Medications   Medication Dose Route Frequency    amiodarone (CORDARONE) 375 mg/250 mL D5W infusion  0.5 mg/min IntraVENous CONTINUOUS    [START ON 2/13/2022] Vancomycin Trough Draw Reminder Note  1 Each Other ONCE    midodrine (PROAMATINE) tablet 5 mg  5 mg Oral TID WITH MEALS    sodium hypochlorite (HALF STRENGTH DAKIN'S) 0.25% irrigation (bottle)   Topical BID    metroNIDAZOLE (FLAGYL) IVPB premix 500 mg  500 mg IntraVENous Q12H    [Held by provider] metoprolol tartrate (LOPRESSOR) tablet 12.5 mg  12.5 mg Oral Q12H    balsam peru-castor oiL (VENELEX) ointment   Topical BID    ferrous sulfate tablet 325 mg  325 mg Oral EVERY OTHER DAY    lidocaine/EPINEPHrine/tetracaine (L.E.T) topical gel  3 mL Topical Q12H    digoxin (LANOXIN) tablet 0.125 mg  0.125 mg Oral DAILY    vancomycin (VANCOCIN) 1,000 mg in 0.9% sodium chloride 250 mL (VIAL-MATE)  1,000 mg IntraVENous Q8H    dextrose 10% infusion 125-250 mL  125-250 mL IntraVENous PRN    oxyCODONE IR (ROXICODONE) tablet 5-10 mg  5-10 mg Oral Q4H PRN    sodium chloride (NS) flush 5-10 mL  5-10 mL IntraVENous PRN    cefepime (MAXIPIME) 2 g in 0.9% sodium chloride (MBP/ADV) 100 mL MBP  2 g IntraVENous Q8H    acetaminophen (TYLENOL) tablet 650 mg  650 mg Oral Q6H PRN    ondansetron (ZOFRAN) injection 4 mg  4 mg IntraVENous Q6H PRN    aspirin chewable tablet 81 mg  81 mg Oral DAILY    albuterol-ipratropium (DUO-NEB) 2.5 MG-0.5 MG/3 ML  3 mL Nebulization Q4H PRN    guaiFENesin (ROBITUSSIN) 100 mg/5 mL oral liquid 200 mg  200 mg Oral Q6H PRN    dronabinoL (MARINOL) capsule 2.5 mg  2.5 mg Oral ACB&D    mirtazapine (REMERON) tablet 30 mg  30 mg Oral QHS    insulin lispro (HUMALOG) injection   SubCUTAneous AC&HS    glucose chewable tablet 16 g  4 Tablet Oral PRN    glucagon (GLUCAGEN) injection 1 mg  1 mg IntraMUSCular PRN    pantoprazole (PROTONIX) tablet 40 mg  40 mg Oral ACB    senna-docusate (PERICOLACE) 8.6-50 mg per tablet 2 Tablet  2 Tablet Oral QHS    sertraline (ZOLOFT) tablet 25 mg  25 mg Oral DAILY    glatiramer (COPAXONE) injection 20 mg  20 mg SubCUTAneous Q MON, WED & FRI    0.9% sodium chloride infusion  125 mL/hr IntraVENous CONTINUOUS    L.acidophilus-paracasei-S.thermophil-bifidobacter (RISAQUAD) 8 billion cell capsule  1 Capsule Oral DAILY    enoxaparin (LOVENOX) injection 40 mg  40 mg SubCUTAneous DAILY       Review of Symptoms:    General: +fever, chills, sweats, weakness, weight loss   Eyes: negative for blurred vision, eye pain, loss of vision, diplopia   Ear Nose and Throat: negative for rhinorrhea, pharyngitis, otalgia, tinnitus, speech or swallowing difficulties   Respiratory: + chronic cough, negative for SOB, sputum production, wheezing, MATUTE, pleuritic pain   Cardiology: negative for chest pain, palpitations, orthopnea, PND, edema, syncope   Gastrointestinal: negative for abdominal pain, N/V, dysphagia   Genitourinary: +incontinence   Muskuloskeletal : negative for arthralgia, myalgia Hematology: negative for easy bruising, bleeding, lymphadenopathy   Dermatological: +pressure ulcers, negative for mole change, new lesion   Endocrine: negative for hot flashes or polydipsia   Neurological: negative for headache, dizziness, confusion, focal weakness, paresthesia, memory loss  Psychological: negative for anxiety, depression, agitation       Objective:      Physical Exam:  Temp (24hrs), Av.3 °F (36.8 °C), Min:98 °F (36.7 °C), Max:99.1 °F (37.3 °C)    Patient Vitals for the past 8 hrs:   Pulse   22 1712 78   22 1500 78   22 1115 99    Patient Vitals for the past 8 hrs:   Resp   22 1500 17   22 1115 25    Patient Vitals for the past 8 hrs:   BP   22 1712 111/69   22 1500 105/66   22 1115 100/63          Intake/Output Summary (Last 24 hours) at 2022 1812  Last data filed at 2022 1513  Gross per 24 hour   Intake 2213.66 ml   Output 1275 ml   Net 938.66 ml       Nondiaphoretic, not in acute distress, slender male. No scleral icterus, mucous membranes moist, conjuctivae pink, no xanthelasma. Unlabored, clear to auscultation bilaterally anteriorly, symmetric air movement. Irregular rhythm, +soft systolic murmur, no rub. No peripheral edema. Palpable radial pulses bilaterally. Abdomen, soft, nontender, nondistended. Extremities without cyanosis or clubbing. Skin warm and dry. No rashes or ulcers. Neuro grossly nonfocal.  No tremor. Awake and appropriate. CARDIOGRAPHICS and STUDIES, I reviewed:    Telemetry:  Afib. ECG:  Afib. Echo:  Final read pending. CXR:         Labs:  No results for input(s): CPK, CKMB, CKNDX, TROIQ in the last 72 hours.     No lab exists for component: CPKMB  No results found for: CHOL, CHOLX, CHLST, CHOLV, HDL, HDLP, LDL, LDLC, DLDLP, TGLX, TRIGL, TRIGP, CHHD, CHHDX  Recent Labs     02/10/22  0400   INR 1.3*   PTP 13.2*      Recent Labs     22  0342 02/10/22  0400 22  1920    138 136 K 3.6 3.6 4.2   * 106 100   CO2 24 27 31   BUN 10 16 23*   CREA 0.32* 0.34* 0.39*   * 90 110*   PHOS 2.9 2.6  --    CA 8.2* 7.8* 8.3*   ALB  --  1.9* 2.3*   WBC 8.6 6.6 11.8*   HGB 8.5* 8.3* 10.2*   HCT 29.0* 27.5* 33.1*    302 346     Recent Labs     02/10/22  0400 02/09/22 1920   AP 70 90   TP 6.3* 6.9   ALB 1.9* 2.3*   GLOB 4.4* 4.6*     No components found for: GLPOC  No results for input(s): PH, PCO2, PO2 in the last 72 hours.         Dina Slaughter MD  2/12/2022

## 2022-02-12 NOTE — PROGRESS NOTES
02/12/22 0620   Vitals   Temp 98.3 °F (36.8 °C)   Temp Source Oral   Pulse (Heart Rate) (!) 148   Heart Rate Source Monitor   Resp Rate (!) 35   O2 Sat (%) 96 %   Level of Consciousness Alert (0)   BP (!) 88/70   MAP (Calculated) 76   BP 1 Location Right upper arm   BP 1 Method Automatic   BP Patient Position At rest   Cardiac Rhythm Atrial Fib   MEWS Score 7   Alarms Set and Audible Cardiac alarms;Pulse ox alarms; Respiratory alarms   Box Number hardwire   Electrodes Replaced No   Pain 1   Pain Scale 1 Numeric (0 - 10)   Pain Intensity 1 0   Patient Stated Pain Goal 0   POSS Scale   Opioid Sedation Scale 1   Oxygen Therapy   Pulse via Oximetry (!) 33 beats per minute     TRANSFER - IN REPORT:    Verbal report received from Rocío MIXON(name) on [facility]  being received from Ortho(unit) for change in patient condition(hypotension, afib rvr)      Report consisted of patients Situation, Background, Assessment and   Recommendations(SBAR). Information from the following report(s) SBAR, Kardex, ED Summary, Intake/Output, MAR, Recent Results and Cardiac Rhythm Afib was reviewed with the receiving nurse. Opportunity for questions and clarification was provided. Assessment completed upon patients arrival to unit and care assumed. 2626: Arrived from ortho. NP Purveyor at bedside due to high MEWS score, hypotensive and Afib RVR. Purveyor ordered Bolus 500cc normal saline. Will keep monitoring heart rate. Patient is not in distress and asymptomatic.     0645: Perfect Serve NP Purveyor regarding patient's HR 120s-150s while resting in bed. Awaiting orders. 4479: Amio bolus started. End of Shift Note    Bedside shift change report given to [address] (oncoming nurse) by Lucía Deluca RN (offgoing nurse).   Report included the following information SBAR, Kardex, ED Summary, Intake/Output, MAR, Recent Results and Cardiac Rhythm Afb    Shift worked:  3490-4940     Shift summary and any significant changes:    Afib RVR, amio gtt started. Bolus patient. Concerns for physician to address:  cardiology consult     Zone phone for oncoming shift:          Activity:  Activity Level: Bed Rest  Number times ambulated in hallways past shift: 0  Number of times OOB to chair past shift: 0    Cardiac:   Cardiac Monitoring: Yes      Cardiac Rhythm: Atrial Fib    Access:   Current line(s): PIV     Genitourinary:   Urinary status: mauro    Respiratory:   O2 Device: Nasal cannula  Chronic home O2 use?: NO  Incentive spirometer at bedside: NO     GI:  Last Bowel Movement Date: 02/11/22  Current diet:  ADULT DIET Regular  ADULT ORAL NUTRITION SUPPLEMENT Breakfast, Dinner; Renal Supplement  Passing flatus: YES  Tolerating current diet: YES       Pain Management:   Patient states pain is manageable on current regimen: YES    Skin:  Lewis Score: 12  Interventions: turn team, speciality bed, float heels, increase time out of bed, foam dressing, PT/OT consult and internal/external urinary devices    Patient Safety:  Fall Score:  Total Score: 3  Interventions: bed/chair alarm, assistive device (walker, cane, etc), gripper socks, pt to call before getting OOB and stay with me (per policy)  High Fall Risk: Yes    Length of Stay:  Expected LOS: - - -  Actual LOS: [address] Rachel Low RN

## 2022-02-12 NOTE — PROGRESS NOTES
Message sent to [facility], NP via [employer] \"Patients HR is now sustaining in the 130s-140s. \"

## 2022-02-12 NOTE — PROGRESS NOTES
Message sent to [employer], NP via [employer] \"Patient's BP is 82/53. This is has been a problem all day. Bolus was given earlier and fluids increased to 125 mL/hour. Please advise of further instructions. \"

## 2022-02-12 NOTE — PROGRESS NOTES
Pharmacy Antimicrobial Kinetic Dosing    Indication for Antimicrobials: Possible Bacteremia + Chronic osteomyelitis involving the sacrococcygeal junction with overlying SSTI    Current Regimen of Each Antimicrobial:  Vancomycin  1750 mg IV x1 then pharmacy to dose (Start Date  ; Day # 3)  Cefepime 2 gm IV q 8h    (Start Date  ; Day # 3)  Metronidazole 500mg IV q12h (start ; day )    Previous Antimicrobial Therapy:  Levaquin 750 mg IV q 24h  (Start Date 2/10 ; Day # 1)    Goal Level: AUC: 400-600 mg/hr/Liter/day    Date Dose & Interval Measured (mcg/mL) Predicted AUC/WILLIE   22 1000 mg IV q 8h 12.7  471                      Date & time of next level: check level with 0600 dose     Dosing calculator used: Mysterio calculator    Significant Positive Cultures:   22  Blood = 1/2 gnr, 1/2 beta strep(pending)  22   Urine = >100k gnr (pending)  2/10 sacral wound - gnr (pending)   pbcx -     Conditions for Dosing Consideration: MS, past Stroke, low creatinine -bedbound    Labs:  Recent Labs     02/10/22  0400 22  1920   CREA 0.34* 0.39*   BUN 16 23*   PCT 4.26  --      Recent Labs     02/10/22  0400 22  1920   WBC 6.6 11.8*     Temp (24hrs), Av.5 °F (36.9 °C), Min:98 °F (36.7 °C), Max:100.1 °F (37.8 °C)        Creatinine Clearance (mL/min):   CrCl (Ideal Body Weight): 111.0   If actual weight < IBW: CrCl (Actual Body Weight) 97.0    Impression/Plan:   · Continue Cefepime as ordered  · Continue Metronidazole as ordered  · Current trough level of 12.7 mcg/ml, AUC of 471 is within desired limits  · Continue with Vancomycin 1000 mg IV  q8h  · Daily BMP per Vancomycin dosing protocol   · Antimicrobial stop date: To be determined     Pharmacy will follow daily and adjust medications as appropriate for renal function and/or serum levels.     Thank you,  Danie Sawyer, [facility]

## 2022-02-12 NOTE — PROGRESS NOTES
Hospitalist Progress Note    NAME: Thaddeus Hawk   :  1950   MRN:  [id number]       Assessment / Plan:  Sepsis (tachycardia and fever) due to infected sacral and left ischium pressure ulcers with history of chronic osteomyelitis  CT scan showed 1. Deep soft tissue ulcerations extending to the bilateral ischial tuberosities  with associated acute osteomyelitis. 2.  Chronic osteomyelitis involving the sacrococcygeal junction with overlying  soft tissue ulceration and abscess. 3.  Bilateral femoral head avascular necrosis with subtle subchondral collapse  + Leukocytosis, fever   Blood cultures +ve for group B strep, GNR? No longer see results, was this wound culture? .  ID following, Check ECHO. c/w Vanco, cefepime, change levaquin to flagyl  Repeat blood cultures until negative  Wound cultures +ve for probable proteus and possible strep  IVF  General surgery consulted, plan for debridement  Wound care consulted  Patient has been hypotensive, responds to fluids.   Add midodrine    Atrial Fibrillation  Cardio consulted  Placed on amio drip  Also given digoxin  No AC indicated right now given history of hematuria and planned I&D     Possible UTI in settings of indwelling mauro  Current antibiotics will cover  Change Mauro per hospital protocol  urine culture +ve for GNR, f/u S&S No longer resulted either, will call lab     MS with severe debility  bed bound   Continue Copaxone injections     Dysphagia  On regular diet at the nursing home per family request  Multiple MBS done in the past but showing some level of dysphagia  Family and patient is not interested in in speech eval or another MBS  Patient and family is aware of risk of aspiration and accepting it fully  HOB at 45 degree of the time  Aspiration precautions  Supervised feeding  Appetite stimulant     Chronic cough, frequent coughing spells  Neurogenic bladder with indwelling Mauro  CAD: Continue aspirin  Moderate protein calorie malnutrition: Dietary consult  Adult failure to thrive: Continue Remeron and Marinol  Functional quadriplegia  Depression: Continue Zoloft and Remeron  Hx of PE: Anticoagulation was discontinued due to recurrent hematuria          Code Status: full code d/w pt and niece at bedside   Surrogate Decision Maker: AUDREY Mcguire ( ortho [place] [id number] [facility]) [phone]  and brother Zoya Mason [phone]     DVT Prophylaxis: lovenox   GI Prophylaxis: not indicated     Baseline: Nursing home resident, bedbound, no children       18.5 - 24.9 Normal weight / Body mass index is 20.32 kg/m². Estimated discharge date: February 14  Barriers: Wound care, bacteremia, infection    Code status: Full  Prophylaxis: Lovenox  Recommended Disposition: SNF/LTC     Subjective:     Chief Complaint / Reason for Physician Visit  Patient seen and examined at the bedside. He states he feels about the same as he usually does. He denies any lightheadedness or dizziness. He does have insight and understands his hospital course today. He denies any palpitations or chest pain. He is able to comprehend his plan of care. Discussed with RN events overnight. Patient had a fever at midnight    Review of Systems:  Symptom Y/N Comments  Symptom Y/N Comments   Fever/Chills    Chest Pain     Poor Appetite    Edema     Cough    Abdominal Pain     Sputum    Joint Pain     SOB/AMTUTE    Pruritis/Rash     Nausea/vomit    Tolerating PT/OT     Diarrhea    Tolerating Diet     Constipation    Other       Could NOT obtain due to:      Objective:     VITALS:   Last 24hrs VS reviewed since prior progress note.  Most recent are:  Patient Vitals for the past 24 hrs:   Temp Pulse Resp BP SpO2   02/12/22 1115 98.6 °F (37 °C) 99 25 100/63 97 %   02/12/22 0854  (!) 112  (!) 103/55 95 %   02/12/22 0748 98.2 °F (36.8 °C) (!) 115 29 92/62 95 %   02/12/22 0640  (!) 136 27 101/75 96 %   02/12/22 0620 98.3 °F (36.8 °C) (!) 148 (!) 35 (!) 88/70 96 %   02/12/22 0345 98.2 °F (36.8 °C) (!) 141 20 (!) 106/55 94 %   02/12/22 0336  (!) 144      02/12/22 0148  (!) 148      02/11/22 2259 98 °F (36.7 °C) 90 18 (!) 100/58 97 %   02/11/22 2149  90  (!) 96/58    02/11/22 2039    (!) 82/53    02/11/22 2007 98 °F (36.7 °C) 76 16 (!) 87/51 93 %   02/11/22 1936    92/61    02/11/22 1911  (!) 131      02/11/22 1845    92/61    02/11/22 1836    (!) 83/55    02/11/22 1706  89  (!) 99/56    02/11/22 1558    (!) 72/50    02/11/22 1550 98 °F (36.7 °C) 88 18 (!) 86/51 97 %   02/11/22 1414  93          Intake/Output Summary (Last 24 hours) at 2/12/2022 1342  Last data filed at 2/12/2022 1128  Gross per 24 hour   Intake 1050 ml   Output 1275 ml   Net -225 ml        I had a face to face encounter and independently examined this patient on 2/12/2022, as outlined below:  PHYSICAL EXAM:  General: WD, WN. Alert, cooperative, no acute distress , Significantly debilitated, frail, temporal wasting  EENT:  EOMI. Anicteric sclerae. MMM  Resp:  CTA bilaterally, no wheezing or rales. No accessory muscle use  CV:  Regular  rhythm,  No edema  GI:  Soft, Non distended, Non tender. +Bowel sounds  Neurologic:  Alert and oriented X 3, normal speech,   Psych:   Good insight. Not anxious nor agitated  Skin:  No rashes. No jaundice, see wound care for ulcers    Reviewed most current lab test results and cultures  YES  Reviewed most current radiology test results   YES  Review and summation of old records today    NO  Reviewed patient's current orders and MAR    YES  PMH/SH reviewed - no change compared to H&P  ________________________________________________________________________  Care Plan discussed with:    Comments   Patient     Family      RN     Care Manager     Consultant                        Multidiciplinary team rounds were held today with , nursing, pharmacist and clinical coordinator.   Patient's plan of care was discussed; medications were reviewed and discharge planning was addressed. ________________________________________________________________________  Total NON critical care TIME:  33   Minutes    Total CRITICAL CARE TIME Spent:   Minutes non procedure based      Comments   >50% of visit spent in counseling and coordination of care     ________________________________________________________________________  Valentín Renteria MD     Procedures: see electronic medical records for all procedures/Xrays and details which were not copied into this note but were reviewed prior to creation of Plan. LABS:  I reviewed today's most current labs and imaging studies.   Pertinent labs include:  Recent Labs     02/12/22  0342 02/10/22  0400 02/09/22  1920   WBC 8.6 6.6 11.8*   HGB 8.5* 8.3* 10.2*   HCT 29.0* 27.5* 33.1*    302 346     Recent Labs     02/12/22  0342 02/10/22  0400 02/09/22  1920    138 136   K 3.6 3.6 4.2   * 106 100   CO2 24 27 31   * 90 110*   BUN 10 16 23*   CREA 0.32* 0.34* 0.39*   CA 8.2* 7.8* 8.3*   MG 1.9 1.9  --    PHOS 2.9 2.6  --    ALB  --  1.9* 2.3*   TBILI  --  0.3 0.4   ALT  --  16 15   INR  --  1.3*  --        Signed: Valentín Renteria MD

## 2022-02-12 NOTE — PROGRESS NOTES
Problem: Pressure Injury - Risk of  Goal: *Prevention of pressure injury  Description: Document Lewis Scale and appropriate interventions in the flowsheet. Outcome: Progressing Towards Goal  Note: Pressure Injury Interventions:  Sensory Interventions: Assess changes in LOC,Check visual cues for pain,Float heels,Keep linens dry and wrinkle-free,Minimize linen layers,Maintain/enhance activity level,Turn and reposition approx. every two hours (pillows and wedges if needed),Monitor skin under medical devices    Moisture Interventions: Absorbent underpads,Apply protective barrier, creams and emollients,Check for incontinence Q2 hours and as needed,Internal/External urinary devices,Minimize layers    Activity Interventions: Pressure redistribution bed/mattress(bed type),PT/OT evaluation    Mobility Interventions: Float heels,HOB 30 degrees or less,Pressure redistribution bed/mattress (bed type),Trapeze to reposition,Turn and reposition approx. every two hours(pillow and wedges),PT/OT evaluation    Nutrition Interventions: Document food/fluid/supplement intake,Offer support with meals,snacks and hydration    Friction and Shear Interventions: Apply protective barrier, creams and emollients,Feet elevated on foot rest,Foam dressings/transparent film/skin sealants,HOB 30 degrees or less,Lift team/patient mobility team,Minimize layers,Transferring/repositioning devices,Sit at 90-degree angle                Problem: Patient Education: Go to Patient Education Activity  Goal: Patient/Family Education  Outcome: Progressing Towards Goal     Problem: Falls - Risk of  Goal: *Absence of Falls  Description: Document Megan Litter Fall Risk and appropriate interventions in the flowsheet.   Outcome: Progressing Towards Goal  Note: Fall Risk Interventions:  Mobility Interventions: Bed/chair exit alarm,Communicate number of staff needed for ambulation/transfer,Patient to call before getting OOB,PT Consult for mobility concerns,PT Consult for assist device competence,Strengthening exercises (ROM-active/passive)    Mentation Interventions: Adequate sleep, hydration, pain control,Bed/chair exit alarm,Door open when patient unattended,More frequent rounding,Reorient patient,Toileting rounds,Update white board    Medication Interventions: Patient to call before getting OOB,Teach patient to arise slowly    Elimination Interventions: Call light in reach,Patient to call for help with toileting needs,Stay With Me (per policy),Toileting schedule/hourly rounds              Problem: Patient Education: Go to Patient Education Activity  Goal: Patient/Family Education  Outcome: Progressing Towards Goal     Problem: General Wound Care  Goal: *Infected Wound: Prevention of further infection and promotion of healing  Description: Infection control procedures (eg: clean dressings, clean gloves, hand washing, precautions to isolate wound from contamination, sterile instruments used for wound debridement) should be implemented. Outcome: Progressing Towards Goal  Goal: Interventions  Outcome: Progressing Towards Goal     Problem: Diabetes Self-Management  Goal: *Disease process and treatment process  Description: Define diabetes and identify own type of diabetes; list 3 options for treating diabetes. Outcome: Progressing Towards Goal  Goal: *Incorporating nutritional management into lifestyle  Description: Describe effect of type, amount and timing of food on blood glucose; list 3 methods for planning meals. Outcome: Progressing Towards Goal  Goal: *Incorporating physical activity into lifestyle  Description: State effect of exercise on blood glucose levels. Outcome: Progressing Towards Goal  Goal: *Developing strategies to promote health/change behavior  Description: Define the ABC's of diabetes; identify appropriate screenings, schedule and personal plan for screenings.   Outcome: Progressing Towards Goal  Goal: *Using medications safely  Description: State effect of diabetes medications on diabetes; name diabetes medication taking, action and side effects. Outcome: Progressing Towards Goal  Goal: *Monitoring blood glucose, interpreting and using results  Description: Identify recommended blood glucose targets  and personal targets. Outcome: Progressing Towards Goal  Goal: *Prevention, detection, treatment of acute complications  Description: List symptoms of hyper- and hypoglycemia; describe how to treat low blood sugar and actions for lowering  high blood glucose level. Outcome: Progressing Towards Goal  Goal: *Prevention, detection and treatment of chronic complications  Description: Define the natural course of diabetes and describe the relationship of blood glucose levels to long term complications of diabetes.   Outcome: Progressing Towards Goal  Goal: *Developing strategies to address psychosocial issues  Description: Describe feelings about living with diabetes; identify support needed and support network  Outcome: Progressing Towards Goal  Goal: *Insulin pump training  Outcome: Progressing Towards Goal  Goal: *Sick day guidelines  Outcome: Progressing Towards Goal  Goal: *Patient Specific Goal (EDIT GOAL, INSERT TEXT)  Outcome: Progressing Towards Goal     Problem: Patient Education: Go to Patient Education Activity  Goal: Patient/Family Education  Outcome: Progressing Towards Goal     Problem: Patient Education: Go to Patient Education Activity  Goal: Patient/Family Education  Outcome: Progressing Towards Goal     Problem: Afib Pathway: Day 1  Goal: Off Pathway (Use only if patient is Off Pathway)  Outcome: Progressing Towards Goal  Goal: Activity/Safety  Outcome: Progressing Towards Goal  Goal: Consults, if ordered  Outcome: Progressing Towards Goal  Goal: Diagnostic Test/Procedures  Outcome: Progressing Towards Goal  Goal: Nutrition/Diet  Outcome: Progressing Towards Goal  Goal: Discharge Planning  Outcome: Progressing Towards Goal  Goal: Medications  Outcome: Progressing Towards Goal  Goal: Respiratory  Outcome: Progressing Towards Goal  Goal: Treatments/Interventions/Procedures  Outcome: Progressing Towards Goal  Goal: Psychosocial  Outcome: Progressing Towards Goal  Goal: *Optimal pain control at patient's stated goal  Outcome: Progressing Towards Goal  Goal: *Hemodynamically stable  Outcome: Progressing Towards Goal  Goal: *Stable cardiac rhythm  Outcome: Progressing Towards Goal  Goal: *Lungs clear or at baseline  Outcome: Progressing Towards Goal  Goal: *Labs within defined limits  Outcome: Progressing Towards Goal  Goal: *Describes available resources and support systems  Outcome: Progressing Towards Goal

## 2022-02-12 NOTE — PROGRESS NOTES
02/12/22 1115   Vitals   Temp 98.6 °F (37 °C)   Temp Source Oral   Pulse (Heart Rate) 99   Heart Rate Source Monitor   Resp Rate 25   O2 Sat (%) 97 %   Level of Consciousness Alert (0)   /63   MAP (Monitor) 75   MAP (Calculated) 75   Cardiac Rhythm Sinus Rhythm;Atrial Fib   MEWS Score 3   Box Number Hardwired   Electrodes Replaced No   Patient Observation   Patient Turned Turned on Right Side   Oxygen Therapy   Pulse via Oximetry 109 beats per minute   O2 Device Nasal cannula   Skin Assessment Clean, dry, & intact   Skin Protection for O2 Device N/A   O2 Flow Rate (L/min) 2 l/min   Pain 1   Pain Scale 1 Numeric (0 - 10)   Pain Intensity 1 0   Patient Stated Pain Goal 0   Pain Reassessment 1 Yes     MEWS 3 due to respiratory rate.

## 2022-02-12 NOTE — PROGRESS NOTES
Problem: Pressure Injury - Risk of  Goal: *Prevention of pressure injury  Description: Document Lewis Scale and appropriate interventions in the flowsheet. Outcome: Progressing Towards Goal  Note: Pressure Injury Interventions:  Sensory Interventions: Assess changes in LOC    Moisture Interventions: Assess need for specialty bed    Activity Interventions: Assess need for specialty bed    Mobility Interventions: Assess need for specialty bed    Nutrition Interventions: Document food/fluid/supplement intake    Friction and Shear Interventions: Minimize layers                Problem: Patient Education: Go to Patient Education Activity  Goal: Patient/Family Education  Outcome: Progressing Towards Goal     Problem: Falls - Risk of  Goal: *Absence of Falls  Description: Document Bennye Abo Fall Risk and appropriate interventions in the flowsheet. Outcome: Progressing Towards Goal  Note: Fall Risk Interventions:  Mobility Interventions: Communicate number of staff needed for ambulation/transfer    Mentation Interventions: More frequent rounding    Medication Interventions: Patient to call before getting OOB    Elimination Interventions: Call light in reach              Problem: Patient Education: Go to Patient Education Activity  Goal: Patient/Family Education  Outcome: Progressing Towards Goal     Problem: General Wound Care  Goal: *Infected Wound: Prevention of further infection and promotion of healing  Description: Infection control procedures (eg: clean dressings, clean gloves, hand washing, precautions to isolate wound from contamination, sterile instruments used for wound debridement) should be implemented. Outcome: Progressing Towards Goal  Goal: Interventions  Outcome: Progressing Towards Goal     Problem: Diabetes Self-Management  Goal: *Disease process and treatment process  Description: Define diabetes and identify own type of diabetes; list 3 options for treating diabetes.   Outcome: Progressing Towards Goal  Goal: *Incorporating nutritional management into lifestyle  Description: Describe effect of type, amount and timing of food on blood glucose; list 3 methods for planning meals. Outcome: Progressing Towards Goal  Goal: *Incorporating physical activity into lifestyle  Description: State effect of exercise on blood glucose levels. Outcome: Progressing Towards Goal  Goal: *Developing strategies to promote health/change behavior  Description: Define the ABC's of diabetes; identify appropriate screenings, schedule and personal plan for screenings. Outcome: Progressing Towards Goal  Goal: *Using medications safely  Description: State effect of diabetes medications on diabetes; name diabetes medication taking, action and side effects. Outcome: Progressing Towards Goal  Goal: *Monitoring blood glucose, interpreting and using results  Description: Identify recommended blood glucose targets  and personal targets. Outcome: Progressing Towards Goal  Goal: *Prevention, detection, treatment of acute complications  Description: List symptoms of hyper- and hypoglycemia; describe how to treat low blood sugar and actions for lowering  high blood glucose level. Outcome: Progressing Towards Goal  Goal: *Prevention, detection and treatment of chronic complications  Description: Define the natural course of diabetes and describe the relationship of blood glucose levels to long term complications of diabetes.   Outcome: Progressing Towards Goal  Goal: *Developing strategies to address psychosocial issues  Description: Describe feelings about living with diabetes; identify support needed and support network  Outcome: Progressing Towards Goal  Goal: *Insulin pump training  Outcome: Progressing Towards Goal  Goal: *Sick day guidelines  Outcome: Progressing Towards Goal  Goal: *Patient Specific Goal (EDIT GOAL, INSERT TEXT)  Outcome: Progressing Towards Goal     Problem: Patient Education: Go to Patient Education Activity  Goal: Patient/Family Education  Outcome: Progressing Towards Goal     Problem: Falls - Risk of  Goal: *Absence of Falls  Description: Document [place] Fail Fall Risk and appropriate interventions in the flowsheet.   Outcome: Progressing Towards Goal  Note: Fall Risk Interventions:  Mobility Interventions: Communicate number of staff needed for ambulation/transfer    Mentation Interventions: More frequent rounding    Medication Interventions: Patient to call before getting OOB    Elimination Interventions: Call light in reach

## 2022-02-12 NOTE — PROGRESS NOTES
02/12/22 0748   Vitals   Temp 98.2 °F (36.8 °C)   Temp Source Oral   Pulse (Heart Rate) (!) 115   Resp Rate 29   O2 Sat (%) 95 %   Level of Consciousness Alert (0)   BP 92/62   MAP (Monitor) 73   MAP (Calculated) 72   Cardiac Rhythm Sinus Rhythm;Atrial Fib   MEWS Score 5     MEWS 5 due to HR with soft BP. HR fluctuating between . Pt on Amio gtt.

## 2022-02-13 LAB
ANION GAP SERPL CALC-SCNC: 4 MMOL/L (ref 5–15)
ATRIAL RATE: 83 BPM
BACTERIA SPEC CULT: ABNORMAL
BASOPHILS # BLD: 0 K/UL (ref 0–0.1)
BASOPHILS NFR BLD: 0 % (ref 0–1)
BUN SERPL-MCNC: 7 MG/DL (ref 6–20)
BUN/CREAT SERPL: 24 (ref 12–20)
CALCIUM SERPL-MCNC: 7.9 MG/DL (ref 8.5–10.1)
CALCULATED P AXIS, ECG09: 76 DEGREES
CALCULATED R AXIS, ECG10: -10 DEGREES
CALCULATED T AXIS, ECG11: 91 DEGREES
CHLORIDE SERPL-SCNC: 112 MMOL/L (ref 97–108)
CO2 SERPL-SCNC: 25 MMOL/L (ref 21–32)
CREAT SERPL-MCNC: 0.29 MG/DL (ref 0.7–1.3)
DATE LAST DOSE: ABNORMAL
DIAGNOSIS, 93000: NORMAL
DIFFERENTIAL METHOD BLD: ABNORMAL
EOSINOPHIL # BLD: 0.2 K/UL (ref 0–0.4)
EOSINOPHIL NFR BLD: 3 % (ref 0–7)
ERYTHROCYTE [DISTWIDTH] IN BLOOD BY AUTOMATED COUNT: 17.7 % (ref 11.5–14.5)
GLUCOSE BLD STRIP.AUTO-MCNC: 111 MG/DL (ref 65–117)
GLUCOSE BLD STRIP.AUTO-MCNC: 115 MG/DL (ref 65–117)
GLUCOSE BLD STRIP.AUTO-MCNC: 123 MG/DL (ref 65–117)
GLUCOSE BLD STRIP.AUTO-MCNC: 99 MG/DL (ref 65–117)
GLUCOSE SERPL-MCNC: 107 MG/DL (ref 65–100)
GRAM STN SPEC: ABNORMAL
HCT VFR BLD AUTO: 26.9 % (ref 36.6–50.3)
HGB BLD-MCNC: 8.1 G/DL (ref 12.1–17)
IMM GRANULOCYTES # BLD AUTO: 0 K/UL (ref 0–0.04)
IMM GRANULOCYTES NFR BLD AUTO: 1 % (ref 0–0.5)
LYMPHOCYTES # BLD: 1 K/UL (ref 0.8–3.5)
LYMPHOCYTES NFR BLD: 15 % (ref 12–49)
MAGNESIUM SERPL-MCNC: 2 MG/DL (ref 1.6–2.4)
MCH RBC QN AUTO: 23.3 PG (ref 26–34)
MCHC RBC AUTO-ENTMCNC: 30.1 G/DL (ref 30–36.5)
MCV RBC AUTO: 77.5 FL (ref 80–99)
MONOCYTES # BLD: 0.6 K/UL (ref 0–1)
MONOCYTES NFR BLD: 9 % (ref 5–13)
NEUTS SEG # BLD: 4.8 K/UL (ref 1.8–8)
NEUTS SEG NFR BLD: 72 % (ref 32–75)
NRBC # BLD: 0 K/UL (ref 0–0.01)
NRBC BLD-RTO: 0 PER 100 WBC
P-R INTERVAL, ECG05: 168 MS
PHOSPHATE SERPL-MCNC: 2.5 MG/DL (ref 2.6–4.7)
PLATELET # BLD AUTO: 272 K/UL (ref 150–400)
PMV BLD AUTO: 9.1 FL (ref 8.9–12.9)
POTASSIUM SERPL-SCNC: 3.3 MMOL/L (ref 3.5–5.1)
Q-T INTERVAL, ECG07: 364 MS
QRS DURATION, ECG06: 92 MS
QTC CALCULATION (BEZET), ECG08: 427 MS
RBC # BLD AUTO: 3.47 M/UL (ref 4.1–5.7)
REPORTED DOSE,DOSE: ABNORMAL UNITS
REPORTED DOSE/TIME,TMG: ABNORMAL
SERVICE CMNT-IMP: ABNORMAL
SERVICE CMNT-IMP: ABNORMAL
SERVICE CMNT-IMP: NORMAL
SODIUM SERPL-SCNC: 141 MMOL/L (ref 136–145)
VANCOMYCIN TROUGH SERPL-MCNC: 23.9 UG/ML (ref 5–10)
VENTRICULAR RATE, ECG03: 83 BPM
WBC # BLD AUTO: 6.6 K/UL (ref 4.1–11.1)

## 2022-02-13 PROCEDURE — 84100 ASSAY OF PHOSPHORUS: CPT

## 2022-02-13 PROCEDURE — 74011250636 HC RX REV CODE- 250/636: Performed by: EMERGENCY MEDICINE

## 2022-02-13 PROCEDURE — 36415 COLL VENOUS BLD VENIPUNCTURE: CPT

## 2022-02-13 PROCEDURE — 65660000000 HC RM CCU STEPDOWN

## 2022-02-13 PROCEDURE — 74011250637 HC RX REV CODE- 250/637: Performed by: NURSE PRACTITIONER

## 2022-02-13 PROCEDURE — 74011250636 HC RX REV CODE- 250/636: Performed by: STUDENT IN AN ORGANIZED HEALTH CARE EDUCATION/TRAINING PROGRAM

## 2022-02-13 PROCEDURE — 85025 COMPLETE CBC W/AUTO DIFF WBC: CPT

## 2022-02-13 PROCEDURE — 80202 ASSAY OF VANCOMYCIN: CPT

## 2022-02-13 PROCEDURE — 74011000250 HC RX REV CODE- 250: Performed by: NURSE PRACTITIONER

## 2022-02-13 PROCEDURE — 74011000258 HC RX REV CODE- 258: Performed by: NURSE PRACTITIONER

## 2022-02-13 PROCEDURE — 74011250636 HC RX REV CODE- 250/636: Performed by: INTERNAL MEDICINE

## 2022-02-13 PROCEDURE — 74011250637 HC RX REV CODE- 250/637: Performed by: INTERNAL MEDICINE

## 2022-02-13 PROCEDURE — 74011000250 HC RX REV CODE- 250: Performed by: INTERNAL MEDICINE

## 2022-02-13 PROCEDURE — 74011000258 HC RX REV CODE- 258: Performed by: EMERGENCY MEDICINE

## 2022-02-13 PROCEDURE — 74011250637 HC RX REV CODE- 250/637: Performed by: HOSPITALIST

## 2022-02-13 PROCEDURE — 80048 BASIC METABOLIC PNL TOTAL CA: CPT

## 2022-02-13 PROCEDURE — 82962 GLUCOSE BLOOD TEST: CPT

## 2022-02-13 PROCEDURE — 74011250636 HC RX REV CODE- 250/636: Performed by: NURSE PRACTITIONER

## 2022-02-13 PROCEDURE — 83735 ASSAY OF MAGNESIUM: CPT

## 2022-02-13 PROCEDURE — 74011000258 HC RX REV CODE- 258: Performed by: STUDENT IN AN ORGANIZED HEALTH CARE EDUCATION/TRAINING PROGRAM

## 2022-02-13 PROCEDURE — 74011000250 HC RX REV CODE- 250: Performed by: EMERGENCY MEDICINE

## 2022-02-13 PROCEDURE — 77010033678 HC OXYGEN DAILY

## 2022-02-13 PROCEDURE — 74011250636 HC RX REV CODE- 250/636: Performed by: HOSPITALIST

## 2022-02-13 RX ORDER — MORPHINE SULFATE 2 MG/ML
2 INJECTION, SOLUTION INTRAMUSCULAR; INTRAVENOUS
Status: DISCONTINUED | OUTPATIENT
Start: 2022-02-13 | End: 2022-02-18 | Stop reason: HOSPADM

## 2022-02-13 RX ORDER — MORPHINE SULFATE 2 MG/ML
4 INJECTION, SOLUTION INTRAMUSCULAR; INTRAVENOUS ONCE
Status: COMPLETED | OUTPATIENT
Start: 2022-02-13 | End: 2022-02-13

## 2022-02-13 RX ORDER — POTASSIUM CHLORIDE 7.45 MG/ML
10 INJECTION INTRAVENOUS
Status: COMPLETED | OUTPATIENT
Start: 2022-02-13 | End: 2022-02-14

## 2022-02-13 RX ADMIN — HYOSCYAMINE SULFATE: 16 SOLUTION at 22:33

## 2022-02-13 RX ADMIN — CASTOR OIL AND BALSAM, PERU: 788; 87 OINTMENT TOPICAL at 22:29

## 2022-02-13 RX ADMIN — OXYCODONE 10 MG: 5 TABLET ORAL at 16:25

## 2022-02-13 RX ADMIN — METRONIDAZOLE 500 MG: 500 INJECTION, SOLUTION INTRAVENOUS at 22:24

## 2022-02-13 RX ADMIN — Medication 1 CAPSULE: at 09:57

## 2022-02-13 RX ADMIN — PANTOPRAZOLE SODIUM 40 MG: 40 TABLET, DELAYED RELEASE ORAL at 09:57

## 2022-02-13 RX ADMIN — MORPHINE SULFATE 2 MG: 2 INJECTION, SOLUTION INTRAMUSCULAR; INTRAVENOUS at 17:44

## 2022-02-13 RX ADMIN — ENOXAPARIN SODIUM 40 MG: 100 INJECTION SUBCUTANEOUS at 09:59

## 2022-02-13 RX ADMIN — CEFEPIME HYDROCHLORIDE 2 G: 2 INJECTION, POWDER, FOR SOLUTION INTRAVENOUS at 06:12

## 2022-02-13 RX ADMIN — OXYCODONE 10 MG: 5 TABLET ORAL at 10:10

## 2022-02-13 RX ADMIN — HYOSCYAMINE SULFATE: 16 SOLUTION at 05:43

## 2022-02-13 RX ADMIN — CEFTRIAXONE SODIUM 2 G: 2 INJECTION, POWDER, FOR SOLUTION INTRAMUSCULAR; INTRAVENOUS at 13:32

## 2022-02-13 RX ADMIN — ACETAMINOPHEN 650 MG: 325 TABLET ORAL at 06:11

## 2022-02-13 RX ADMIN — HYOSCYAMINE SULFATE: 16 SOLUTION at 10:02

## 2022-02-13 RX ADMIN — POTASSIUM CHLORIDE 10 MEQ: 10 INJECTION, SOLUTION INTRAVENOUS at 10:00

## 2022-02-13 RX ADMIN — FERROUS SULFATE TAB 325 MG (65 MG ELEMENTAL FE) 325 MG: 325 (65 FE) TAB at 16:24

## 2022-02-13 RX ADMIN — DRONABINOL 2.5 MG: 2.5 CAPSULE ORAL at 09:57

## 2022-02-13 RX ADMIN — SODIUM CHLORIDE 125 ML/HR: 9 INJECTION, SOLUTION INTRAVENOUS at 09:00

## 2022-02-13 RX ADMIN — MIDODRINE HYDROCHLORIDE 5 MG: 5 TABLET ORAL at 16:25

## 2022-02-13 RX ADMIN — MIDODRINE HYDROCHLORIDE 5 MG: 5 TABLET ORAL at 12:15

## 2022-02-13 RX ADMIN — CASTOR OIL AND BALSAM, PERU: 788; 87 OINTMENT TOPICAL at 10:03

## 2022-02-13 RX ADMIN — MIRTAZAPINE 30 MG: 15 TABLET, FILM COATED ORAL at 22:26

## 2022-02-13 RX ADMIN — POTASSIUM CHLORIDE 10 MEQ: 10 INJECTION, SOLUTION INTRAVENOUS at 11:03

## 2022-02-13 RX ADMIN — DIGOXIN 0.12 MG: 125 TABLET ORAL at 09:59

## 2022-02-13 RX ADMIN — SODIUM CHLORIDE, PRESERVATIVE FREE 10 ML: 5 INJECTION INTRAVENOUS at 13:33

## 2022-02-13 RX ADMIN — DRONABINOL 2.5 MG: 2.5 CAPSULE ORAL at 16:24

## 2022-02-13 RX ADMIN — MIDODRINE HYDROCHLORIDE 5 MG: 5 TABLET ORAL at 09:57

## 2022-02-13 RX ADMIN — Medication 3 ML: at 10:02

## 2022-02-13 RX ADMIN — METRONIDAZOLE 500 MG: 500 INJECTION, SOLUTION INTRAVENOUS at 09:59

## 2022-02-13 RX ADMIN — AMIODARONE HYDROCHLORIDE 0.5 MG/MIN: 50 INJECTION, SOLUTION INTRAVENOUS at 18:36

## 2022-02-13 RX ADMIN — SERTRALINE 25 MG: 50 TABLET, FILM COATED ORAL at 09:57

## 2022-02-13 RX ADMIN — OXYCODONE 10 MG: 5 TABLET ORAL at 03:18

## 2022-02-13 RX ADMIN — DOCUSATE SODIUM 50 MG AND SENNOSIDES 8.6 MG 2 TABLET: 8.6; 5 TABLET, FILM COATED ORAL at 22:26

## 2022-02-13 RX ADMIN — Medication 3 ML: at 22:32

## 2022-02-13 RX ADMIN — ASPIRIN 81 MG CHEWABLE TABLET 81 MG: 81 TABLET CHEWABLE at 09:57

## 2022-02-13 NOTE — PROGRESS NOTES
1900: Bedside and Verbal shift change report given to Ke Farrell / Luis Antonio Decker RN (oncoming nurse) by Alivia Herrera RN (offgoing nurse). Report included the following information SBAR, Kardex, ED Summary, Procedure Summary, Intake/Output, MAR, Accordion, Recent Results, Med Rec Status, Cardiac Rhythm NSR and Alarm Parameters . 0500: TRANSFER - OUT REPORT:    Verbal report given to Roíco MIXON (name) on Liang Stoddard  being transferred to [facility] (unit) for routine progression of care       Report consisted of patients Situation, Background, Assessment and   Recommendations(SBAR). Information from the following report(s) SBAR, Kardex, ED Summary, Intake/Output, MAR, Accordion, Recent Results, Med Rec Status, Cardiac Rhythm NSR and Alarm Parameters  was reviewed with the receiving nurse. Lines:   Peripheral IV 02/09/22 Posterior;Right Forearm (Active)   Site Assessment Clean, dry, & intact 02/13/22 0300   Phlebitis Assessment 0 02/13/22 0300   Infiltration Assessment 0 02/13/22 0300   Dressing Status Clean, dry, & intact 02/13/22 0300   Dressing Type Tape;Transparent 02/13/22 0300   Hub Color/Line Status Pink; Infusing 02/13/22 0300   Action Taken Open ports on tubing capped 02/13/22 0300   Alcohol Cap Used Yes 02/13/22 0300       Peripheral IV 02/10/22 Left Wrist (Active)   Site Assessment Clean, dry, & intact 02/13/22 0300   Phlebitis Assessment 0 02/13/22 0300   Infiltration Assessment 0 02/13/22 0300   Dressing Status Clean, dry, & intact 02/13/22 0300   Dressing Type Tape;Transparent 02/13/22 0300   Hub Color/Line Status Pink; Infusing 02/13/22 0300   Action Taken Open ports on tubing capped 02/13/22 0300   Alcohol Cap Used Yes 02/13/22 0300        Opportunity for questions and clarification was provided.       Patient transported with:   Monitor  O2 @ 2 liters  Registered Nurse  [facility]

## 2022-02-13 NOTE — PROGRESS NOTES
Problem: Pressure Injury - Risk of  Goal: *Prevention of pressure injury  Description: Document Lewis Scale and appropriate interventions in the flowsheet. Outcome: Progressing Towards Goal  Note: Pressure Injury Interventions:  Sensory Interventions: Assess changes in LOC    Moisture Interventions: Minimize layers    Activity Interventions: Increase time out of bed    Mobility Interventions: Assess need for specialty bed    Nutrition Interventions: Document food/fluid/supplement intake    Friction and Shear Interventions: Minimize layers                Problem: Patient Education: Go to Patient Education Activity  Goal: Patient/Family Education  Outcome: Progressing Towards Goal     Problem: Falls - Risk of  Goal: *Absence of Falls  Description: Document Bianca Fall Risk and appropriate interventions in the flowsheet. Outcome: Progressing Towards Goal  Note: Fall Risk Interventions:  Mobility Interventions: Bed/chair exit alarm    Mentation Interventions: Adequate sleep, hydration, pain control    Medication Interventions: Patient to call before getting OOB,Teach patient to arise slowly    Elimination Interventions: Bed/chair exit alarm              Problem: Patient Education: Go to Patient Education Activity  Goal: Patient/Family Education  Outcome: Progressing Towards Goal     Problem: General Wound Care  Goal: *Infected Wound: Prevention of further infection and promotion of healing  Description: Infection control procedures (eg: clean dressings, clean gloves, hand washing, precautions to isolate wound from contamination, sterile instruments used for wound debridement) should be implemented. Outcome: Progressing Towards Goal  Goal: Interventions  Outcome: Progressing Towards Goal     Problem: Diabetes Self-Management  Goal: *Disease process and treatment process  Description: Define diabetes and identify own type of diabetes; list 3 options for treating diabetes.   Outcome: Progressing Towards Goal  Goal: *Incorporating nutritional management into lifestyle  Description: Describe effect of type, amount and timing of food on blood glucose; list 3 methods for planning meals. Outcome: Progressing Towards Goal  Goal: *Incorporating physical activity into lifestyle  Description: State effect of exercise on blood glucose levels. Outcome: Progressing Towards Goal  Goal: *Developing strategies to promote health/change behavior  Description: Define the ABC's of diabetes; identify appropriate screenings, schedule and personal plan for screenings. Outcome: Progressing Towards Goal  Goal: *Using medications safely  Description: State effect of diabetes medications on diabetes; name diabetes medication taking, action and side effects. Outcome: Progressing Towards Goal  Goal: *Monitoring blood glucose, interpreting and using results  Description: Identify recommended blood glucose targets  and personal targets. Outcome: Progressing Towards Goal  Goal: *Prevention, detection, treatment of acute complications  Description: List symptoms of hyper- and hypoglycemia; describe how to treat low blood sugar and actions for lowering  high blood glucose level. Outcome: Progressing Towards Goal  Goal: *Prevention, detection and treatment of chronic complications  Description: Define the natural course of diabetes and describe the relationship of blood glucose levels to long term complications of diabetes.   Outcome: Progressing Towards Goal  Goal: *Developing strategies to address psychosocial issues  Description: Describe feelings about living with diabetes; identify support needed and support network  Outcome: Progressing Towards Goal  Goal: *Insulin pump training  Outcome: Progressing Towards Goal  Goal: *Sick day guidelines  Outcome: Progressing Towards Goal  Goal: *Patient Specific Goal (EDIT GOAL, INSERT TEXT)  Outcome: Progressing Towards Goal     Problem: Patient Education: Go to Patient Education Activity  Goal: Patient/Family Education  Outcome: Progressing Towards Goal     Problem: Patient Education: Go to Patient Education Activity  Goal: Patient/Family Education  Outcome: Progressing Towards Goal     Problem: Afib Pathway: Day 1  Goal: Off Pathway (Use only if patient is Off Pathway)  Outcome: Progressing Towards Goal  Goal: Activity/Safety  Outcome: Progressing Towards Goal  Goal: Consults, if ordered  Outcome: Progressing Towards Goal  Goal: Diagnostic Test/Procedures  Outcome: Progressing Towards Goal  Goal: Nutrition/Diet  Outcome: Progressing Towards Goal  Goal: Discharge Planning  Outcome: Progressing Towards Goal  Goal: Medications  Outcome: Progressing Towards Goal  Goal: Respiratory  Outcome: Progressing Towards Goal  Goal: Treatments/Interventions/Procedures  Outcome: Progressing Towards Goal  Goal: Psychosocial  Outcome: Progressing Towards Goal  Goal: *Optimal pain control at patient's stated goal  Outcome: Progressing Towards Goal  Goal: *Hemodynamically stable  Outcome: Progressing Towards Goal  Goal: *Stable cardiac rhythm  Outcome: Progressing Towards Goal  Goal: *Lungs clear or at baseline  Outcome: Progressing Towards Goal  Goal: *Labs within defined limits  Outcome: Progressing Towards Goal  Goal: *Describes available resources and support systems  Outcome: Progressing Towards Goal     Problem: Afib: Discharge Outcomes (not in CAT)  Goal: *Hemodynamically stable  Outcome: Progressing Towards Goal  Goal: *Stable cardiac rhythm  Outcome: Progressing Towards Goal  Goal: *Lungs clear or at baseline  Outcome: Progressing Towards Goal  Goal: *Optimal pain control at patient's stated goal  Outcome: Progressing Towards Goal  Goal: *Identifies cardiac risk factors  Outcome: Progressing Towards Goal  Goal: *Verbalizes home exercise program, activity guidelines, cardiac precautions  Outcome: Progressing Towards Goal  Goal: *Verbalizes understanding and describes prescribed diet  Outcome: Progressing Towards Goal  Goal: *Verbalizes understanding and describes medication purposes and frequencies  Outcome: Progressing Towards Goal  Goal: *Anxiety reduced or absent  Outcome: Progressing Towards Goal  Goal: *Understands and describes signs and symptoms to report to providers(Stroke Metric)  Outcome: Progressing Towards Goal  Goal: *Describes follow-up/return visits to physicians  Outcome: Progressing Towards Goal  Goal: *Describes available resources and support systems  Outcome: Progressing Towards Goal  Goal: *Influenza immunization  Outcome: Progressing Towards Goal  Goal: *Pneumococcal immunization  Outcome: Progressing Towards Goal  Goal: *Describes smoking cessation resources  Outcome: Progressing Towards Goal

## 2022-02-13 NOTE — PROGRESS NOTES
Wound cultures reviewed. Stop vanc cefepime and switch to ceftriaxone 2gm daily. Continue IV flagyl. Final recs per Dr. Korey Munoz, to resume care from 2/14/22.          Doretha Desir MD  Infectious Diseases

## 2022-02-13 NOTE — PROGRESS NOTES
CARDIOLOGY Progress Note    Patient ID:  Patient: Regan Collins  MRN: [id number]  Age: 80 y.o.  : 1950    Date of  Admission: 2022  6:34 PM   PCP:  Verito Maldonado MD    Assessment: 1. Atrial fibrillation with RVR, paroxysmal.  Back in sinus. 2. CAD with remote coronary stenting. No angina. 3. Normal LV systolic function on echo. 4. 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 Strep agalactiae and Proteus on BCX, Proteus UTI. 5. Sepsis, improving. Multiple sites for bacterial entry. 6. Multiple sclerosis with severe debility (functional quadriplegic). 7. Remote stroke in .  8. Full code. Plan:     1. Continue digoxin for rate control. 2. Didn't initially have BP to tolerate metoprolol. OK with midodrine. 3. Amiodarone changed to oral 400 mg po BID.  4. On DVT prophylaxis, not a good chronic full anticoagulation candidate due to bleeding risk, anemia, etc.  Discussed the status of his wounds and bleeding risk from these with wound care, the ankle one would be the one at risk to bleed. I'll defer to the primary team on if and when anticoagulation dosing is escalated. 5. Final read on transthoracic echo pending. Since he's back in sinus, no BRITANY-cardioversion needed. Will see if he still needs a BRITANY to evaluate the mitral valve later in his admission. [x]       High complexity decision making was performed in this patient at high risk for decompensation with multiple organ involvement. Regan Collins is a 80 y.o. male with a history of MS. He was admitted with sepsis and found to have bacteremia from both  Yaya Hunker and GNR. On antibiotics. His echo at the bedside today shows normal LV systolic function, mild MR, and a probable ~1 cm vegetation on the mitral valve.   His initial ECG on  shows sinus rhythm, on tele today he's in Afib.  He got IV metoprolol and his BP dropped, back up some with IVF. He denies chest pain, syncope, palpitations, MATUTE, palpitations. Past Medical History:   Diagnosis Date    MS (multiple sclerosis) ([address]     Stroke [facility])         Past Surgical History:   Procedure Laterality Date    HX CORONARY STENT PLACEMENT      x7        Social History     Tobacco Use    Smoking status: Never Smoker    Smokeless tobacco: Not on file   Substance Use Topics    Alcohol use: No        No family history on file.      Allergies   Allergen Reactions    Beeswax Anaphylaxis    Bee Pollen Unknown (comments)    Simvastatin Not Reported This Time    Tolterodine Not Reported This Time    Venom-Wasp Not Reported This Time          Current Facility-Administered Medications   Medication Dose Route Frequency    cefTRIAXone (ROCEPHIN) 2 g in 0.9% sodium chloride (MBP/ADV) 50 mL MBP  2 g IntraVENous Q24H    amiodarone (CORDARONE) 375 mg/250 mL D5W infusion  0.5 mg/min IntraVENous CONTINUOUS    midodrine (PROAMATINE) tablet 5 mg  5 mg Oral TID WITH MEALS    sodium hypochlorite (HALF STRENGTH DAKIN'S) 0.25% irrigation (bottle)   Topical BID    metroNIDAZOLE (FLAGYL) IVPB premix 500 mg  500 mg IntraVENous Q12H    [Held by provider] metoprolol tartrate (LOPRESSOR) tablet 12.5 mg  12.5 mg Oral Q12H    balsam peru-castor oiL (VENELEX) ointment   Topical BID    ferrous sulfate tablet 325 mg  325 mg Oral EVERY OTHER DAY    lidocaine/EPINEPHrine/tetracaine (L.E.T) topical gel  3 mL Topical Q12H    digoxin (LANOXIN) tablet 0.125 mg  0.125 mg Oral DAILY    dextrose 10% infusion 125-250 mL  125-250 mL IntraVENous PRN    oxyCODONE IR (ROXICODONE) tablet 5-10 mg  5-10 mg Oral Q4H PRN    sodium chloride (NS) flush 5-10 mL  5-10 mL IntraVENous PRN    acetaminophen (TYLENOL) tablet 650 mg  650 mg Oral Q6H PRN    ondansetron (ZOFRAN) injection 4 mg  4 mg IntraVENous Q6H PRN    aspirin chewable tablet 81 mg  81 mg Oral DAILY    albuterol-ipratropium (DUO-NEB) 2.5 MG-0.5 MG/3 ML  3 mL Nebulization Q4H PRN    guaiFENesin (ROBITUSSIN) 100 mg/5 mL oral liquid 200 mg  200 mg Oral Q6H PRN    dronabinoL (MARINOL) capsule 2.5 mg  2.5 mg Oral ACB&D    mirtazapine (REMERON) tablet 30 mg  30 mg Oral QHS    insulin lispro (HUMALOG) injection   SubCUTAneous AC&HS    glucose chewable tablet 16 g  4 Tablet Oral PRN    glucagon (GLUCAGEN) injection 1 mg  1 mg IntraMUSCular PRN    pantoprazole (PROTONIX) tablet 40 mg  40 mg Oral ACB    senna-docusate (PERICOLACE) 8.6-50 mg per tablet 2 Tablet  2 Tablet Oral QHS    sertraline (ZOLOFT) tablet 25 mg  25 mg Oral DAILY    glatiramer (COPAXONE) injection 20 mg  20 mg SubCUTAneous Q MON, WED & FRI    0.9% sodium chloride infusion  125 mL/hr IntraVENous CONTINUOUS    L.acidophilus-paracasei-S.thermophil-bifidobacter (RISAQUAD) 8 billion cell capsule  1 Capsule Oral DAILY    enoxaparin (LOVENOX) injection 40 mg  40 mg SubCUTAneous DAILY       Review of Symptoms:    General: +fever, chills, sweats, weakness, weight loss   Ear Nose and Throat: negative for rhinorrhea, pharyngitis, otalgia, tinnitus, speech or swallowing difficulties   Respiratory: + chronic cough, negative for SOB, sputum production, wheezing, MATUTE, pleuritic pain   Cardiology: negative for chest pain, palpitations, orthopnea, PND, edema, syncope   Gastrointestinal: negative for abdominal pain, N/V, dysphagia   Genitourinary: +incontinence          Objective:      Physical Exam:  Temp (24hrs), Av.4 °F (36.9 °C), Min:97.9 °F (36.6 °C), Max:99.1 °F (37.3 °C)    Patient Vitals for the past 8 hrs:   Pulse   22 1142 80   22 0746 80   22 0606 81    Patient Vitals for the past 8 hrs:   Resp   22 1142 28   22 0746 28   22 0606 28    Patient Vitals for the past 8 hrs:   BP   02/13/22 1142 (!) 117/57   22 0746 127/70   22 0606 124/68          Intake/Output Summary (Last 24 hours) at 2022 1318  Last data filed at 2/12/2022 2300  Gross per 24 hour   Intake 3373.66 ml   Output 1000 ml   Net 2373.66 ml       Nondiaphoretic, not in acute distress, slender male. No scleral icterus, mucous membranes moist, conjuctivae pink, no xanthelasma. Unlabored, clear to auscultation bilaterally anteriorly, symmetric air movement. Irregular rhythm, +soft systolic murmur, no rub. No peripheral edema. Palpable radial pulses bilaterally. Abdomen, soft, nontender, nondistended. Extremities without cyanosis or clubbing. Skin warm and dry. No rashes or ulcers. Neuro grossly nonfocal.  No tremor. Awake and appropriate. CARDIOGRAPHICS and STUDIES, I reviewed:    Telemetry:  Afib. ECG:  Afib. Echo:  Final read pending. Labs:  No results for input(s): CPK, CKMB, CKNDX, TROIQ in the last 72 hours. No lab exists for component: CPKMB  No results found for: CHOL, CHOLX, CHLST, CHOLV, HDL, HDLP, LDL, LDLC, DLDLP, TGLX, TRIGL, TRIGP, CHHD, CHHDX  No results for input(s): INR, PTP, APTT, INREXT, INREXT in the last 72 hours. Recent Labs     02/13/22  0433 02/12/22  0342    139   K 3.3* 3.6   * 109*   CO2 25 24   BUN 7 10   CREA 0.29* 0.32*   * 121*   PHOS 2.5* 2.9   CA 7.9* 8.2*   WBC 6.6 8.6   HGB 8.1* 8.5*   HCT 26.9* 29.0*    313     No results for input(s): AP, TBIL, TP, ALB, GLOB, GGT, AML, LPSE in the last 72 hours. No lab exists for component: SGOT, GPT, AMYP, HLPSE  No components found for: GLPOC  No results for input(s): PH, PCO2, PO2 in the last 72 hours.         Luis Felipe Reilly MD  2/13/2022

## 2022-02-13 NOTE — PROGRESS NOTES
Hospitalist Progress Note    NAME: Reynaldo Humphreys   :  1950   MRN:  [id number]       Assessment / Plan:  Sepsis (tachycardia and fever) due to infected sacral and left ischium pressure ulcers with history of chronic osteomyelitis  CT scan showed 1. Deep soft tissue ulcerations extending to the bilateral ischial tuberosities  with associated acute osteomyelitis. 2.  Chronic osteomyelitis involving the sacrococcygeal junction with overlying  soft tissue ulceration and abscess. 3.  Bilateral femoral head avascular necrosis with subtle subchondral collapse  + Leukocytosis, fever   Blood cultures +ve for group B strep, GNR? No longer see results, was this wound culture? .  ID following, Check ECHO.   abx adjusted to rocephin and flagyl by ID  Repeat blood cultures until negative  Wound cultures +ve for probable proteus and possible strep  IVF  General surgery consulted, plan for debridement  Wound care consulted      Atrial Fibrillation  Cardio consulted  Placed on amio drip  C/w  Digoxin  BB DC'd due to BP issues  Considering BRITANY cardioversion and examining mitral valve  No AC indicated right now given history of hematuria and planned I&D     Possible UTI in settings of indwelling mauro  Current antibiotics will cover  Change Mauro per hospital protocol  urine culture +ve for GNR, f/u S&S No longer resulted either     MS with severe debility  bed bound   Continue Copaxone injections     Dysphagia  On regular diet at the nursing home per family request  Multiple MBS done in the past but showing some level of dysphagia  Family and patient is not interested in in speech eval or another MBS  Patient and family is aware of risk of aspiration and accepting it fully  HOB at 45 degree of the time  Aspiration precautions  Supervised feeding  Appetite stimulant     Chronic cough, frequent coughing spells  Neurogenic bladder with indwelling Mauro  CAD: Continue aspirin  Moderate protein calorie malnutrition: Dietary consult  Adult failure to thrive: Continue Remeron and Marinol  Functional quadriplegia  Depression: Continue Zoloft and Remeron  Hx of PE: Anticoagulation was discontinued due to recurrent hematuria          Code Status: full code d/w pt and niece at bedside   Surrogate Decision Maker: AUDREY Gill ( ortho NP [facility]) [phone]  and brother Torrie Browning [phone]     DVT Prophylaxis: lovenox   GI Prophylaxis: not indicated     Baseline: Nursing home resident, bedbound, no children       18.5 - 24.9 Normal weight / Body mass index is 20.32 kg/m². Estimated discharge date: February 14  Barriers: Wound care, bacteremia, infection    Code status: Full  Prophylaxis: Lovenox  Recommended Disposition: SNF/LTC     Subjective:     Chief Complaint / Reason for Physician Visit  Patient seen and examined at the bedside. He states he feels about the same as he usually does. His blood pressure and heart rate are markedly improved since yesterday. He currently has no additional complaints. Discussed with RN events overnight. Patient had a fever at midnight    Review of Systems:  Symptom Y/N Comments  Symptom Y/N Comments   Fever/Chills    Chest Pain     Poor Appetite    Edema     Cough    Abdominal Pain     Sputum    Joint Pain     SOB/MATUTE    Pruritis/Rash     Nausea/vomit    Tolerating PT/OT     Diarrhea    Tolerating Diet     Constipation    Other       Could NOT obtain due to:      Objective:     VITALS:   Last 24hrs VS reviewed since prior progress note.  Most recent are:  Patient Vitals for the past 24 hrs:   Temp Pulse Resp BP SpO2   02/13/22 0746 98.1 °F (36.7 °C) 80 28 127/70 95 %   02/13/22 0606 98.3 °F (36.8 °C) 81 28 124/68 95 %   02/13/22 0300 98.7 °F (37.1 °C) 80 30 (!) 140/91 (!) 89 %   02/12/22 2300 98.4 °F (36.9 °C) 78 29 124/69 97 %   02/12/22 2000 98.6 °F (37 °C) 73 22 111/65 97 %   02/12/22 1900  77 28 106/73    02/12/22 1712  78  111/69    02/12/22 1500 99.1 °F (37.3 °C) 78 17 105/66 96 % 02/12/22 1115 98.6 °F (37 °C) 99 25 100/63 97 %       Intake/Output Summary (Last 24 hours) at 2/13/2022 1000  Last data filed at 2/12/2022 2300  Gross per 24 hour   Intake 3473.66 ml   Output 1000 ml   Net 2473.66 ml        I had a face to face encounter and independently examined this patient on 2/13/2022, as outlined below:  PHYSICAL EXAM:  General: WD, WN. Alert, cooperative, no acute distress , Significantly debilitated, frail, temporal wasting  EENT:  EOMI. Anicteric sclerae. MMM  Resp:  CTA bilaterally, no wheezing or rales. No accessory muscle use  CV:  Regular  rhythm,  No edema  GI:  Soft, Non distended, Non tender. +Bowel sounds  Neurologic:  Alert and oriented X 3, normal speech,   Psych:   Good insight. Not anxious nor agitated  Skin:  No rashes. No jaundice, see wound care for ulcers    Reviewed most current lab test results and cultures  YES  Reviewed most current radiology test results   YES  Review and summation of old records today    NO  Reviewed patient's current orders and MAR    YES  PMH/SH reviewed - no change compared to H&P  ________________________________________________________________________  Care Plan discussed with:    Comments   Patient     Family      RN     Care Manager     Consultant                        Multidiciplinary team rounds were held today with , nursing, pharmacist and clinical coordinator. Patient's plan of care was discussed; medications were reviewed and discharge planning was addressed.      ________________________________________________________________________  Total NON critical care TIME:  33   Minutes    Total CRITICAL CARE TIME Spent:   Minutes non procedure based      Comments   >50% of visit spent in counseling and coordination of care     ________________________________________________________________________  [facility] Bees, MD     Procedures: see electronic medical records for all procedures/Xrays and details which were not copied into this note but were reviewed prior to creation of Plan. LABS:  I reviewed today's most current labs and imaging studies.   Pertinent labs include:  Recent Labs     02/13/22 0433 02/12/22 0342   WBC 6.6 8.6   HGB 8.1* 8.5*   HCT 26.9* 29.0*    313     Recent Labs     02/13/22 0433 02/12/22 0342    139   K 3.3* 3.6   * 109*   CO2 25 24   * 121*   BUN 7 10   CREA 0.29* 0.32*   CA 7.9* 8.2*   MG 2.0 1.9   PHOS 2.5* 2.9       Signed: Mary Salcido MD

## 2022-02-13 NOTE — PROGRESS NOTES
0 - Dr. Alexander Wright notified that patient is feeling severe pain and has increased work of breathing, patient's family was in the room and was requesting a breakthrough dose of morphine. He received 10 mg of oxycodone at 1625. Dr. Alexander Wright said to order 4 mg of morphine IV for a one time dose. 200 - Dr. Alexander Wright notified that family requested that patient only received 2mg of morphine and they said it seemed to help. Patient appears more comfortable with more controlled breathing. Family requested a PRN morphine dose overnight for severe pain. Dr. Alexander Wright said to place orders for morphine 2 Mg IV q4 prn. End of Shift Note    Bedside shift change report given to Martha Mercado RN (oncoming nurse) by Chery Davis RN (offgoing nurse). Report included the following information SBAR, Kardex, Intake/Output, MAR and Recent Results    Shift worked:  Day     Shift summary and any significant changes:    Patient has had stable vital signs, wound care completed this morning, one time dose of morphine given this evening for severe pain. Concerns for physician to address:  n/a     [facility] phone for oncoming shift:  [phone]     Activity:  Activity Level: Bed Rest  Number times ambulated in hallways past shift: 0  Number of times OOB to chair past shift: 0    Cardiac:   Cardiac Monitoring: Yes      Cardiac Rhythm: Sinus Rhythm    Access:   Current line(s): PIV     Genitourinary:   Urinary status: voiding    Respiratory:   O2 Device: Nasal cannula  Chronic home O2 use?: NO  Incentive spirometer at bedside: YES     GI:  Last Bowel Movement Date: 02/12/22  Current diet:  ADULT DIET Regular  ADULT ORAL NUTRITION SUPPLEMENT Breakfast, Dinner; Renal Supplement  Passing flatus: YES  Tolerating current diet: YES       Pain Management:   Patient states pain is manageable on current regimen: YES    Skin:  Lewis Score: 8  Interventions: float heels and increase time out of bed    Patient Safety:  Fall Score:  Total Score: 3  Interventions: gripper socks and pt to call before getting OOB  High Fall Risk: Yes    Length of Stay:  Expected LOS: - - -  Actual LOS: [address], RN

## 2022-02-14 LAB
ANION GAP SERPL CALC-SCNC: 5 MMOL/L (ref 5–15)
BASOPHILS # BLD: 0 K/UL (ref 0–0.1)
BASOPHILS NFR BLD: 0 % (ref 0–1)
BUN SERPL-MCNC: 4 MG/DL (ref 6–20)
BUN/CREAT SERPL: 17 (ref 12–20)
CALCIUM SERPL-MCNC: 7.7 MG/DL (ref 8.5–10.1)
CHLORIDE SERPL-SCNC: 109 MMOL/L (ref 97–108)
CO2 SERPL-SCNC: 27 MMOL/L (ref 21–32)
CREAT SERPL-MCNC: 0.24 MG/DL (ref 0.7–1.3)
DIFFERENTIAL METHOD BLD: ABNORMAL
ECHO AO ASC DIAM: 3.5 CM
ECHO AO ASCENDING AORTA INDEX: 1.81 CM/M2
ECHO AR MAX VEL PISA: 2.8 M/S
ECHO AV AREA PEAK VELOCITY: 2.2 CM2
ECHO AV AREA PEAK VELOCITY: 2.4 CM2
ECHO AV AREA VTI: 2 CM2
ECHO AV AREA VTI: 2.2 CM2
ECHO AV MEAN GRADIENT: 4 MMHG
ECHO AV MEAN VELOCITY: 1 M/S
ECHO AV PEAK GRADIENT: 6 MMHG
ECHO AV PEAK VELOCITY: 1.3 M/S
ECHO AV REGURGITANT PHT: 695.2 MILLISECOND
ECHO AV VELOCITY RATIO: 0.54
ECHO AV VTI: 21 CM
ECHO LA DIAMETER INDEX: 2.07 CM/M2
ECHO LA DIAMETER: 4 CM
ECHO LV E' LATERAL VELOCITY: 15 CM/S
ECHO LV E' SEPTAL VELOCITY: 9 CM/S
ECHO LV FRACTIONAL SHORTENING: 30 % (ref 28–44)
ECHO LV INTERNAL DIMENSION DIASTOLE INDEX: 2.59 CM/M2
ECHO LV INTERNAL DIMENSION DIASTOLIC: 5 CM (ref 4.2–5.9)
ECHO LV INTERNAL DIMENSION SYSTOLIC INDEX: 1.81 CM/M2
ECHO LV INTERNAL DIMENSION SYSTOLIC: 3.5 CM
ECHO LV IVSD: 1.1 CM (ref 0.6–1)
ECHO LV MASS 2D: 182 G (ref 88–224)
ECHO LV MASS INDEX 2D: 94.3 G/M2 (ref 49–115)
ECHO LV POSTERIOR WALL DIASTOLIC: 0.9 CM (ref 0.6–1)
ECHO LV RELATIVE WALL THICKNESS RATIO: 0.36
ECHO LVOT AREA: 3.8 CM2
ECHO LVOT AV VTI INDEX: 0.52
ECHO LVOT DIAM: 2.2 CM
ECHO LVOT MEAN GRADIENT: 1 MMHG
ECHO LVOT PEAK GRADIENT: 2 MMHG
ECHO LVOT PEAK VELOCITY: 0.7 M/S
ECHO LVOT STROKE VOLUME INDEX: 21.5 ML/M2
ECHO LVOT SV: 41.4 ML
ECHO LVOT VTI: 10.9 CM
ECHO MV AREA VTI: 1.4 CM2
ECHO MV LVOT VTI INDEX: 2.72
ECHO MV MAX VELOCITY: 1.6 M/S
ECHO MV MEAN GRADIENT: 2 MMHG
ECHO MV MEAN VELOCITY: 0.6 M/S
ECHO MV PEAK GRADIENT: 10 MMHG
ECHO MV REGURGITANT PEAK GRADIENT: 19 MMHG
ECHO MV REGURGITANT PEAK VELOCITY: 2.2 M/S
ECHO MV VTI: 29.7 CM
ECHO TV REGURGITANT MAX VELOCITY: 1.98 M/S
ECHO TV REGURGITANT PEAK GRADIENT: 16 MMHG
EOSINOPHIL # BLD: 0.2 K/UL (ref 0–0.4)
EOSINOPHIL NFR BLD: 2 % (ref 0–7)
ERYTHROCYTE [DISTWIDTH] IN BLOOD BY AUTOMATED COUNT: 17.8 % (ref 11.5–14.5)
GLUCOSE BLD STRIP.AUTO-MCNC: 100 MG/DL (ref 65–117)
GLUCOSE BLD STRIP.AUTO-MCNC: 104 MG/DL (ref 65–117)
GLUCOSE BLD STRIP.AUTO-MCNC: 105 MG/DL (ref 65–117)
GLUCOSE BLD STRIP.AUTO-MCNC: 114 MG/DL (ref 65–117)
GLUCOSE SERPL-MCNC: 97 MG/DL (ref 65–100)
HCT VFR BLD AUTO: 29.2 % (ref 36.6–50.3)
HGB BLD-MCNC: 8.5 G/DL (ref 12.1–17)
IMM GRANULOCYTES # BLD AUTO: 0.1 K/UL (ref 0–0.04)
IMM GRANULOCYTES NFR BLD AUTO: 1 % (ref 0–0.5)
LYMPHOCYTES # BLD: 1.1 K/UL (ref 0.8–3.5)
LYMPHOCYTES NFR BLD: 11 % (ref 12–49)
MAGNESIUM SERPL-MCNC: 2 MG/DL (ref 1.6–2.4)
MCH RBC QN AUTO: 22.8 PG (ref 26–34)
MCHC RBC AUTO-ENTMCNC: 29.1 G/DL (ref 30–36.5)
MCV RBC AUTO: 78.5 FL (ref 80–99)
MONOCYTES # BLD: 0.8 K/UL (ref 0–1)
MONOCYTES NFR BLD: 9 % (ref 5–13)
NEUTS SEG # BLD: 7.7 K/UL (ref 1.8–8)
NEUTS SEG NFR BLD: 77 % (ref 32–75)
NRBC # BLD: 0 K/UL (ref 0–0.01)
NRBC BLD-RTO: 0 PER 100 WBC
PHOSPHATE SERPL-MCNC: 2.2 MG/DL (ref 2.6–4.7)
PLATELET # BLD AUTO: 355 K/UL (ref 150–400)
PMV BLD AUTO: 9.8 FL (ref 8.9–12.9)
POTASSIUM SERPL-SCNC: 3.5 MMOL/L (ref 3.5–5.1)
RBC # BLD AUTO: 3.72 M/UL (ref 4.1–5.7)
SERVICE CMNT-IMP: NORMAL
SODIUM SERPL-SCNC: 141 MMOL/L (ref 136–145)
WBC # BLD AUTO: 9.8 K/UL (ref 4.1–11.1)

## 2022-02-14 PROCEDURE — 74011250637 HC RX REV CODE- 250/637: Performed by: INTERNAL MEDICINE

## 2022-02-14 PROCEDURE — 80048 BASIC METABOLIC PNL TOTAL CA: CPT

## 2022-02-14 PROCEDURE — 99232 SBSQ HOSP IP/OBS MODERATE 35: CPT | Performed by: SURGERY

## 2022-02-14 PROCEDURE — 74011636637 HC RX REV CODE- 636/637: Performed by: HOSPITALIST

## 2022-02-14 PROCEDURE — 74011250637 HC RX REV CODE- 250/637: Performed by: NURSE PRACTITIONER

## 2022-02-14 PROCEDURE — 74011000258 HC RX REV CODE- 258: Performed by: STUDENT IN AN ORGANIZED HEALTH CARE EDUCATION/TRAINING PROGRAM

## 2022-02-14 PROCEDURE — 74011250636 HC RX REV CODE- 250/636: Performed by: INTERNAL MEDICINE

## 2022-02-14 PROCEDURE — 99223 1ST HOSP IP/OBS HIGH 75: CPT | Performed by: NURSE PRACTITIONER

## 2022-02-14 PROCEDURE — 99233 SBSQ HOSP IP/OBS HIGH 50: CPT | Performed by: INTERNAL MEDICINE

## 2022-02-14 PROCEDURE — 74011250636 HC RX REV CODE- 250/636: Performed by: HOSPITALIST

## 2022-02-14 PROCEDURE — 36415 COLL VENOUS BLD VENIPUNCTURE: CPT

## 2022-02-14 PROCEDURE — 74011250637 HC RX REV CODE- 250/637: Performed by: HOSPITALIST

## 2022-02-14 PROCEDURE — 74011000258 HC RX REV CODE- 258: Performed by: INTERNAL MEDICINE

## 2022-02-14 PROCEDURE — 74011250636 HC RX REV CODE- 250/636: Performed by: NURSE PRACTITIONER

## 2022-02-14 PROCEDURE — 84100 ASSAY OF PHOSPHORUS: CPT

## 2022-02-14 PROCEDURE — 74011000250 HC RX REV CODE- 250: Performed by: INTERNAL MEDICINE

## 2022-02-14 PROCEDURE — 85025 COMPLETE CBC W/AUTO DIFF WBC: CPT

## 2022-02-14 PROCEDURE — 65660000000 HC RM CCU STEPDOWN

## 2022-02-14 PROCEDURE — 82962 GLUCOSE BLOOD TEST: CPT

## 2022-02-14 PROCEDURE — 83735 ASSAY OF MAGNESIUM: CPT

## 2022-02-14 PROCEDURE — 74011000258 HC RX REV CODE- 258: Performed by: NURSE PRACTITIONER

## 2022-02-14 PROCEDURE — 74011250636 HC RX REV CODE- 250/636: Performed by: STUDENT IN AN ORGANIZED HEALTH CARE EDUCATION/TRAINING PROGRAM

## 2022-02-14 PROCEDURE — 74011000250 HC RX REV CODE- 250: Performed by: EMERGENCY MEDICINE

## 2022-02-14 RX ORDER — AMIODARONE HYDROCHLORIDE 200 MG/1
400 TABLET ORAL 2 TIMES DAILY
Status: DISCONTINUED | OUTPATIENT
Start: 2022-02-14 | End: 2022-02-17

## 2022-02-14 RX ORDER — OXYCODONE HYDROCHLORIDE 5 MG/1
5 TABLET ORAL EVERY 4 HOURS
Status: DISCONTINUED | OUTPATIENT
Start: 2022-02-14 | End: 2022-02-16

## 2022-02-14 RX ORDER — ACETAMINOPHEN 325 MG/1
650 TABLET ORAL
Status: CANCELLED | OUTPATIENT
Start: 2022-02-14

## 2022-02-14 RX ORDER — ACETAMINOPHEN 500 MG
1000 TABLET ORAL 3 TIMES DAILY
Status: DISCONTINUED | OUTPATIENT
Start: 2022-02-14 | End: 2022-02-18 | Stop reason: HOSPADM

## 2022-02-14 RX ADMIN — AMIODARONE HYDROCHLORIDE 400 MG: 200 TABLET ORAL at 17:43

## 2022-02-14 RX ADMIN — GLATIRAMER ACETATE 20 MG: 20 INJECTION, SOLUTION SUBCUTANEOUS at 22:59

## 2022-02-14 RX ADMIN — AMPICILLIN SODIUM AND SULBACTAM SODIUM 3 G: 2; 1 INJECTION, POWDER, FOR SOLUTION INTRAMUSCULAR; INTRAVENOUS at 16:10

## 2022-02-14 RX ADMIN — OXYCODONE 5 MG: 5 TABLET ORAL at 16:10

## 2022-02-14 RX ADMIN — CASTOR OIL AND BALSAM, PERU: 788; 87 OINTMENT TOPICAL at 09:08

## 2022-02-14 RX ADMIN — PANTOPRAZOLE SODIUM 40 MG: 40 TABLET, DELAYED RELEASE ORAL at 09:02

## 2022-02-14 RX ADMIN — AMPICILLIN SODIUM AND SULBACTAM SODIUM 3 G: 2; 1 INJECTION, POWDER, FOR SOLUTION INTRAMUSCULAR; INTRAVENOUS at 23:00

## 2022-02-14 RX ADMIN — DRONABINOL 2.5 MG: 2.5 CAPSULE ORAL at 09:02

## 2022-02-14 RX ADMIN — DRONABINOL 2.5 MG: 2.5 CAPSULE ORAL at 16:10

## 2022-02-14 RX ADMIN — ACETAMINOPHEN 1000 MG: 500 TABLET ORAL at 16:09

## 2022-02-14 RX ADMIN — HYOSCYAMINE SULFATE: 16 SOLUTION at 23:00

## 2022-02-14 RX ADMIN — Medication 1 CAPSULE: at 09:02

## 2022-02-14 RX ADMIN — MIDODRINE HYDROCHLORIDE 5 MG: 5 TABLET ORAL at 09:02

## 2022-02-14 RX ADMIN — CEFTRIAXONE SODIUM 2 G: 2 INJECTION, POWDER, FOR SOLUTION INTRAMUSCULAR; INTRAVENOUS at 11:24

## 2022-02-14 RX ADMIN — MIRTAZAPINE 30 MG: 15 TABLET, FILM COATED ORAL at 22:59

## 2022-02-14 RX ADMIN — OXYCODONE 5 MG: 5 TABLET ORAL at 22:59

## 2022-02-14 RX ADMIN — AMIODARONE HYDROCHLORIDE 0.5 MG/MIN: 50 INJECTION, SOLUTION INTRAVENOUS at 07:44

## 2022-02-14 RX ADMIN — ACETAMINOPHEN 1000 MG: 500 TABLET ORAL at 22:59

## 2022-02-14 RX ADMIN — MORPHINE SULFATE 2 MG: 2 INJECTION, SOLUTION INTRAMUSCULAR; INTRAVENOUS at 12:29

## 2022-02-14 RX ADMIN — SODIUM CHLORIDE, PRESERVATIVE FREE 10 ML: 5 INJECTION INTRAVENOUS at 23:01

## 2022-02-14 RX ADMIN — METRONIDAZOLE 500 MG: 500 INJECTION, SOLUTION INTRAVENOUS at 09:02

## 2022-02-14 RX ADMIN — MORPHINE SULFATE 2 MG: 2 INJECTION, SOLUTION INTRAMUSCULAR; INTRAVENOUS at 07:25

## 2022-02-14 RX ADMIN — MORPHINE SULFATE 2 MG: 2 INJECTION, SOLUTION INTRAMUSCULAR; INTRAVENOUS at 00:26

## 2022-02-14 RX ADMIN — DOCUSATE SODIUM 50 MG AND SENNOSIDES 8.6 MG 2 TABLET: 8.6; 5 TABLET, FILM COATED ORAL at 23:00

## 2022-02-14 RX ADMIN — ASPIRIN 81 MG CHEWABLE TABLET 81 MG: 81 TABLET CHEWABLE at 09:02

## 2022-02-14 RX ADMIN — SERTRALINE 25 MG: 50 TABLET, FILM COATED ORAL at 09:02

## 2022-02-14 RX ADMIN — Medication 3 ML: at 23:02

## 2022-02-14 RX ADMIN — OXYCODONE 10 MG: 5 TABLET ORAL at 09:01

## 2022-02-14 RX ADMIN — HYOSCYAMINE SULFATE: 16 SOLUTION at 09:08

## 2022-02-14 RX ADMIN — ENOXAPARIN SODIUM 40 MG: 100 INJECTION SUBCUTANEOUS at 09:01

## 2022-02-14 RX ADMIN — MIDODRINE HYDROCHLORIDE 5 MG: 5 TABLET ORAL at 12:23

## 2022-02-14 RX ADMIN — CASTOR OIL AND BALSAM, PERU: 788; 87 OINTMENT TOPICAL at 23:00

## 2022-02-14 RX ADMIN — MIDODRINE HYDROCHLORIDE 5 MG: 5 TABLET ORAL at 17:43

## 2022-02-14 RX ADMIN — DIGOXIN 0.12 MG: 125 TABLET ORAL at 09:02

## 2022-02-14 NOTE — PROGRESS NOTES
3:40 PM patient with sats at 79% upon arrival to unit, 6L via nc applied. Now 93%. No apparent distress.

## 2022-02-14 NOTE — CONSULTS
Palliative Medicine Consult  Mike: [phone])    Patient Name: Emily Cox  YOB: 1950    Date of Initial Consult: 2/14/22  Reason for Consult: Other- acute on chronic pain  Requesting Provider: Leonarda Gallardo MD  Primary Care Physician: David Braswell MD     SUMMARY:   Emily Cox is a 80 y.o. with a past history of MS, CVA, BPH, HLD, Pressure Ulcers, PE, Malnutrition, Functional Quad, and CAD, who was admitted on 2/9/2022 from home with a diagnosis of sepsis 2/2 infected sacral and left ischium pressure ulcers with a history of chronic osteomyelitis. 2/14:  Earlier today he had some sharp debridement of the necrotic tissue on his sacral ulcer. Overnight his pain was more severe than he was able to tolerate, even with a chronic pain that he lives with all the time     PALLIATIVE DIAGNOSES:   1. Frailty  2. Acute on chronic pain  3. Physical debility  4. Anorexia  5. Fatigue  6. Palliative Encounter     PLAN:   1. Spoke with patients stef Chong prior to seeing patient. Mr Tanner Choudhary has been handling his pain well until last night when it became so severe he didn't want anyone to even touch him. 2. His home dose of Oxycodone 5mg has been essentially scheduled prior to admission to help with his chronic pain, and in addition he takes scheduled tylenol as well. Will start here, as he clearly is not remembering to ask for his meds, and I do not think he can manage asking for a dose before his pain get out of control which is probably contributing to his crisis last night  3. Will leave his 2mg of IV morphine for break through pain and wound care  4. Can consider an opioid rotation, or increasing his scheduled oxycodone to 10mg if escalation is needed, but given his frailty, and the fact that he still wants full restorative care, I wanted to start with what he is used to.  5. Did not talk about DNR, as Mr Tanner Choudhary is very clear on his wishes from what Family shared with me.   His brother (primary mPOA) and niece (secondary mPOA) are very cirilo (niece is an NP here) and understand his prognosis and are prepared to made decisions on his behalf should his health deteriorate  6. Initial consult note routed to primary continuity provider and/or primary health care team members  7. Communicated plan of care with: Palliative IDTGrey 192 Team     GOALS OF CARE / TREATMENT PREFERENCES:     GOALS OF CARE:  Patient/Health Care Proxy Stated Goals: Prolong life    TREATMENT PREFERENCES:   Code Status: Full Code    Advance Care Planning:  [x] [facility] Interdisciplinary Team has updated the ACP Navigator with Health Care Decision Maker and Patient Capacity      Primary Decision MakerNakeila Urena - Wal[place] - [phone]    Secondary Decision Maker: Kit Andrews - Other Relative - [phone]  Advance Care Planning 2/12/2022   Confirm Advance Directive Yes, on file       Medical Interventions: Full interventions       Other:    As far as possible, the palliative care team has discussed with patient / health care proxy about goals of care / treatment preferences for patient.      HISTORY:     History obtained from: chart, family    CHIEF COMPLAINT: pain to sacral wound    HPI/SUBJECTIVE:    The patient is:   [x] Verbal and participatory  [] Non-participatory due to:       Clinical Pain Assessment (nonverbal scale for severity on nonverbal patients):   Clinical Pain Assessment  Severity: 8  Location: sacral wounds  Character: aching, sharp  Duration: chronic  Effect: unable to tell me  Factors: wound care makes it worse  Frequency: constant          Duration: for how long has pt been experiencing pain (e.g., 2 days, 1 month, years)  Frequency: how often pain is an issue (e.g., several times per day, once every few days, constant)     FUNCTIONAL ASSESSMENT:     Palliative Performance Scale (PPS):  PPS: 30       PSYCHOSOCIAL/SPIRITUAL SCREENING:     Palliative IDT has assessed this patient for cultural preferences / practices and a referral made as appropriate to needs (Cultural Services, Patient Advocacy, Ethics, etc.)    Any spiritual / Shinto concerns:  [] Yes /  [x] No   If \"Yes\" to discuss with pastoral care during IDT     Does caregiver feel burdened by caring for their loved one:   [] Yes /  [x] No /  [] No Caregiver Present    If \"Yes\" to discuss with social work during IDT    Anticipatory grief assessment:   [x] Normal  / [] Maladaptive     If \"Maladaptive\" to discuss with social work during IDT    ESAS Anxiety: Anxiety: 0    ESAS Depression: Depression: 0        REVIEW OF SYSTEMS:     Positive and pertinent negative findings in ROS are noted above in HPI. The following systems were [x] reviewed / [] unable to be reviewed as noted in HPI  Other findings are noted below. Systems: constitutional, ears/nose/mouth/throat, respiratory, gastrointestinal, genitourinary, musculoskeletal, integumentary, neurologic, psychiatric, endocrine. Positive findings noted below. Modified ESAS Completed by: provider   Fatigue: 6     Depression: 0 Pain: 8   Anxiety: 0 Nausea: 0   Anorexia: 6 Dyspnea: 0           Stool Occurrence(s): (P) 1        PHYSICAL EXAM:     From RN flowsheet:  Wt Readings from Last 3 Encounters:   02/11/22 154 lb (69.9 kg)   08/06/21 152 lb 1.9 oz (69 kg)   07/20/16 152 lb (68.9 kg)     Blood pressure 138/77, pulse 87, temperature 99.4 °F (37.4 °C), resp. rate 26, height 6' 1\" (1.854 m), weight 154 lb (69.9 kg), SpO2 93 %.     Pain Scale 1: Numeric (0 - 10)  Pain Intensity 1: 4  Pain Onset 1: wound  Pain Location 1: Buttocks,Ankle  Pain Orientation 1: Mid,Right  Pain Description 1: Aching  Pain Intervention(s) 1: Medication (see MAR)  Last bowel movement, if known:     Constitutional: very frail, chronically ill appearing, very slow in his speech, but appears oriented  Respiratory: breathing not labored, symmetric  Gastrointestinal: soft non-tender, +bowel sounds  Musculoskeletal: no deformity, no tenderness to palpation  Skin: warm, dry  Neurologic: following commands, moving all extremities  Psychiatric: full affect, no hallucinations  Other:       HISTORY:     Active Problems:    Sepsis ([facility] [address] (2/9/2022)      Sacral pressure ulcer (2/9/2022)      Past Medical History:   Diagnosis Date    MS (multiple sclerosis) ([facility] [address]     Stroke ([facility] [address]       Past Surgical History:   Procedure Laterality Date    HX CORONARY STENT PLACEMENT      x7       No family history on file. History reviewed, no pertinent family history.   Social History     Tobacco Use    Smoking status: Never Smoker    Smokeless tobacco: Not on file   Substance Use Topics    Alcohol use: No     Allergies   Allergen Reactions    Beeswax Anaphylaxis    Bee Pollen Unknown (comments)    Simvastatin Not Reported This Time    Tolterodine Not Reported This Time    Venom-Wasp Not Reported This Time      Current Facility-Administered Medications   Medication Dose Route Frequency    oxyCODONE IR (ROXICODONE) tablet 5 mg  5 mg Oral Q4H    acetaminophen (TYLENOL) tablet 1,000 mg  1,000 mg Oral TID    ampicillin-sulbactam (UNASYN) 3 g in 0.9% sodium chloride (MBP/ADV) 100 mL MBP  3 g IntraVENous Q6H    amiodarone (CORDARONE) tablet 400 mg  400 mg Oral BID    morphine injection 2 mg  2 mg IntraVENous Q4H PRN    midodrine (PROAMATINE) tablet 5 mg  5 mg Oral TID WITH MEALS    sodium hypochlorite (HALF STRENGTH DAKIN'S) 0.25% irrigation (bottle)   Topical BID    [Held by provider] metoprolol tartrate (LOPRESSOR) tablet 12.5 mg  12.5 mg Oral Q12H    balsam peru-castor oiL (VENELEX) ointment   Topical BID    ferrous sulfate tablet 325 mg  325 mg Oral EVERY OTHER DAY    lidocaine/EPINEPHrine/tetracaine (L.E.T) topical gel  3 mL Topical Q12H    digoxin (LANOXIN) tablet 0.125 mg  0.125 mg Oral DAILY    dextrose 10% infusion 125-250 mL  125-250 mL IntraVENous PRN    sodium chloride (NS) flush 5-10 mL  5-10 mL IntraVENous PRN    ondansetron (ZOFRAN) injection 4 mg  4 mg IntraVENous Q6H PRN    aspirin chewable tablet 81 mg  81 mg Oral DAILY    albuterol-ipratropium (DUO-NEB) 2.5 MG-0.5 MG/3 ML  3 mL Nebulization Q4H PRN    guaiFENesin (ROBITUSSIN) 100 mg/5 mL oral liquid 200 mg  200 mg Oral Q6H PRN    dronabinoL (MARINOL) capsule 2.5 mg  2.5 mg Oral ACB&D    mirtazapine (REMERON) tablet 30 mg  30 mg Oral QHS    insulin lispro (HUMALOG) injection   SubCUTAneous AC&HS    glucose chewable tablet 16 g  4 Tablet Oral PRN    glucagon (GLUCAGEN) injection 1 mg  1 mg IntraMUSCular PRN    pantoprazole (PROTONIX) tablet 40 mg  40 mg Oral ACB    senna-docusate (PERICOLACE) 8.6-50 mg per tablet 2 Tablet  2 Tablet Oral QHS    sertraline (ZOLOFT) tablet 25 mg  25 mg Oral DAILY    glatiramer (COPAXONE) injection 20 mg  20 mg SubCUTAneous Q MON, WED & FRI    0.9% sodium chloride infusion  125 mL/hr IntraVENous CONTINUOUS    L.acidophilus-paracasei-S.thermophil-bifidobacter (RISAQUAD) 8 billion cell capsule  1 Capsule Oral DAILY    enoxaparin (LOVENOX) injection 40 mg  40 mg SubCUTAneous DAILY          LAB AND IMAGING FINDINGS:     Lab Results   Component Value Date/Time    WBC 9.8 02/14/2022 05:46 AM    HGB 8.5 (L) 02/14/2022 05:46 AM    PLATELET 916 76/16/8058 05:46 AM     Lab Results   Component Value Date/Time    Sodium 141 02/14/2022 05:46 AM    Potassium 3.5 02/14/2022 05:46 AM    Chloride 109 (H) 02/14/2022 05:46 AM    CO2 27 02/14/2022 05:46 AM    BUN 4 (L) 02/14/2022 05:46 AM    Creatinine 0.24 (L) 02/14/2022 05:46 AM    Calcium 7.7 (L) 02/14/2022 05:46 AM    Magnesium 2.0 02/14/2022 05:46 AM    Phosphorus 2.2 (L) 02/14/2022 05:46 AM      Lab Results   Component Value Date/Time    Alk.  phosphatase 70 02/10/2022 04:00 AM    Protein, total 6.3 (L) 02/10/2022 04:00 AM    Albumin 1.9 (L) 02/10/2022 04:00 AM    Globulin 4.4 (H) 02/10/2022 04:00 AM     Lab Results   Component Value Date/Time    INR 1.3 (H) 02/10/2022 04:00 AM    Prothrombin time 13.2 (H) 02/10/2022 04:00 AM      No results found for: IRON, FE, TIBC, IBCT, PSAT, FERR   No results found for: PH, PCO2, PO2  No components found for: GLPOC   No results found for: CPK, CKMB             Total time:   Counseling / coordination time, spent as noted above:   > 50% counseling / coordination?:     Prolonged service was provided for  []30 min   []75 min in face to face time in the presence of the patient, spent as noted above. Time Start:   Time End:   Note: this can only be billed with 33241 (initial) or 53011 (follow up). If multiple start / stop times, list each separately.

## 2022-02-14 NOTE — PROGRESS NOTES
CARDIOLOGY Progress Note    Patient ID:  Patient: Yohan Causey  MRN: [id number]  Age: 80 y.o.  : 1950    Date of  Admission: 2022  6:34 PM   PCP:  Gulshan Gan MD    Assessment: 1. Atrial fibrillation with RVR, paroxysmal.  Back in sinus. 2. CAD with remote coronary stenting. No angina. 3. Normal LV systolic function on echo EF 60%. 4. 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 Strep agalactiae and Proteus on BCX, Proteus UTI. 5. Sepsis, improving. Multiple sites for bacterial entry. 6. Multiple sclerosis with severe debility (functional quadriplegic). 7. Remote stroke in .  8. Full code. Plan:     1. Continue digoxin for rate control. 2. Didn't initially have BP to tolerate metoprolol. OK with midodrine. 3. Amiodarone changed to oral 400 mg po BID.  4. On DVT prophylaxis, not a good chronic full anticoagulation candidate due to bleeding risk, anemia, etc.  I'll defer to the primary team on if and when anticoagulation dosing is escalated. Since he's back in sinus, no BRITANY-cardioversion needed. BRITANY requested to evaluate the mitral valve finding, probably will be done later in week. [x]       High complexity decision making was performed in this patient at high risk for decompensation with multiple organ involvement. Yohan Causey is a 80 y.o. male with a history of MS. He was admitted with sepsis and found to have bacteremia from both  [place] and GNR. On antibiotics. His echo at the bedside today shows normal LV systolic function, mild MR, and a probable ~1 cm vegetation on the mitral valve. His initial ECG on  shows sinus rhythm, on tele today he's in Afib. He got IV metoprolol and his BP dropped, back up some with IVF. He denies chest pain, syncope, palpitations, MATUTE, palpitations. Febrile currently.     Past Medical History:   Diagnosis Date    MS (multiple sclerosis) ([facility] [address]     Stroke [facility])         Past Surgical History:   Procedure Laterality Date    HX CORONARY STENT PLACEMENT      x7        Social History     Tobacco Use    Smoking status: Never Smoker    Smokeless tobacco: Not on file   Substance Use Topics    Alcohol use: No        No family history on file.      Allergies   Allergen Reactions    Beeswax Anaphylaxis    Bee Pollen Unknown (comments)    Simvastatin Not Reported This Time    Tolterodine Not Reported This Time    Venom-Wasp Not Reported This Time          Current Facility-Administered Medications   Medication Dose Route Frequency    oxyCODONE IR (ROXICODONE) tablet 5 mg  5 mg Oral Q4H    acetaminophen (TYLENOL) tablet 1,000 mg  1,000 mg Oral TID    ampicillin-sulbactam (UNASYN) 3 g in 0.9% sodium chloride (MBP/ADV) 100 mL MBP  3 g IntraVENous Q6H    amiodarone (CORDARONE) tablet 400 mg  400 mg Oral BID    morphine injection 2 mg  2 mg IntraVENous Q4H PRN    midodrine (PROAMATINE) tablet 5 mg  5 mg Oral TID WITH MEALS    sodium hypochlorite (HALF STRENGTH DAKIN'S) 0.25% irrigation (bottle)   Topical BID    [Held by provider] metoprolol tartrate (LOPRESSOR) tablet 12.5 mg  12.5 mg Oral Q12H    balsam peru-castor oiL (VENELEX) ointment   Topical BID    ferrous sulfate tablet 325 mg  325 mg Oral EVERY OTHER DAY    lidocaine/EPINEPHrine/tetracaine (L.E.T) topical gel  3 mL Topical Q12H    digoxin (LANOXIN) tablet 0.125 mg  0.125 mg Oral DAILY    dextrose 10% infusion 125-250 mL  125-250 mL IntraVENous PRN    sodium chloride (NS) flush 5-10 mL  5-10 mL IntraVENous PRN    ondansetron (ZOFRAN) injection 4 mg  4 mg IntraVENous Q6H PRN    aspirin chewable tablet 81 mg  81 mg Oral DAILY    albuterol-ipratropium (DUO-NEB) 2.5 MG-0.5 MG/3 ML  3 mL Nebulization Q4H PRN    guaiFENesin (ROBITUSSIN) 100 mg/5 mL oral liquid 200 mg  200 mg Oral Q6H PRN    dronabinoL (MARINOL) capsule 2.5 mg 2.5 mg Oral ACB&D    mirtazapine (REMERON) tablet 30 mg  30 mg Oral QHS    insulin lispro (HUMALOG) injection   SubCUTAneous AC&HS    glucose chewable tablet 16 g  4 Tablet Oral PRN    glucagon (GLUCAGEN) injection 1 mg  1 mg IntraMUSCular PRN    pantoprazole (PROTONIX) tablet 40 mg  40 mg Oral ACB    senna-docusate (PERICOLACE) 8.6-50 mg per tablet 2 Tablet  2 Tablet Oral QHS    sertraline (ZOLOFT) tablet 25 mg  25 mg Oral DAILY    glatiramer (COPAXONE) injection 20 mg  20 mg SubCUTAneous Q MON, WED & FRI    0.9% sodium chloride infusion  125 mL/hr IntraVENous CONTINUOUS    L.acidophilus-paracasei-S.thermophil-bifidobacter (RISAQUAD) 8 billion cell capsule  1 Capsule Oral DAILY    enoxaparin (LOVENOX) injection 40 mg  40 mg SubCUTAneous DAILY       Review of Symptoms:    General: +fever, chills, sweats, weakness, weight loss   Ear Nose and Throat: negative for rhinorrhea, pharyngitis, otalgia, tinnitus, speech or swallowing difficulties   Respiratory: + chronic cough, negative for SOB, sputum production, wheezing, MATUTE, pleuritic pain   Cardiology: negative for chest pain, palpitations, orthopnea, PND, edema, syncope   Gastrointestinal: negative for abdominal pain, N/V, dysphagia   Genitourinary: +incontinence          Objective:      Physical Exam:  Temp (24hrs), Av.4 °F (37.4 °C), Min:98.6 °F (37 °C), Max:100.9 °F (38.3 °C)    Patient Vitals for the past 8 hrs:   Pulse   22 1124 87   22 0807 92    Patient Vitals for the past 8 hrs:   Resp   22 1538 26   22 1124 18   22 0807 18    Patient Vitals for the past 8 hrs:   BP   22 1538 138/77   22 1124 (!) 117/59   22 0807 (!) 121/58          Intake/Output Summary (Last 24 hours) at 2022 1601  Last data filed at 2022 1326  Gross per 24 hour   Intake    Output 2250 ml   Net -2250 ml       Nondiaphoretic, not in acute distress, slender male.   No scleral icterus, mucous membranes moist, conjuctivae pink, no xanthelasma. Unlabored, clear to auscultation bilaterally anteriorly, symmetric air movement. Irregular rhythm, +soft systolic murmur, no rub. No peripheral edema. Palpable radial pulses bilaterally. Abdomen, soft, nontender, nondistended. Extremities without cyanosis or clubbing. Skin warm and dry. No rashes or ulcers. Neuro grossly nonfocal.  No tremor. Awake and appropriate. CARDIOGRAPHICS and STUDIES, I reviewed:    Telemetry:  Afib. ECG:  Afib. Echo:  Final read pending. Labs:  No results for input(s): CPK, CKMB, CKNDX, TROIQ in the last 72 hours. No lab exists for component: CPKMB  No results found for: CHOL, CHOLX, CHLST, CHOLV, HDL, HDLP, LDL, LDLC, DLDLP, TGLX, TRIGL, TRIGP, CHHD, CHHDX  No results for input(s): INR, PTP, APTT, INREXT, INREXT in the last 72 hours. Recent Labs     02/14/22  0546 02/13/22  0433 02/12/22  0342    141 139   K 3.5 3.3* 3.6   * 112* 109*   CO2 27 25 24   BUN 4* 7 10   CREA 0.24* 0.29* 0.32*   GLU 97 107* 121*   PHOS 2.2* 2.5* 2.9   CA 7.7* 7.9* 8.2*   WBC 9.8 6.6 8.6   HGB 8.5* 8.1* 8.5*   HCT 29.2* 26.9* 29.0*    272 313     No results for input(s): AP, TBIL, TP, ALB, GLOB, GGT, AML, LPSE in the last 72 hours. No lab exists for component: SGOT, GPT, AMYP, HLPSE  No components found for: GLPOC  No results for input(s): PH, PCO2, PO2 in the last 72 hours.         Thomas Doll MD  2/14/2022

## 2022-02-14 NOTE — PROGRESS NOTES
Hospitalist Progress Note    NAME: Svetlana Munson   :  1950   MRN:  [id number]       Assessment / Plan:  Sepsis (tachycardia and fever) due to infected sacral and left ischium pressure ulcers with history of chronic osteomyelitis  CT scan showed 1. Deep soft tissue ulcerations extending to the bilateral ischial tuberosities  with associated acute osteomyelitis. 2.  Chronic osteomyelitis involving the sacrococcygeal junction with overlying  soft tissue ulceration and abscess. 3.  Bilateral femoral head avascular necrosis with subtle subchondral collapse    Bacteremia with strep agalactiae sero Gp B Strep 1/2 ,P.mrabilis 2nd of 2  BC-  -Strep agalactiae Gp B beta hemolytic  , P.mirabilis 2nd of 2 bottles  Wound cultures--light P.mirabilis, light strep Gp B beta hemolytic, light alpha strep, moderate Bacteroides uniformis, beta lactamase negative. ECHO showed There is flail anterior mitral valve leaflet with ruptured chordae vs can not rule out vegetation. Moderate eccentric posteriorly directed mitral regurgitation. Recommend BRITANY for further evaluation. Will reach out to cardiology  Repeat blood cultures NGTD  abx adjusted to unasyn  Repeat blood cultures until negative  General surgery consulted, underwent debridement   Wound care following      Atrial Fibrillation (now in NSR)  Cardio consulted  Still on amio gtt, hopefully transition to PO?  Will discuss with cardiology  C/w  Digoxin  BB DC'd due to BP issues  Considering BRITANY cardioversion and examining mitral valve  No AC indicated right now given history of hematuria and planned I&D     UTI in settings of indwelling mauro  On Unasyn  Cultures as above     MS with severe debility  bed bound   Continue Copaxone injections  Patient continuing to have a lot of pain  Consulted palliative for pain management recommendations  He is currently placed on oxycodone every 4 hours and morphine for breakthrough     Dysphagia  On regular diet at the nursing home per family request  Multiple MBS done in the past but showing some level of dysphagia  Family and patient is not interested in in speech eval or another MBS  Patient and family is aware of risk of aspiration and accepting it fully  HOB at 39 degree of the time  Aspiration precautions  Supervised feeding  Appetite stimulant     Chronic cough, frequent coughing spells  Neurogenic bladder with indwelling [place]  CAD: Continue aspirin  Moderate protein calorie malnutrition: Dietary consult  Adult failure to thrive: Continue Remeron and Marinol  Functional quadriplegia  Depression: Continue Zoloft and Remeron  Hx of PE: Anticoagulation was discontinued due to recurrent hematuria          Code Status: full code d/w pt and niece at bedside   Surrogate Decision Maker: NOEA niece Elena Kirkpatrick ( ortho [place] [id number] [facility]) [phone]  and brother Beverly Bose [phone]     DVT Prophylaxis: lovenox   GI Prophylaxis: not indicated     Baseline: Nursing home resident, bedbound, no children       18.5 - 24.9 Normal weight / Body mass index is 20.32 kg/m². Estimated discharge date: February 17  Barriers: Wound care, bacteremia, infection    Code status: Full  Prophylaxis: Lovenox  Recommended Disposition: SNF/LTC     Subjective:     Chief Complaint / Reason for Physician Visit  Patient seen and examined at the bedside. Patient was having a lot of pain last night and requested morphine which did improve his symptoms. Patient seen today and still with pain but mildly better. I was able to discuss care with patient's niece as well today. Patient himself has no additional complaints. Discussed with RN events overnight.     Patient had a fever at midnight    Review of Systems:  Symptom Y/N Comments  Symptom Y/N Comments   Fever/Chills    Chest Pain     Poor Appetite    Edema     Cough    Abdominal Pain     Sputum    Joint Pain     SOB/MATUTE    Pruritis/Rash     Nausea/vomit    Tolerating PT/OT     Diarrhea    Tolerating Diet Constipation    Other       Could NOT obtain due to:      Objective:     VITALS:   Last 24hrs VS reviewed since prior progress note. Most recent are:  Patient Vitals for the past 24 hrs:   Temp Pulse Resp BP SpO2   02/14/22 1124 98.9 °F (37.2 °C) 87 18 (!) 117/59 91 %   02/14/22 0807 (!) 100.9 °F (38.3 °C) 92 18 (!) 121/58 93 %   02/14/22 0407 99.1 °F (37.3 °C) 86 18 126/67 90 %   02/14/22 0023 98.6 °F (37 °C) 90 18 134/62 95 %   02/13/22 2157 99.6 °F (37.6 °C) 86 18 122/61 99 %   02/13/22 1538 99 °F (37.2 °C) 84 28 126/67 94 %       Intake/Output Summary (Last 24 hours) at 2/14/2022 1506  Last data filed at 2/14/2022 1326  Gross per 24 hour   Intake    Output 2250 ml   Net -2250 ml        I had a face to face encounter and independently examined this patient on 2/14/2022, as outlined below:  PHYSICAL EXAM:  General: WD, WN. Alert, cooperative, no acute distress , Significantly debilitated, frail, temporal wasting  EENT:  EOMI. Anicteric sclerae. MMM  Resp:  CTA bilaterally, no wheezing or rales. No accessory muscle use  CV:  Regular  rhythm,  No edema  GI:  Soft, Non distended, Non tender. +Bowel sounds  Neurologic:  Alert and oriented X 3, normal speech,   Psych:   Good insight. Not anxious nor agitated  Skin:  No rashes. No jaundice, see wound care for ulcers    Reviewed most current lab test results and cultures  YES  Reviewed most current radiology test results   YES  Review and summation of old records today    NO  Reviewed patient's current orders and MAR    YES  PMH/SH reviewed - no change compared to H&P  ________________________________________________________________________  Care Plan discussed with:    Comments   Patient     Family      RN     Care Manager     Consultant                        Multidiciplinary team rounds were held today with , nursing, pharmacist and clinical coordinator.   Patient's plan of care was discussed; medications were reviewed and discharge planning was addressed. ________________________________________________________________________  Total NON critical care TIME:  33   Minutes    Total CRITICAL CARE TIME Spent:   Minutes non procedure based      Comments   >50% of visit spent in counseling and coordination of care     ________________________________________________________________________  Rayshawn Gottlieb MD     Procedures: see electronic medical records for all procedures/Xrays and details which were not copied into this note but were reviewed prior to creation of Plan. LABS:  I reviewed today's most current labs and imaging studies.   Pertinent labs include:  Recent Labs     02/14/22 0546 02/13/22  0433 02/12/22 0342   WBC 9.8 6.6 8.6   HGB 8.5* 8.1* 8.5*   HCT 29.2* 26.9* 29.0*    272 313     Recent Labs     02/14/22  0546 02/13/22 0433 02/12/22 0342    141 139   K 3.5 3.3* 3.6   * 112* 109*   CO2 27 25 24   GLU 97 107* 121*   BUN 4* 7 10   CREA 0.24* 0.29* 0.32*   CA 7.7* 7.9* 8.2*   MG 2.0 2.0 1.9   PHOS 2.2* 2.5* 2.9       Signed: Rayshawn Gottlieb MD

## 2022-02-14 NOTE — PROGRESS NOTES
Admit Date: 2022    POD * No surgery found *    Procedure:  * No surgery found *      Assessment:   Active Problems:    Sepsis ([address] (2022)      Sacral pressure ulcer (2022)      1. Bacteremia with strep B and proteus mirabalis  2. 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 They likely will never heal.  Fecal diversion may help but comorbidities make it challenging  3. Chronic microcytic anemia.  hgb 8.3  4. Severe protein calorie malnutrition. Alb 2.6. This will prevent wound healing  5. Severe MS.    6.  New left hip deep pressure area that likely was POA and developing        Plan/Recommendations/Medical Decision Makin. Continue  strength Dakins TID over the weekend. I did a little sharp debridement of necrotic tissue today. Wounds improving  2. Continue abx  3. Avoid pressure on wounds      Subjective:     Patient has no new complaints. Objective:     Blood pressure (!) 121/58, pulse 92, temperature (!) 100.9 °F (38.3 °C), resp. rate 18, height 6' 1\" (1.854 m), weight 69.9 kg (154 lb), SpO2 93 %. Temp (24hrs), Av.2 °F (37.3 °C), Min:97.9 °F (36.6 °C), Max:100.9 °F (38.3 °C)      Physical Exam:  LUNG: clear to auscultation bilaterally, HEART: regular rate and rhythm, S1, S2 normal, no murmur, click, rub or gallop, ABDOMEN: soft, non-tender. Bowel sounds normal. No masses,  no organomegaly  EXT:  Contractures  SKIN:  Sacral/ischial wounds cleaning up with some necrotic tissue debrided at bedside today.       Labs:   Recent Results (from the past 48 hour(s))   GLUCOSE, POC    Collection Time: 22 11:52 AM   Result Value Ref Range    Glucose (POC) 120 (H) 65 - 117 mg/dL    Performed by Stephanie WELLINGTON    EKG, 12 LEAD, SUBSEQUENT    Collection Time: 22  2:42 PM   Result Value Ref Range    Ventricular Rate 83 BPM    Atrial Rate 83 BPM    P-R Interval 168 ms    QRS Duration 92 ms    Q-T Interval 364 ms    QTC Calculation (Bezet) 427 ms Calculated P Axis 76 degrees    Calculated R Axis -10 degrees    Calculated T Axis 91 degrees    Diagnosis       Normal sinus rhythm  Possible Left atrial enlargement  Septal infarct (cited on or before 12-FEB-2022)  Abnormal ECG  When compared with ECG of 10-FEB-2022 05:32,  ST now depressed in Lateral leads  Confirmed by Dora Arredondo ([id number]) on 2/13/2022 9:32:38 PM     GLUCOSE, POC    Collection Time: 02/12/22  5:11 PM   Result Value Ref Range    Glucose (POC) 110 65 - 117 mg/dL    Performed by Luis HARTMAN(CON)    GLUCOSE, POC    Collection Time: 02/12/22  9:01 PM   Result Value Ref Range    Glucose (POC) 118 (H) 65 - 117 mg/dL    Performed by Hans Herrera, TROUGH    Collection Time: 02/13/22  4:33 AM   Result Value Ref Range    Vancomycin,trough 23.9 (HH) 5.0 - 10.0 ug/mL    Reported dose date NOT PROVIDED      Reported dose time: NOT PROVIDED      Reported dose: NOT PROVIDED UNITS   METABOLIC PANEL, BASIC    Collection Time: 02/13/22  4:33 AM   Result Value Ref Range    Sodium 141 136 - 145 mmol/L    Potassium 3.3 (L) 3.5 - 5.1 mmol/L    Chloride 112 (H) 97 - 108 mmol/L    CO2 25 21 - 32 mmol/L    Anion gap 4 (L) 5 - 15 mmol/L    Glucose 107 (H) 65 - 100 mg/dL    BUN 7 6 - 20 MG/DL    Creatinine 0.29 (L) 0.70 - 1.30 MG/DL    BUN/Creatinine ratio 24 (H) 12 - 20      GFR est AA >60 >60 ml/min/1.73m2    GFR est non-AA >60 >60 ml/min/1.73m2    Calcium 7.9 (L) 8.5 - 10.1 MG/DL   MAGNESIUM    Collection Time: 02/13/22  4:33 AM   Result Value Ref Range    Magnesium 2.0 1.6 - 2.4 mg/dL   PHOSPHORUS    Collection Time: 02/13/22  4:33 AM   Result Value Ref Range    Phosphorus 2.5 (L) 2.6 - 4.7 MG/DL   CBC WITH AUTOMATED DIFF    Collection Time: 02/13/22  4:33 AM   Result Value Ref Range    WBC 6.6 4.1 - 11.1 K/uL    RBC 3.47 (L) 4.10 - 5.70 M/uL    HGB 8.1 (L) 12.1 - 17.0 g/dL    HCT 26.9 (L) 36.6 - 50.3 %    MCV 77.5 (L) 80.0 - 99.0 FL    MCH 23.3 (L) 26.0 - 34.0 PG    MCHC 30.1 30.0 - 36.5 g/dL RDW 17.7 (H) 11.5 - 14.5 %    PLATELET 335 028 - 085 K/uL    MPV 9.1 8.9 - 12.9 FL    NRBC 0.0 0  WBC    ABSOLUTE NRBC 0.00 0.00 - 0.01 K/uL    NEUTROPHILS 72 32 - 75 %    LYMPHOCYTES 15 12 - 49 %    MONOCYTES 9 5 - 13 %    EOSINOPHILS 3 0 - 7 %    BASOPHILS 0 0 - 1 %    IMMATURE GRANULOCYTES 1 (H) 0.0 - 0.5 %    ABS. NEUTROPHILS 4.8 1.8 - 8.0 K/UL    ABS. LYMPHOCYTES 1.0 0.8 - 3.5 K/UL    ABS. MONOCYTES 0.6 0.0 - 1.0 K/UL    ABS. EOSINOPHILS 0.2 0.0 - 0.4 K/UL    ABS. BASOPHILS 0.0 0.0 - 0.1 K/UL    ABS. IMM.  GRANS. 0.0 0.00 - 0.04 K/UL    DF AUTOMATED     GLUCOSE, POC    Collection Time: 02/13/22  6:38 AM   Result Value Ref Range    Glucose (POC) 123 (H) 65 - 117 mg/dL    Performed by Lino Goss PCT    GLUCOSE, POC    Collection Time: 02/13/22 11:40 AM   Result Value Ref Range    Glucose (POC) 111 65 - 117 mg/dL    Performed by Diid Reusing PCT    GLUCOSE, POC    Collection Time: 02/13/22  4:16 PM   Result Value Ref Range    Glucose (POC) 115 65 - 117 mg/dL    Performed by Didi Reusing PCT    GLUCOSE, POC    Collection Time: 02/13/22  9:18 PM   Result Value Ref Range    Glucose (POC) 99 65 - 117 mg/dL    Performed by [employer] [facility] PCT    METABOLIC PANEL, BASIC    Collection Time: 02/14/22  5:46 AM   Result Value Ref Range    Sodium 141 136 - 145 mmol/L    Potassium 3.5 3.5 - 5.1 mmol/L    Chloride 109 (H) 97 - 108 mmol/L    CO2 27 21 - 32 mmol/L    Anion gap 5 5 - 15 mmol/L    Glucose 97 65 - 100 mg/dL    BUN 4 (L) 6 - 20 MG/DL    Creatinine 0.24 (L) 0.70 - 1.30 MG/DL    BUN/Creatinine ratio 17 12 - 20      GFR est AA >60 >60 ml/min/1.73m2    GFR est non-AA >60 >60 ml/min/1.73m2    Calcium 7.7 (L) 8.5 - 10.1 MG/DL   PHOSPHORUS    Collection Time: 02/14/22  5:46 AM   Result Value Ref Range    Phosphorus 2.2 (L) 2.6 - 4.7 MG/DL   CBC WITH AUTOMATED DIFF    Collection Time: 02/14/22  5:46 AM   Result Value Ref Range    WBC 9.8 4.1 - 11.1 K/uL    RBC 3.72 (L) 4.10 - 5.70 M/uL    HGB 8.5 (L) 12.1 - 17.0 g/dL    HCT 29.2 (L) 36.6 - 50.3 %    MCV 78.5 (L) 80.0 - 99.0 FL    MCH 22.8 (L) 26.0 - 34.0 PG    MCHC 29.1 (L) 30.0 - 36.5 g/dL    RDW 17.8 (H) 11.5 - 14.5 %    PLATELET 719 472 - 427 K/uL    MPV 9.8 8.9 - 12.9 FL    NRBC 0.0 0  WBC    ABSOLUTE NRBC 0.00 0.00 - 0.01 K/uL    NEUTROPHILS 77 (H) 32 - 75 %    LYMPHOCYTES 11 (L) 12 - 49 %    MONOCYTES 9 5 - 13 %    EOSINOPHILS 2 0 - 7 %    BASOPHILS 0 0 - 1 %    IMMATURE GRANULOCYTES 1 (H) 0.0 - 0.5 %    ABS. NEUTROPHILS 7.7 1.8 - 8.0 K/UL    ABS. LYMPHOCYTES 1.1 0.8 - 3.5 K/UL    ABS. MONOCYTES 0.8 0.0 - 1.0 K/UL    ABS. EOSINOPHILS 0.2 0.0 - 0.4 K/UL    ABS. BASOPHILS 0.0 0.0 - 0.1 K/UL    ABS. IMM.  GRANS. 0.1 (H) 0.00 - 0.04 K/UL    DF AUTOMATED     GLUCOSE, POC    Collection Time: 02/14/22  7:01 AM   Result Value Ref Range    Glucose (POC) 104 65 - 117 mg/dL    Performed by [facility] PCT        Data Review images and reports reviewed

## 2022-02-14 NOTE — PROGRESS NOTES
TRANSFER - OUT REPORT:    Verbal report given to ORAL huertas (name) on Sharmila Im  being transferred to [facility](unit) for routine progression of care       Report consisted of patients Situation, Background, Assessment and   Recommendations(SBAR). Information from the following report(s) Intake/Output, MAR and Accordion was reviewed with the receiving nurse. Lines:   Peripheral IV 02/09/22 Posterior;Right Forearm (Active)   Site Assessment Clean, dry, & intact 02/14/22 0807   Phlebitis Assessment 0 02/14/22 0807   Infiltration Assessment 0 02/14/22 0807   Dressing Status Clean, dry, & intact 02/14/22 0807   Dressing Type Transparent 02/14/22 0807   Hub Color/Line Status Pink 02/14/22 0807   Action Taken Open ports on tubing capped 02/13/22 1504   Alcohol Cap Used Yes 02/14/22 0807       Peripheral IV 02/10/22 Left Wrist (Active)   Site Assessment Clean, dry, & intact 02/14/22 0807   Phlebitis Assessment 0 02/14/22 0807   Infiltration Assessment 0 02/14/22 0807   Dressing Status Clean, dry, & intact 02/14/22 0807   Dressing Type Transparent 02/14/22 0807   Hub Color/Line Status Pink 02/14/22 2608   Action Taken Open ports on tubing capped 02/13/22 1504   Alcohol Cap Used Yes 02/14/22 7615        Opportunity for questions and clarification was provided.       Patient transported with:   O2 @ 2 liters  Patient-specific medications from Pharmacy  Registered Nurse  Tech

## 2022-02-14 NOTE — PROGRESS NOTES
Infectious Disease progress      IMPRESSION:     -Sepsis    -Bacteremia with strep agalactiae sero Gp B Strep 1/2 ,P.mrabilis 2nd of 2  BC- 2/9 -Strep agalactiae Gp B beta hemolytic  1/2, P.mirabilis 2nd of 2 bottles. -Infected sacral & ischial ulcers   Wound cultures-2/9-light P.mirabilis, light strep Gp B beta hemolytic, light alpha strep, moderate Bacteroides uniformis, beta lactamase negative.    -Chronic OM of sacrococcygeal junction,acute OM ischial tuberosities. CT scan-Deep soft tissue ulcerations extending to the bilateral ischial tuberosities  with associated acute osteomyelitis. Chronic osteomyelitis involving the sacrococcygeal junction with overlying  soft tissue ulceration and abscess. Bilateral femoral head avascular necrosis with subtle subchondral collapse. S/p local debridement at bedside by Gen Surgery today 2/14. -UTI with P.mirabilis   indwelling mauro, neurogenic bladder  UC + for >100,000 P.mirabilis. -Severe MS   bedbound, functional quadriplegia     -Dysphagia    -Chronic cough    -Severe protein calory malnutrition    -Elevated A1c 6.6 ? Diabetes mellitus    -ESR 73, CRP11.2         PLAN:        -Change to Unasyn iv   -Pharmacy on consult for dosing, monitor Cr  -Wound Care per Wound care, Surgery recommendations.  -Blood sugar control.  -Aspiration precautions       Patient seen. Moaning, in pain. D/w Dr Alexander Wright, niece of pt. Topher Evans is a 80year old male with history DM, MS, Stroke who presented on 2/9 with sealock wounds on sacrum and ischium. He has bilaterally with  fever. Patient is a resident of SNF, found to have fever and tachycardia on morning of admission with worsening of his chronic wounds to sacrum, heels. Patient sating 90% on room air, placed on o2. Per ED note he was  oriented to self, stable neuro exam at time of admission. Patient tachycardic to 126, /70, fecbrile to 101.6 en route. Unknown vaccination status.  Patient reports pain to his bottom which has been worsening over the past week approximately. When speaking to his niece Brooke Cohen who is NP with orthopedics, I have learned that the best been dealing with chronic sacral wounds, sacral osteomyelitis. Usually receives treatment at the [address] [place] [address]. Patient has developed newly skin wounds bilaterally which have been progressing over the past month. Patient has diagnosis of chronic  osteomyelitis of sacrum, recurrent UTI, pneumonia. Right ankle wound has been present for 1 year. CT pelvis-2/9   As follows  FINDINGS:  Bowel: Partially imaged, within normal limits.     Reproductive organs: Within normal limits.     Soft tissues: No pelvic mass or lymphadenopathy.     Fluid: No ascites or drainable fluid collection     Bones: Chronic appearing erosive changes and fragmentation at the sacrococcygeal  junction. Approximately 6.2 cm x 0.8 cm x 10.7 cm amorphous thick-walled mottled  fluid and gas collection in the presacral soft tissues with overlying skin  ulceration, likely an abscess. Deep soft tissue ulceration extending to the  bilateral ischial tuberosities. No aggressive patchy osteolysis at ischial  tuberosities concerning for acute osteoarthritis. Degenerative changes in the  spine. Bilateral femoral head avascular necrosis with subtle subchondral  collapse     Miscellaneous: Atherosclerosis. Bladder decompressed with Guthrie in place     IMPRESSION  1. Deep soft tissue ulcerations extending to the bilateral ischial tuberosities  with associated acute osteomyelitis. 2.  Chronic osteomyelitis involving the sacrococcygeal junction with overlying  soft tissue ulceration and abscess. 3.  Bilateral femoral head avascular necrosis with subtle subchondral collapse    D/w wound care. Also RN regarding current status of wounds. Currently attending clean. Wound cultures done on 2/9+ for light GNR, multiple strep species. Blood culture-2/9+ for group B beta-hemolytic strep 1/2, GNR 2nd of tuberculous.  Urine culture- 2/9 ->100,000 GNR. Patient has been febrile. T-max as follows  2/.2, 2/10- 102.8, 2/9-101.6. Patient seen today. He appears comfortable. Denies complaints at this time. Discussed with patient's niece and RN. Patient Active Problem List   Diagnosis Code    Sepsis ([facility] A41.9    Sacral pressure ulcer L89.159     Past Medical History:   Diagnosis Date    MS (multiple sclerosis) ([facility]     Stroke ([facility]       No family history on file. Social History     Tobacco Use    Smoking status: Never Smoker    Smokeless tobacco: Not on file   Substance Use Topics    Alcohol use: No     Past Surgical History:   Procedure Laterality Date    HX CORONARY STENT PLACEMENT      x7       Prior to Admission medications    Medication Sig Start Date End Date Taking? Authorizing Provider   acetaminophen (TYLENOL) 500 mg tablet Take 1,000 mg by mouth every eight (8) hours as needed (moderate pain). Yes Provider, Historical   ascorbic acid, vitamin C, (VITAMIN C) 500 mg tablet Take 500 mg by mouth daily. Yes Provider, Historical   aspirin delayed-release 81 mg tablet Take 81 mg by mouth daily. Yes Provider, Historical   bisacodyL (DULCOLAX) 10 mg supp Insert 10 mg into rectum daily as needed for Constipation. Yes Provider, Historical   cholecalciferol (VITAMIN D3) (2,000 UNITS /50 MCG) cap capsule Take 2,000 Units by mouth daily. Yes Provider, Historical   cyanocobalamin (VITAMIN B12) 100 mcg tablet Take 200 mcg by mouth daily. Yes Provider, Historical   docusate sodium (COLACE) 100 mg capsule Take 100 mg by mouth nightly. Yes Provider, Historical   ferrous sulfate 325 mg (65 mg iron) tablet Take 325 mg by mouth every other day. Yes Provider, Historical   folic acid (FOLVITE) 1 mg tablet Take 2 mg by mouth daily. Yes Provider, Historical   glatiramer (COPAXONE) 40 mg/mL syrg 40 mg by SubCUTAneous route every Monday, Wednesday, Friday.    Yes Provider, Historical   polyethylene glycol (Miralax) 17 gram packet Take 17 g by mouth daily. Yes Provider, Historical   guaiFENesin (ORGANIDIN) 200 mg tablet Take 400 mg by mouth every eight (8) hours as needed for Congestion. Yes Provider, Historical   albuterol-ipratropium (DUO-NEB) 2.5 mg-0.5 mg/3 ml nebu 3 mL by Nebulization route every four (4) hours as needed for Wheezing or Shortness of Breath. Yes Provider, Historical   lidocaine-vit e-aloe vera-wayne 2 % topical gel Apply 1 Each to affected area Every Thursday. Apply to ischiums and right ankle prior to wound care visit   Yes Provider, Historical   lidocaine-vit e-aloe vera-wayne 2 % topical gel Apply 1 Each to affected area Every Thursday. Apply to sacrum prior to wound care   Yes Provider, Historical   dronabinoL (MARINOL) 2.5 mg capsule Take 2.5 mg by mouth two (2) times a day. Yes Provider, Historical   mirtazapine (REMERON) 30 mg tablet Take  by mouth nightly. Yes Provider, Historical   therapeutic multivitamin (THERAGRAN) tablet Take 1 Tablet by mouth daily. Yes Provider, Historical   insulin aspart U-100 (NOVOLOG) 100 unit/mL injection by SubCUTAneous route Before breakfast, lunch, and dinner. 150-200 = 2 units  201-250 = 4 units  251-300 = 5 units  301-350 = 7 units  351-400 = 9 units  401+ = 10 units   Yes Provider, Historical   omeprazole (PRILOSEC) 40 mg capsule Take 40 mg by mouth daily. Yes Provider, Historical   oxyCODONE IR (ROXICODONE) 5 mg immediate release tablet Take 5 mg by mouth every four (4) hours as needed for Pain. Yes Provider, Historical   senna (SENOKOT) 8.6 mg tablet Take 2 Tablets by mouth nightly. Yes Provider, Historical   sertraline (ZOLOFT) 25 mg tablet Take 25 mg by mouth daily. Yes Provider, Historical   zinc sulfate (Zinc-220) 50 mg zinc (220 mg) capsule Take 1 Capsule by mouth every other day.    Yes Provider, Historical     Allergies   Allergen Reactions    Beeswax Anaphylaxis    Bee Pollen Unknown (comments)    Simvastatin Not Reported This Time    Tolterodine Not Reported This Time    Venom-Wasp Not Reported This Time        Review of Systems:  A comprehensive review of systems was negative except for that written in the History of Present Illness. 14 point review of systems obtained . All other systems negative    Objective:   Blood pressure (!) 117/59, pulse 87, temperature 98.9 °F (37.2 °C), resp. rate 18, height 6' 1\" (1.854 m), weight 154 lb (69.9 kg), SpO2 91 %.   Temp (24hrs), Av.4 °F (37.4 °C), Min:98.6 °F (37 °C), Max:100.9 °F (38.3 °C)    Current Facility-Administered Medications   Medication Dose Route Frequency    cefTRIAXone (ROCEPHIN) 2 g in 0.9% sodium chloride (MBP/ADV) 50 mL MBP  2 g IntraVENous Q24H    morphine injection 2 mg  2 mg IntraVENous Q4H PRN    amiodarone (CORDARONE) 375 mg/250 mL D5W infusion  0.5 mg/min IntraVENous CONTINUOUS    midodrine (PROAMATINE) tablet 5 mg  5 mg Oral TID WITH MEALS    sodium hypochlorite (HALF STRENGTH DAKIN'S) 0.25% irrigation (bottle)   Topical BID    metroNIDAZOLE (FLAGYL) IVPB premix 500 mg  500 mg IntraVENous Q12H    [Held by provider] metoprolol tartrate (LOPRESSOR) tablet 12.5 mg  12.5 mg Oral Q12H    balsam peru-castor oiL (VENELEX) ointment   Topical BID    ferrous sulfate tablet 325 mg  325 mg Oral EVERY OTHER DAY    lidocaine/EPINEPHrine/tetracaine (L.E.T) topical gel  3 mL Topical Q12H    digoxin (LANOXIN) tablet 0.125 mg  0.125 mg Oral DAILY    dextrose 10% infusion 125-250 mL  125-250 mL IntraVENous PRN    oxyCODONE IR (ROXICODONE) tablet 5-10 mg  5-10 mg Oral Q4H PRN    sodium chloride (NS) flush 5-10 mL  5-10 mL IntraVENous PRN    acetaminophen (TYLENOL) tablet 650 mg  650 mg Oral Q6H PRN    ondansetron (ZOFRAN) injection 4 mg  4 mg IntraVENous Q6H PRN    aspirin chewable tablet 81 mg  81 mg Oral DAILY    albuterol-ipratropium (DUO-NEB) 2.5 MG-0.5 MG/3 ML  3 mL Nebulization Q4H PRN    guaiFENesin (ROBITUSSIN) 100 mg/5 mL oral liquid 200 mg  200 mg Oral Q6H PRN    dronabinoL (MARINOL) capsule 2.5 mg  2.5 mg Oral ACB&D    mirtazapine (REMERON) tablet 30 mg  30 mg Oral QHS    insulin lispro (HUMALOG) injection   SubCUTAneous AC&HS    glucose chewable tablet 16 g  4 Tablet Oral PRN    glucagon (GLUCAGEN) injection 1 mg  1 mg IntraMUSCular PRN    pantoprazole (PROTONIX) tablet 40 mg  40 mg Oral ACB    senna-docusate (PERICOLACE) 8.6-50 mg per tablet 2 Tablet  2 Tablet Oral QHS    sertraline (ZOLOFT) tablet 25 mg  25 mg Oral DAILY    glatiramer (COPAXONE) injection 20 mg  20 mg SubCUTAneous Q MON, WED & FRI    0.9% sodium chloride infusion  125 mL/hr IntraVENous CONTINUOUS    L.acidophilus-paracasei-S.thermophil-bifidobacter (RISAQUAD) 8 billion cell capsule  1 Capsule Oral DAILY    enoxaparin (LOVENOX) injection 40 mg  40 mg SubCUTAneous DAILY        Exam:    General:  Awake, cooperative,contracted   Eyes:  Sclera anicteric. Pupils equally round and reactive to light. Mouth/Throat: Mucous membranes normal, oral pharynx clear   Neck: Supple   Lungs:   Clear to auscultation bilaterally, good effort   CV:  Regular rate and rhythm,no murmur, click, rub or gallop   Abdomen:   Soft, non-tender.  bowel sounds normal. non-distended   Extremities: No  edema   Skin: Skin color, texture, turgor normal. no acute rash or lesions   Lymph nodes: Cervical and supraclavicular normal   Musculoskeletal: No swelling or deformity   Lines/Devices:  Intact, no erythema, drainage or tenderness   Psych: Alert and oriented, normal mood affect        Data Reviewed:   CBC:   Recent Labs     02/14/22  0546 02/13/22  0433 02/12/22  0342   WBC 9.8 6.6 8.6   RBC 3.72* 3.47* 3.61*   HGB 8.5* 8.1* 8.5*   HCT 29.2* 26.9* 29.0*    272 313   GRANS 77* 72 82*   LYMPH 11* 15 9*   EOS 2 3 0     CMP:   Recent Labs     02/14/22  0546 02/13/22  0433 02/12/22  0342   GLU 97 107* 121*    141 139   K 3.5 3.3* 3.6   * 112* 109*   CO2 27 25 24   BUN 4* 7 10   CREA 0.24* 0.29* 0.32*   CA 7.7* 7.9* 8.2*   AGAP 5 4* 6   BUCR 17 24* 31*       Lab Results   Component Value Date/Time    Culture result: NO GROWTH 2 DAYS 02/12/2022 03:42 AM    Culture result: NO GROWTH 3 DAYS 02/11/2022 11:04 AM    Culture result: LIGHT PROTEUS MIRABILIS (A) 02/10/2022 04:00 AM    Culture result: (A) 02/10/2022 04:00 AM     LIGHT STREPTOCOCCI, BETA HEMOLYTIC GROUP B Penicillin and ampicillin are drugs of choice for treatment of beta-hemolytic streptococcal infections. Susceptibility testing of penicillins and beta-lactams approved by the FDA for treatment of beta-hemolytic streptococcal infections need not be performed routinely, because nonsusceptible isolates are extremely rare. CLSI 2012    Culture result: LIGHT ALPHA STREPTOCOCCUS (A) 02/10/2022 04:00 AM    Culture result: (A) 02/10/2022 04:00 AM     MODERATE BACTEROIDES UNIFORMIS BETA LACTAMASE NEGATIVE          XR Results (most recent)reviewed:  Results from [place] encounter on 02/09/22    XR CHEST PORT    Narrative  EXAM:  XR CHEST PORT    INDICATION: Eval for infiltrate    COMPARISON: Chest radiographs 7/20/2016    TECHNIQUE: Semiupright portable chest AP view    FINDINGS:    Cardiac silhouette within normal limits. Aorta is tortuous. Several irregular  linear opacities in the right mid and upper lung, again may reflect fibrosis. No  focal consolidation. No definite pleural effusion or pneumothorax. Impression  No consolidative pneumonia. Redemonstrated suspected right lung  fibrotic changes. ICD-10-CM ICD-9-CM    1. Cellulitis of buttock  L03.317 682.5    2. Acute osteomyelitis of pelvic region, unspecified laterality ([facility]  M86.159 730.05    3. Catheter cystitis, initial encounter ([facility]  T83.518A 996.64     N30.90 595.89    4. Chronic cough  R05.3 786.2    5. Esophageal dysphagia  R13.19 787.29    6. Functional quadriplegia (HCC)  R53.2 780.72    7. Gram-negative bacteremia  R78.81 790.7      041.85    8.  Gram-negative infection  A49.9 041.85 9. Multiple sclerosis ([facility]  G35 340    10. Neurogenic bladder  N31.9 596.54    11. Sacral osteomyelitis ([facility])  M46.28 730.28    12. Streptococcal bacteremia  R78.81 790.7     B95.5 041.00    13. Pressure injury of sacral region, stage 4 ([facility])  L89.154 707.03      707.24            Antibiotic History  Ceftriaxone, Flagyl  S/p vancomycin, cefepime, Flagyl   I have discussed the diagnosis with the patient and niece & the intended plan as seen in the above orders. I have discussed medication side effects and warnings with the patient & niece. Reviewed test results at length with patient & niece.     Signed By: Clarence Reyes MD FACP

## 2022-02-14 NOTE — PROGRESS NOTES
1519: TRANSFER - IN REPORT:    Verbal report received from [facility], [address] (name) on Emily Cruz  being received from Ortho (unit) for routine progression of care      Report consisted of patients Situation, Background, Assessment and   Recommendations(SBAR). Information from the following report(s) SBAR, Kardex, Intake/Output, MAR and Recent Results was reviewed with the receiving nurse. Opportunity for questions and clarification was provided. Assessment completed upon patients arrival to unit and care assumed. 1540: Patient arrived on the floor. O2 sats 79% on RA. 6LNC applied. O2 sats 93%. No signs of respiratory distress. Dual skin completed with Marie Matos, nursing director. All foam dressing pulled back and examined. 1609: Amio gtt d/c per Dr. Alexy Qiu and transitioned to PO amio. 1900: End of Shift Note    Bedside shift change report given to ORAL Montgomery (oncoming nurse) by Roberto Garcia RN (offgoing nurse).   Report included the following information SBAR, Kardex, Intake/Output, MAR, Recent Results and Cardiac Rhythm NSR    Shift worked:  4p-7p     Shift summary and any significant changes:    Amio gtt d/c- PO amio added   Concerns for physician to address: See above   Zone phone for oncoming shift:  [phone]     Activity:  Activity Level: Bed Rest  Number times ambulated in hallways past shift: 0  Number of times OOB to chair past shift: 0    Cardiac:   Cardiac Monitoring: Yes      Cardiac Rhythm: Sinus Rhythm    Access:   Current line(s): PIV     Genitourinary:   Urinary status: mauro    Respiratory:   O2 Device: Nasal cannula  Chronic home O2 use?: NO  Incentive spirometer at bedside: YES     GI:  Last Bowel Movement Date: 02/12/22  Current diet:  ADULT DIET Regular  ADULT ORAL NUTRITION SUPPLEMENT Breakfast, Dinner; Renal Supplement  Passing flatus: YES  Tolerating current diet: YES       Pain Management:   Patient states pain is manageable on current regimen: YES    Skin:  Lewis Score: 10  Interventions: turn team, speciality bed, float heels, foam dressing, limit briefs and internal/external urinary devices    Patient Safety:  Fall Score:  Total Score: 3  Interventions: bed/chair alarm  High Fall Risk: Yes    Length of Stay:  Expected LOS: - - -  Actual LOS: 5      Alden Nieto, RN

## 2022-02-14 NOTE — PROGRESS NOTES
Spiritual Care Assessment/Progress Note  [facility]      NAME: Rafaela Giles      MRN: [id number]  AGE: 80 y.o. SEX: male  Hindu Affiliation: Unknown   Language: English     2/14/2022     Total Time (in minutes): 6     Spiritual Assessment begun in [facility] through conversation with:         []Patient        [] Family    [] Friend(s)        Reason for Consult: Initial/Spiritual assessment, patient floor     Spiritual beliefs: (Please include comment if needed)     [] Identifies with a jet tradition:         [] Supported by a jet community:            [] Claims no spiritual orientation:           [] Seeking spiritual identity:                [] Adheres to an individual form of spirituality:           [x] Not able to assess:                           Identified resources for coping:      [] Prayer                               [] Music                  [] Guided Imagery     [] Family/friends                 [] Pet visits     [] Devotional reading                         [] Unknown     [] Other:                                             Interventions offered during this visit: (See comments for more details)    Patient Interventions: Initial visit           Plan of Care:     [] Support spiritual and/or cultural needs    [] Support AMD and/or advance care planning process      [] Support grieving process   [] Coordinate Rites and/or Rituals    [] Coordination with community clergy   [] No spiritual needs identified at this time   [] Detailed Plan of Care below (See Comments)  [] Make referral to Music Therapy  [] Make referral to Pet Therapy     [] Make referral to Addiction services  [] Make referral to [facility]  [] Make referral to Spiritual Care Partner  [] No future visits requested        [x] Contact Spiritual Care for further referrals     Comments:     Initial Spiritual Care Assessment attempt for Mr. Sonia Patterson in [phone]. Performed chart review before the visit.   Upon arrival, patient was appeared sleeping.  respected patient's privacy. Contact  if emotional/spiritual needs arise.      [id number] [facility] Blair Sandoval,VinceM, [address] Provider   Paging Service [phone])

## 2022-02-15 LAB
ANION GAP SERPL CALC-SCNC: 2 MMOL/L (ref 5–15)
BACTERIA SPEC CULT: NORMAL
BASOPHILS # BLD: 0 K/UL (ref 0–0.1)
BASOPHILS NFR BLD: 0 % (ref 0–1)
BUN SERPL-MCNC: 3 MG/DL (ref 6–20)
BUN/CREAT SERPL: 13 (ref 12–20)
CALCIUM SERPL-MCNC: 7.5 MG/DL (ref 8.5–10.1)
CHLORIDE SERPL-SCNC: 108 MMOL/L (ref 97–108)
CO2 SERPL-SCNC: 30 MMOL/L (ref 21–32)
CREAT SERPL-MCNC: 0.24 MG/DL (ref 0.7–1.3)
DIFFERENTIAL METHOD BLD: ABNORMAL
EOSINOPHIL # BLD: 0.2 K/UL (ref 0–0.4)
EOSINOPHIL NFR BLD: 2 % (ref 0–7)
ERYTHROCYTE [DISTWIDTH] IN BLOOD BY AUTOMATED COUNT: 17.6 % (ref 11.5–14.5)
GLUCOSE BLD STRIP.AUTO-MCNC: 101 MG/DL (ref 65–117)
GLUCOSE BLD STRIP.AUTO-MCNC: 101 MG/DL (ref 65–117)
GLUCOSE BLD STRIP.AUTO-MCNC: 115 MG/DL (ref 65–117)
GLUCOSE BLD STRIP.AUTO-MCNC: 148 MG/DL (ref 65–117)
GLUCOSE SERPL-MCNC: 100 MG/DL (ref 65–100)
HCT VFR BLD AUTO: 29.8 % (ref 36.6–50.3)
HGB BLD-MCNC: 8.7 G/DL (ref 12.1–17)
IMM GRANULOCYTES # BLD AUTO: 0 K/UL (ref 0–0.04)
IMM GRANULOCYTES NFR BLD AUTO: 0 % (ref 0–0.5)
LYMPHOCYTES # BLD: 1.2 K/UL (ref 0.8–3.5)
LYMPHOCYTES NFR BLD: 13 % (ref 12–49)
MAGNESIUM SERPL-MCNC: 2.1 MG/DL (ref 1.6–2.4)
MCH RBC QN AUTO: 23.4 PG (ref 26–34)
MCHC RBC AUTO-ENTMCNC: 29.2 G/DL (ref 30–36.5)
MCV RBC AUTO: 80.1 FL (ref 80–99)
MONOCYTES # BLD: 0.9 K/UL (ref 0–1)
MONOCYTES NFR BLD: 10 % (ref 5–13)
NEUTS SEG # BLD: 6.9 K/UL (ref 1.8–8)
NEUTS SEG NFR BLD: 75 % (ref 32–75)
NRBC # BLD: 0 K/UL (ref 0–0.01)
NRBC BLD-RTO: 0 PER 100 WBC
PHOSPHATE SERPL-MCNC: 2.3 MG/DL (ref 2.6–4.7)
PLATELET # BLD AUTO: 354 K/UL (ref 150–400)
PMV BLD AUTO: 9.2 FL (ref 8.9–12.9)
POTASSIUM SERPL-SCNC: 3.3 MMOL/L (ref 3.5–5.1)
RBC # BLD AUTO: 3.72 M/UL (ref 4.1–5.7)
SERVICE CMNT-IMP: ABNORMAL
SERVICE CMNT-IMP: NORMAL
SODIUM SERPL-SCNC: 140 MMOL/L (ref 136–145)
WBC # BLD AUTO: 9.2 K/UL (ref 4.1–11.1)

## 2022-02-15 PROCEDURE — 99231 SBSQ HOSP IP/OBS SF/LOW 25: CPT | Performed by: SURGERY

## 2022-02-15 PROCEDURE — 74011250636 HC RX REV CODE- 250/636: Performed by: HOSPITALIST

## 2022-02-15 PROCEDURE — 84100 ASSAY OF PHOSPHORUS: CPT

## 2022-02-15 PROCEDURE — 82962 GLUCOSE BLOOD TEST: CPT

## 2022-02-15 PROCEDURE — 74011000250 HC RX REV CODE- 250: Performed by: INTERNAL MEDICINE

## 2022-02-15 PROCEDURE — 36415 COLL VENOUS BLD VENIPUNCTURE: CPT

## 2022-02-15 PROCEDURE — 74011250637 HC RX REV CODE- 250/637: Performed by: NURSE PRACTITIONER

## 2022-02-15 PROCEDURE — 85025 COMPLETE CBC W/AUTO DIFF WBC: CPT

## 2022-02-15 PROCEDURE — 65660000000 HC RM CCU STEPDOWN

## 2022-02-15 PROCEDURE — 77010033678 HC OXYGEN DAILY

## 2022-02-15 PROCEDURE — 74011636637 HC RX REV CODE- 636/637: Performed by: HOSPITALIST

## 2022-02-15 PROCEDURE — 83735 ASSAY OF MAGNESIUM: CPT

## 2022-02-15 PROCEDURE — 77030040392 HC DRSG OPTIFOAM MDII -A

## 2022-02-15 PROCEDURE — 74011000258 HC RX REV CODE- 258: Performed by: INTERNAL MEDICINE

## 2022-02-15 PROCEDURE — 99233 SBSQ HOSP IP/OBS HIGH 50: CPT | Performed by: INTERNAL MEDICINE

## 2022-02-15 PROCEDURE — 80048 BASIC METABOLIC PNL TOTAL CA: CPT

## 2022-02-15 PROCEDURE — 74011250636 HC RX REV CODE- 250/636: Performed by: INTERNAL MEDICINE

## 2022-02-15 PROCEDURE — 74011250637 HC RX REV CODE- 250/637: Performed by: INTERNAL MEDICINE

## 2022-02-15 PROCEDURE — 74011250637 HC RX REV CODE- 250/637: Performed by: HOSPITALIST

## 2022-02-15 PROCEDURE — 74011250636 HC RX REV CODE- 250/636: Performed by: NURSE PRACTITIONER

## 2022-02-15 PROCEDURE — 99232 SBSQ HOSP IP/OBS MODERATE 35: CPT | Performed by: NURSE PRACTITIONER

## 2022-02-15 PROCEDURE — 2709999900 HC NON-CHARGEABLE SUPPLY

## 2022-02-15 RX ORDER — POTASSIUM CHLORIDE 20 MEQ/1
40 TABLET, EXTENDED RELEASE ORAL
Status: DISCONTINUED | OUTPATIENT
Start: 2022-02-15 | End: 2022-02-15

## 2022-02-15 RX ADMIN — Medication 1 CAPSULE: at 09:47

## 2022-02-15 RX ADMIN — OXYCODONE 5 MG: 5 TABLET ORAL at 20:20

## 2022-02-15 RX ADMIN — ENOXAPARIN SODIUM 40 MG: 100 INJECTION SUBCUTANEOUS at 09:49

## 2022-02-15 RX ADMIN — DOCUSATE SODIUM 50 MG AND SENNOSIDES 8.6 MG 2 TABLET: 8.6; 5 TABLET, FILM COATED ORAL at 22:26

## 2022-02-15 RX ADMIN — ASPIRIN 81 MG CHEWABLE TABLET 81 MG: 81 TABLET CHEWABLE at 09:47

## 2022-02-15 RX ADMIN — SERTRALINE 25 MG: 50 TABLET, FILM COATED ORAL at 09:47

## 2022-02-15 RX ADMIN — OXYCODONE 5 MG: 5 TABLET ORAL at 16:38

## 2022-02-15 RX ADMIN — Medication 2 UNITS: at 16:51

## 2022-02-15 RX ADMIN — POTASSIUM BICARBONATE 40 MEQ: 782 TABLET, EFFERVESCENT ORAL at 10:13

## 2022-02-15 RX ADMIN — CASTOR OIL AND BALSAM, PERU: 788; 87 OINTMENT TOPICAL at 09:48

## 2022-02-15 RX ADMIN — SODIUM CHLORIDE 125 ML/HR: 9 INJECTION, SOLUTION INTRAVENOUS at 00:35

## 2022-02-15 RX ADMIN — DRONABINOL 2.5 MG: 2.5 CAPSULE ORAL at 09:46

## 2022-02-15 RX ADMIN — AMPICILLIN SODIUM AND SULBACTAM SODIUM 3 G: 2; 1 INJECTION, POWDER, FOR SOLUTION INTRAMUSCULAR; INTRAVENOUS at 16:38

## 2022-02-15 RX ADMIN — AMIODARONE HYDROCHLORIDE 400 MG: 200 TABLET ORAL at 09:47

## 2022-02-15 RX ADMIN — MIDODRINE HYDROCHLORIDE 5 MG: 5 TABLET ORAL at 09:47

## 2022-02-15 RX ADMIN — MIDODRINE HYDROCHLORIDE 5 MG: 5 TABLET ORAL at 16:38

## 2022-02-15 RX ADMIN — Medication 3 ML: at 20:22

## 2022-02-15 RX ADMIN — AMPICILLIN SODIUM AND SULBACTAM SODIUM 3 G: 2; 1 INJECTION, POWDER, FOR SOLUTION INTRAMUSCULAR; INTRAVENOUS at 04:09

## 2022-02-15 RX ADMIN — CASTOR OIL AND BALSAM, PERU: 788; 87 OINTMENT TOPICAL at 20:21

## 2022-02-15 RX ADMIN — PANTOPRAZOLE SODIUM 40 MG: 40 TABLET, DELAYED RELEASE ORAL at 09:47

## 2022-02-15 RX ADMIN — DRONABINOL 2.5 MG: 2.5 CAPSULE ORAL at 16:38

## 2022-02-15 RX ADMIN — MIRTAZAPINE 30 MG: 15 TABLET, FILM COATED ORAL at 22:26

## 2022-02-15 RX ADMIN — MIDODRINE HYDROCHLORIDE 5 MG: 5 TABLET ORAL at 12:09

## 2022-02-15 RX ADMIN — AMIODARONE HYDROCHLORIDE 400 MG: 200 TABLET ORAL at 17:19

## 2022-02-15 RX ADMIN — ACETAMINOPHEN 1000 MG: 500 TABLET ORAL at 16:38

## 2022-02-15 RX ADMIN — Medication 3 ML: at 09:50

## 2022-02-15 RX ADMIN — FERROUS SULFATE TAB 325 MG (65 MG ELEMENTAL FE) 325 MG: 325 (65 FE) TAB at 16:43

## 2022-02-15 RX ADMIN — OXYCODONE 5 MG: 5 TABLET ORAL at 11:08

## 2022-02-15 RX ADMIN — ACETAMINOPHEN 1000 MG: 500 TABLET ORAL at 09:47

## 2022-02-15 RX ADMIN — HYOSCYAMINE SULFATE: 16 SOLUTION at 09:50

## 2022-02-15 RX ADMIN — ACETAMINOPHEN 1000 MG: 500 TABLET ORAL at 23:50

## 2022-02-15 RX ADMIN — OXYCODONE 5 MG: 5 TABLET ORAL at 04:10

## 2022-02-15 RX ADMIN — AMPICILLIN SODIUM AND SULBACTAM SODIUM 3 G: 2; 1 INJECTION, POWDER, FOR SOLUTION INTRAMUSCULAR; INTRAVENOUS at 22:27

## 2022-02-15 RX ADMIN — MORPHINE SULFATE 2 MG: 2 INJECTION, SOLUTION INTRAMUSCULAR; INTRAVENOUS at 17:23

## 2022-02-15 RX ADMIN — DIGOXIN 0.12 MG: 125 TABLET ORAL at 09:47

## 2022-02-15 RX ADMIN — AMPICILLIN SODIUM AND SULBACTAM SODIUM 3 G: 2; 1 INJECTION, POWDER, FOR SOLUTION INTRAMUSCULAR; INTRAVENOUS at 10:13

## 2022-02-15 NOTE — PROGRESS NOTES
Bedside shift change report given to Torsten Ann RN (oncoming nurse) by Jack Tomas RN (offgoing nurse). Report included the following information SBAR, Kardex, Intake/Output, MAR, Recent Results and Cardiac Rhythm NSR.     1808: PRN morphine given prior to wound care. Daily sacral and R ankle wound care completed with Veronica Amor wound care RN. 1900: End of Shift Note    Bedside shift change report given to ORAL Berger (oncoming nurse) by Lucien Rene RN (offgoing nurse). Report included the following information SBAR, Kardex, Intake/Output, MAR, Recent Results and Cardiac Rhythm NSR    Shift worked:  7a-7p     Shift summary and any significant changes:    Wound care completed- both sacral and R ankle are now daily see wound care orders    Poor appetite    Plan: Echo/BRITANY on Friday NPO @ [facility] 2/18   Concerns for physician to address:  see above     Zone phone for oncoming shift:   [phone]       Activity:  Activity Level: Bed Rest  Number times ambulated in hallways past shift: 0  Number of times OOB to chair past shift: 0    Cardiac:   Cardiac Monitoring: Yes      Cardiac Rhythm: Sinus Rhythm    Access:   Current line(s): PIV     Genitourinary:   Urinary status: mauro    Respiratory:   O2 Device: Nasal cannula  Chronic home O2 use?: NO  Incentive spirometer at bedside: YES     GI:  Last Bowel Movement Date: 02/12/22  Current diet:  ADULT DIET Regular  ADULT ORAL NUTRITION SUPPLEMENT Breakfast, Dinner; Renal Supplement  DIET NPO  Passing flatus: YES  Tolerating current diet: YES       Pain Management:   Patient states pain is manageable on current regimen: YES    Skin:  Lewis Score: 10  Interventions: speciality bed, float heels, foam dressing, limit briefs and internal/external urinary devices    Patient Safety:  Fall Score:  Total Score: 3  Interventions: bed/chair alarm  High Fall Risk: Yes    Length of Stay:  Expected LOS: 4d 19h  Actual LOS: 6      Lucien Rene RN

## 2022-02-15 NOTE — PROGRESS NOTES
Hospitalist Progress Note    NAME: Milvia Cordero   :  1950   MRN:  [id number]       Assessment / Plan:  Sepsis (tachycardia and fever) due to infected sacral and left ischium pressure ulcers with history of chronic osteomyelitis  CT scan showed 1. Deep soft tissue ulcerations extending to the bilateral ischial tuberosities  with associated acute osteomyelitis. 2.  Chronic osteomyelitis involving the sacrococcygeal junction with overlying  soft tissue ulceration and abscess. 3.  Bilateral femoral head avascular necrosis with subtle subchondral collapse    Bacteremia with strep agalactiae sero Gp B Strep 1/2 ,P.mrabilis 2nd of 2  BC-  -Strep agalactiae Gp B beta hemolytic  , P.mirabilis 2nd of 2 bottles  Wound cultures--light P.mirabilis, light strep Gp B beta hemolytic, light alpha strep, moderate Bacteroides uniformis, beta lactamase negative. ECHO showed There is flail anterior mitral valve leaflet with ruptured chordae vs can not rule out vegetation. Moderate eccentric posteriorly directed mitral regurgitation. Recommend BRITANY for further evaluation.   Plan for BRITANY on Friday  Repeat blood cultures NGTD  abx adjusted to unasyn  Repeat blood cultures until negative  General surgery consulted, underwent debridement   Wound care following      Atrial Fibrillation (now in NSR)  Cardio consulted  Amio 400 mg PO BID  C/w  Digoxin  BB DC'd due to BP issues  No AC indicated right now given history of hematuria     UTI in settings of indwelling mauro  On Unasyn  Urine culture +ve for proteus     MS with severe debility  bed bound   Continue Copaxone injections  Patient continuing to have a lot of pain  Palliative care following for pain management recommendations  He is currently placed on oxycodone every 4 hours and morphine for breakthrough     Dysphagia  On regular diet at the nursing home per family request  Multiple MBS done in the past but showing some level of dysphagia  Family and patient is not interested in in speech eval or another MBS  Patient and family is aware of risk of aspiration and accepting it fully  HOB at 45 degree of the time  Aspiration precautions  Supervised feeding  Appetite stimulant     Chronic cough, frequent coughing spells  Neurogenic bladder with indwelling Guthrie  CAD: Continue aspirin  Moderate protein calorie malnutrition: Dietary consult  Adult failure to thrive: Continue Remeron and Marinol  Functional quadriplegia  Depression: Continue Zoloft and Remeron  Hx of PE: Anticoagulation was discontinued due to recurrent hematuria          Code Status: full code d/w pt and niece at bedside   Surrogate Decision Maker: AUDREY finch Martina Gutierrez ( ortho NP [facility]) [phone]  and brother Maurizio Stephens [phone]     DVT Prophylaxis: lovenox   GI Prophylaxis: not indicated     Baseline: Nursing home resident, bedbound, no children       18.5 - 24.9 Normal weight / Body mass index is 20.36 kg/m². Estimated discharge date: February 17  Barriers: Wound care, bacteremia, infection    Code status: Full  Prophylaxis: Lovenox  Recommended Disposition: SNF/LTC     Subjective:     Chief Complaint / Reason for Physician Visit  Patient seen and examined at the bedside. Patient states that his pain is better controlled with the pain regimen is currently taking. No acute overnight events. Patient himself has no additional complaints. Discussed with RN events overnight. Patient had a fever at midnight    Review of Systems:  Symptom Y/N Comments  Symptom Y/N Comments   Fever/Chills    Chest Pain     Poor Appetite    Edema     Cough    Abdominal Pain     Sputum    Joint Pain     SOB/MATUTE    Pruritis/Rash     Nausea/vomit    Tolerating PT/OT     Diarrhea    Tolerating Diet     Constipation    Other       Could NOT obtain due to:      Objective:     VITALS:   Last 24hrs VS reviewed since prior progress note.  Most recent are:  Patient Vitals for the past 24 hrs:   Temp Pulse Resp BP SpO2   02/15/22 1209  78  111/61    02/15/22 1136 98.6 °F (37 °C) 79 20 131/61 100 %   02/15/22 0947  87  (!) 143/73    02/15/22 0811 98.1 °F (36.7 °C) 83 26 (!) 140/69 94 %   02/15/22 0236 97.9 °F (36.6 °C) 79 21 124/66 93 %   02/14/22 2329 98.1 °F (36.7 °C) 78 21 129/69 97 %   02/14/22 1947 98.4 °F (36.9 °C) 76 24 129/68 94 %   02/14/22 1811 99.1 °F (37.3 °C)       02/14/22 1743  80  109/65        Intake/Output Summary (Last 24 hours) at 2/15/2022 1548  Last data filed at 2/15/2022 1213  Gross per 24 hour   Intake 480 ml   Output 1475 ml   Net -995 ml        I had a face to face encounter and independently examined this patient on 2/15/2022, as outlined below:  PHYSICAL EXAM:  General: WD, WN. Alert, cooperative, no acute distress , Significantly debilitated, frail, temporal wasting  EENT:  EOMI. Anicteric sclerae. MMM  Resp:  CTA bilaterally, no wheezing or rales. No accessory muscle use  CV:  Regular  rhythm,  No edema  GI:  Soft, Non distended, Non tender. +Bowel sounds  Neurologic:  Alert and oriented X 3, normal speech,   Psych:   Good insight. Not anxious nor agitated  Skin:  No rashes. No jaundice, see wound care for ulcers    Reviewed most current lab test results and cultures  YES  Reviewed most current radiology test results   YES  Review and summation of old records today    NO  Reviewed patient's current orders and MAR    YES  PMH/SH reviewed - no change compared to H&P  ________________________________________________________________________  Care Plan discussed with:    Comments   Patient     Family      RN     Care Manager     Consultant                        Multidiciplinary team rounds were held today with , nursing, pharmacist and clinical coordinator. Patient's plan of care was discussed; medications were reviewed and discharge planning was addressed.      ________________________________________________________________________  Total NON critical care TIME:  33   Minutes    Total CRITICAL CARE TIME Spent:   Minutes non procedure based      Comments   >50% of visit spent in counseling and coordination of care     ________________________________________________________________________  Stephanie Feng MD     Procedures: see electronic medical records for all procedures/Xrays and details which were not copied into this note but were reviewed prior to creation of Plan. LABS:  I reviewed today's most current labs and imaging studies.   Pertinent labs include:  Recent Labs     02/15/22  0200 02/14/22  0546 02/13/22  0433   WBC 9.2 9.8 6.6   HGB 8.7* 8.5* 8.1*   HCT 29.8* 29.2* 26.9*    355 272     Recent Labs     02/15/22  0200 02/14/22  0546 02/13/22  0433    141 141   K 3.3* 3.5 3.3*    109* 112*   CO2 30 27 25    97 107*   BUN 3* 4* 7   CREA 0.24* 0.24* 0.29*   CA 7.5* 7.7* 7.9*   MG 2.1 2.0 2.0   PHOS 2.3* 2.2* 2.5*       Signed: Stephanie Feng MD

## 2022-02-15 NOTE — PROGRESS NOTES
Admit Date: 2022    POD * No surgery found *    Procedure:  * No surgery found *      Assessment:   Active Problems:    Sepsis ([address] (2022)      Sacral pressure ulcer (2022)      Frailty ()      Acute pain ()      Physical debility ()      Anorexia ()      Palliative care by specialist ()      Fatigue, unspecified type ()      1. Bacteremia with strep B and proteus mirabalis  2. 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 They likely will never heal.  Fecal diversion may help but comorbidities make it challenging. At this point he is declining fecal diversion  3. Chronic microcytic anemia.  hgb 8.3  4. Severe protein calorie malnutrition. Alb 2.6. This will prevent wound healing  5. Severe MS.    6.  New left hip deep pressure area that likely was POA and developing        Plan/Recommendations/Medical Decision Makin. Likely change wound care approach later today with wound care team  2. Continue abx  3. Avoid pressure on wounds      Subjective:     Patient has no new complaints. Objective:     Blood pressure 124/66, pulse 79, temperature 97.9 °F (36.6 °C), resp. rate 21, height 6' 1\" (1.854 m), weight 69.9 kg (154 lb), SpO2 93 %. Temp (24hrs), Av °F (37.2 °C), Min:97.9 °F (36.6 °C), Max:100.9 °F (38.3 °C)      Physical Exam:  LUNG: clear to auscultation bilaterally, HEART: regular rate and rhythm, S1, S2 normal, no murmur, click, rub or gallop, ABDOMEN: soft, non-tender.  Bowel sounds normal. No masses,  no organomegaly  EXT:  Contractures  SKIN:  Sacral/ischial wounds dressings intact      Labs:   Recent Results (from the past 48 hour(s))   GLUCOSE, POC    Collection Time: 22 11:40 AM   Result Value Ref Range    Glucose (POC) 111 65 - 117 mg/dL    Performed by [email or web address] PCT    GLUCOSE, POC    Collection Time: 22  4:16 PM   Result Value Ref Range    Glucose (POC) 115 65 - 117 mg/dL    Performed by P[employer] PCT    GLUCOSE, POC Collection Time: 02/13/22  9:18 PM   Result Value Ref Range    Glucose (POC) 99 65 - 117 mg/dL    Performed by Elisa Pantoja PCT    METABOLIC PANEL, BASIC    Collection Time: 02/14/22  5:46 AM   Result Value Ref Range    Sodium 141 136 - 145 mmol/L    Potassium 3.5 3.5 - 5.1 mmol/L    Chloride 109 (H) 97 - 108 mmol/L    CO2 27 21 - 32 mmol/L    Anion gap 5 5 - 15 mmol/L    Glucose 97 65 - 100 mg/dL    BUN 4 (L) 6 - 20 MG/DL    Creatinine 0.24 (L) 0.70 - 1.30 MG/DL    BUN/Creatinine ratio 17 12 - 20      GFR est AA >60 >60 ml/min/1.73m2    GFR est non-AA >60 >60 ml/min/1.73m2    Calcium 7.7 (L) 8.5 - 10.1 MG/DL   MAGNESIUM    Collection Time: 02/14/22  5:46 AM   Result Value Ref Range    Magnesium 2.0 1.6 - 2.4 mg/dL   PHOSPHORUS    Collection Time: 02/14/22  5:46 AM   Result Value Ref Range    Phosphorus 2.2 (L) 2.6 - 4.7 MG/DL   CBC WITH AUTOMATED DIFF    Collection Time: 02/14/22  5:46 AM   Result Value Ref Range    WBC 9.8 4.1 - 11.1 K/uL    RBC 3.72 (L) 4.10 - 5.70 M/uL    HGB 8.5 (L) 12.1 - 17.0 g/dL    HCT 29.2 (L) 36.6 - 50.3 %    MCV 78.5 (L) 80.0 - 99.0 FL    MCH 22.8 (L) 26.0 - 34.0 PG    MCHC 29.1 (L) 30.0 - 36.5 g/dL    RDW 17.8 (H) 11.5 - 14.5 %    PLATELET 000 066 - 381 K/uL    MPV 9.8 8.9 - 12.9 FL    NRBC 0.0 0  WBC    ABSOLUTE NRBC 0.00 0.00 - 0.01 K/uL    NEUTROPHILS 77 (H) 32 - 75 %    LYMPHOCYTES 11 (L) 12 - 49 %    MONOCYTES 9 5 - 13 %    EOSINOPHILS 2 0 - 7 %    BASOPHILS 0 0 - 1 %    IMMATURE GRANULOCYTES 1 (H) 0.0 - 0.5 %    ABS. NEUTROPHILS 7.7 1.8 - 8.0 K/UL    ABS. LYMPHOCYTES 1.1 0.8 - 3.5 K/UL    ABS. MONOCYTES 0.8 0.0 - 1.0 K/UL    ABS. EOSINOPHILS 0.2 0.0 - 0.4 K/UL    ABS. BASOPHILS 0.0 0.0 - 0.1 K/UL    ABS. IMM.  GRANS. 0.1 (H) 0.00 - 0.04 K/UL    DF AUTOMATED     GLUCOSE, POC    Collection Time: 02/14/22  7:01 AM   Result Value Ref Range    Glucose (POC) 104 65 - 117 mg/dL    Performed by Elisa Pantoja PCT    GLUCOSE, POC    Collection Time: 02/14/22 11:22 AM Result Value Ref Range    Glucose (POC) 105 65 - 117 mg/dL    Performed by Leonidas Ang PCT    GLUCOSE, POC    Collection Time: 02/14/22  4:25 PM   Result Value Ref Range    Glucose (POC) 100 65 - 117 mg/dL    Performed by Fely Cordova RN    GLUCOSE, POC    Collection Time: 02/14/22  8:39 PM   Result Value Ref Range    Glucose (POC) 114 65 - 117 mg/dL    Performed by Dasha Colon PCT    METABOLIC PANEL, BASIC    Collection Time: 02/15/22  2:00 AM   Result Value Ref Range    Sodium 140 136 - 145 mmol/L    Potassium 3.3 (L) 3.5 - 5.1 mmol/L    Chloride 108 97 - 108 mmol/L    CO2 30 21 - 32 mmol/L    Anion gap 2 (L) 5 - 15 mmol/L    Glucose 100 65 - 100 mg/dL    BUN 3 (L) 6 - 20 MG/DL    Creatinine 0.24 (L) 0.70 - 1.30 MG/DL    BUN/Creatinine ratio 13 12 - 20      GFR est AA >60 >60 ml/min/1.73m2    GFR est non-AA >60 >60 ml/min/1.73m2    Calcium 7.5 (L) 8.5 - 10.1 MG/DL   PHOSPHORUS    Collection Time: 02/15/22  2:00 AM   Result Value Ref Range    Phosphorus 2.3 (L) 2.6 - 4.7 MG/DL   CBC WITH AUTOMATED DIFF    Collection Time: 02/15/22  2:00 AM   Result Value Ref Range    WBC 9.2 4.1 - 11.1 K/uL    RBC 3.72 (L) 4.10 - 5.70 M/uL    HGB 8.7 (L) 12.1 - 17.0 g/dL    HCT 29.8 (L) 36.6 - 50.3 %    MCV 80.1 80.0 - 99.0 FL    MCH 23.4 (L) 26.0 - 34.0 PG    MCHC 29.2 (L) 30.0 - 36.5 g/dL    RDW 17.6 (H) 11.5 - 14.5 %    PLATELET 774 813 - 784 K/uL    MPV 9.2 8.9 - 12.9 FL    NRBC 0.0 0  WBC    ABSOLUTE NRBC 0.00 0.00 - 0.01 K/uL    NEUTROPHILS 75 32 - 75 %    LYMPHOCYTES 13 12 - 49 %    MONOCYTES 10 5 - 13 %    EOSINOPHILS 2 0 - 7 %    BASOPHILS 0 0 - 1 %    IMMATURE GRANULOCYTES 0 0.0 - 0.5 %    ABS. NEUTROPHILS 6.9 1.8 - 8.0 K/UL    ABS. LYMPHOCYTES 1.2 0.8 - 3.5 K/UL    ABS. MONOCYTES 0.9 0.0 - 1.0 K/UL    ABS. EOSINOPHILS 0.2 0.0 - 0.4 K/UL    ABS. BASOPHILS 0.0 0.0 - 0.1 K/UL    ABS. IMM.  GRANS. 0.0 0.00 - 0.04 K/UL    DF AUTOMATED     GLUCOSE, POC    Collection Time: 02/15/22  6:43 AM   Result Value Ref Range    Glucose (POC) 101 65 - 117 mg/dL    Performed by Cathleen Nichols PCT        Data Review images and reports reviewed

## 2022-02-15 NOTE — PROGRESS NOTES
Spiritual Care Assessment/Progress Note  [facility]      NAME: Reynaldo Humphreys      MRN: [id number]  AGE: 80 y.o. SEX: male  Adventist Affiliation: Unknown   Language: English     2/15/2022     Total Time (in minutes): 5     Spiritual Assessment begun in [facility] through conversation with:         []Patient        [] Family    [] Friend(s)        Reason for Consult: Palliative Care, Initial/Spiritual Assessment     Spiritual beliefs: (Please include comment if needed)     [] Identifies with a jet tradition:         [] Supported by a jet community:            [] Claims no spiritual orientation:           [] Seeking spiritual identity:                [] Adheres to an individual form of spirituality:           [x] Not able to assess:                           Identified resources for coping:      [] Prayer                               [] Music                  [] Guided Imagery     [] Family/friends                 [] Pet visits     [] Devotional reading                         [] Unknown     [] Other:                                              Interventions offered during this visit: (See comments for more details)    Patient Interventions: Initial visit           Plan of Care:     [] Support spiritual and/or cultural needs    [] Support AMD and/or advance care planning process      [] Support grieving process   [] Coordinate Rites and/or Rituals    [] Coordination with community clergy   [] No spiritual needs identified at this time   [] Detailed Plan of Care below (See Comments)  [] Make referral to Music Therapy  [] Make referral to Pet Therapy     [] Make referral to Addiction services  [] Make referral to [facility]  [] Make referral to Spiritual Care Partner  [] No future visits requested        [x] Contact Spiritual Care for further referrals     Comments: Attempted initial spiritual assessment with palliative pt in 2209. Pt soundly sleeping, no family in room. Did not awake to knock or empathic touch. Chart review did not indicate any Caodaism preference. Contact Spiritual Care for any further referrals.   Kandis Wu M.Div, [facility]   Paging Service [phone])

## 2022-02-15 NOTE — PROGRESS NOTES
Problem: Pressure Injury - Risk of  Goal: *Prevention of pressure injury  Description: Document Lewis Scale and appropriate interventions in the flowsheet. Outcome: Progressing Towards Goal  Note: Pressure Injury Interventions:  Sensory Interventions: Assess changes in LOC,Avoid rigorous massage over bony prominences,Float heels,Keep linens dry and wrinkle-free,Minimize linen layers,Maintain/enhance activity level    Moisture Interventions: Absorbent underpads,Minimize layers,Internal/External urinary devices    Activity Interventions: Pressure redistribution bed/mattress(bed type),PT/OT evaluation    Mobility Interventions: Pressure redistribution bed/mattress (bed type),PT/OT evaluation    Nutrition Interventions: Document food/fluid/supplement intake,Discuss nutritional consult with provider,Offer support with meals,snacks and hydration    Friction and Shear Interventions: Foam dressings/transparent film/skin sealants,HOB 30 degrees or less,Lift sheet,Minimize layers                Problem: Patient Education: Go to Patient Education Activity  Goal: Patient/Family Education  Outcome: Progressing Towards Goal     Problem: Falls - Risk of  Goal: *Absence of Falls  Description: Document Bianca Fall Risk and appropriate interventions in the flowsheet.   Outcome: Progressing Towards Goal  Note: Fall Risk Interventions:  Mobility Interventions: Bed/chair exit alarm,Communicate number of staff needed for ambulation/transfer,Patient to call before getting OOB    Mentation Interventions: Bed/chair exit alarm,Adequate sleep, hydration, pain control,Door open when patient unattended,Evaluate medications/consider consulting pharmacy,More frequent rounding,Reorient patient    Medication Interventions: Bed/chair exit alarm,Evaluate medications/consider consulting pharmacy,Patient to call before getting OOB    Elimination Interventions: Bed/chair exit alarm,Call light in reach,Patient to call for help with toileting needs Problem: Patient Education: Go to Patient Education Activity  Goal: Patient/Family Education  Outcome: Progressing Towards Goal     Problem: General Wound Care  Goal: *Infected Wound: Prevention of further infection and promotion of healing  Description: Infection control procedures (eg: clean dressings, clean gloves, hand washing, precautions to isolate wound from contamination, sterile instruments used for wound debridement) should be implemented.   Outcome: Progressing Towards Goal  Goal: Interventions  Outcome: Progressing Towards Goal

## 2022-02-15 NOTE — PROGRESS NOTES
Transition of Care Plan:  RUR: 18% moderate  Disposition: return to [facility] for LTC  Follow up appointments: PCP, speciality   DME needed: none anticipated   Transportation at Discharge: [id number] [facility] or means to access home: n/a      Medicare Letter: to be provided  Is patient a BCPI-A Bundle: no              If yes, was Bundle Letter given?:    Is patient a  and connected with the [place]? yes              If yes, was [employer] transfer form completed and VA notified? VA has been notified, waiting on MD to complete his portion of transfer paperwork  Caregiver Contact: Tran Stapleton [phone]  Discharge Caregiver contacted prior to discharge? yes  Care Conference needed?: not at this time      Initial note:  Chart reviewed. Clinical info and transfer form signed by Dr. Anuj Kebede faxed to the [place]. CM will continue to monitor for d/c needs.     Freddy Valdez, MSN  Care Manager  [phone]

## 2022-02-15 NOTE — PROGRESS NOTES
Palliative Medicine Consult  Mike: [phone]    Patient Name: Natasha Liao  YOB: 1950    Date of Initial Consult: 2/14/22  Reason for Consult: Other- acute on chronic pain  Requesting Provider: Johan Conrad MD  Primary Care Physician: Clarisse Meigs, MD     SUMMARY:   Natasha Liao is a 80 y.o. with a past history of MS, CVA, BPH, HLD, Pressure Ulcers, PE, Malnutrition, Functional Quad, and CAD, who was admitted on 2/9/2022 from home with a diagnosis of sepsis 2/2 infected sacral and left ischium pressure ulcers with a history of chronic osteomyelitis. 2/14:  Earlier today he had some sharp debridement of the necrotic tissue on his sacral ulcer. Overnight his pain was more severe than he was able to tolerate, even with a chronic pain that he lives with all the time. Rx: oxycodone 5 mg PO every 4 hours scheduled, and morphine 2mg IV every 4 hours prn. Goals of care are clear with full code and interventions. AMD on file appointing brother and niece as primary and secondary mPOAs. PALLIATIVE DIAGNOSES:   1. Frailty  2. Acute on chronic pain  3. Physical debility  4. Anorexia  5. Fatigue  6. Palliative Encounter     PLAN:   1. I spoke to the patient at the beside today. It was noted that he declined 2 doses of his scheduled oxycodone, and was noted to be in increased pain this morning when WHITNEY Moore went in to see him. Scheduled oxycodone was administered at that time. He was unable to provide a numerical rating but stated his pain is controlled to an acceptable level for him and reports his overall pain is back at baseline. I had a discussion with Mr. Jaimee Villarreal regarding the goals of pain management and the rationale for scheduled po oxycodone to reduce the peaking and troughing lability of his pain when medication is staggered and inconsistent in timing. I attempted to investigate his concerns regarding taking the scheduled doses, and he denied any concerns.  We discussed the ability to titrate dosing and timing of medications such as increasing intervals, as well to meet his needs and address any concerns he may have. He seemed a little lethargic, but remained engaged in conversation. His brother was at the bedside and stated he believes it is from the pain medication. Mr. Will Hernandez denied concern regarding lethargy with scheduled dosing, and wished to keep dosing and schedule the same at this time. He states acceptable relief of pain during dressing changes with prn IV morphine. He was in understanding of rationale of plan and has agreed for us to check in again tomorrow. For now, we will continue with the current pain medication regimen of scheduled oxycodone and tylenol, and IV morphine as needed. 2. Anorexia  -Con't diet as tolerated. Pt's brother was at the bedside to assist in feeding at the time of this encounter. Pt was looking forward to lunch. 3. Initial consult note routed to primary continuity provider and/or primary health care team members  4. Communicated plan of care with: Palliative [facility] Team     GOALS OF CARE / TREATMENT PREFERENCES:     GOALS OF CARE:  Patient/Health Care Proxy Stated Goals: Prolong life    TREATMENT PREFERENCES:   Code Status: Full Code    Advance Care Planning:  [x] The [facility] Interdisciplinary Team has updated the ACP Navigator with Health Care Decision Maker and Patient Capacity      Primary Decision MakerMyrrashawn Dunn - Brother - [phone]    Secondary Decision Maker: Raheem Cadet - Other Relative - [phone]  Advance Care Planning 2/12/2022   Confirm Advance Directive Yes, on file       Medical Interventions: Full interventions       Other:    As far as possible, the palliative care team has discussed with patient / health care proxy about goals of care / treatment preferences for patient.      HISTORY:     History obtained from: chart, patient and family    CHIEF COMPLAINT: pain to sacral wound    HPI/SUBJECTIVE: The patient is:   [x] Verbal and participatory  [] Non-participatory due to:       Clinical Pain Assessment (nonverbal scale for severity on nonverbal patients):   Clinical Pain Assessment  Severity: 3  Location: chronic baseline  Character: aching, sharp  Duration: chronic  Effect: unable to tell me  Factors: wound care makes it worse  Frequency: continuous          Duration: for how long has pt been experiencing pain (e.g., 2 days, 1 month, years)  Frequency: how often pain is an issue (e.g., several times per day, once every few days, constant)     FUNCTIONAL ASSESSMENT:     Palliative Performance Scale (PPS):  PPS: 30       PSYCHOSOCIAL/SPIRITUAL SCREENING:     Palliative IDT has assessed this patient for cultural preferences / practices and a referral made as appropriate to needs (Cultural Services, Patient Advocacy, Ethics, etc.)    Any spiritual / Moravian concerns:  [] Yes /  [x] No   If \"Yes\" to discuss with pastoral care during IDT     Does caregiver feel burdened by caring for their loved one:   [] Yes /  [x] No /  [] No Caregiver Present    If \"Yes\" to discuss with social work during IDT    Anticipatory grief assessment:   [x] Normal  / [] Maladaptive     If \"Maladaptive\" to discuss with social work during IDT    ESAS Anxiety: Anxiety: 0    ESAS Depression: Depression: 0        REVIEW OF SYSTEMS:     Positive and pertinent negative findings in ROS are noted above in HPI. The following systems were [x] reviewed / [] unable to be reviewed as noted in HPI  Other findings are noted below. Systems: constitutional, ears/nose/mouth/throat, respiratory, gastrointestinal, genitourinary, musculoskeletal, integumentary, neurologic, psychiatric, endocrine. Positive findings noted below.   Modified ESAS Completed by: provider   Fatigue: 6 Drowsiness: 1 (slow to converse however appropriate in content and engaged in conversation, eyes often closed.)   Depression: 0 Pain: 3 (Reports presence of chronic baseline pain and adequate relief of acute on chronic pain)   Anxiety: 0 Nausea: 0 (Denies)   Anorexia: 6 Dyspnea: 0           Stool Occurrence(s): (P) 1        PHYSICAL EXAM:     From RN flowsheet:  Wt Readings from Last 3 Encounters:   02/15/22 154 lb 5.2 oz (70 kg)   08/06/21 152 lb 1.9 oz (69 kg)   07/20/16 152 lb (68.9 kg)     Blood pressure 111/61, pulse 78, temperature 98.6 °F (37 °C), resp. rate 20, height 6' 1\" (1.854 m), weight 154 lb 5.2 oz (70 kg), SpO2 100 %. Pain Scale 1: Numeric (0 - 10)  Pain Intensity 1: 2  Pain Onset 1: wound  Pain Location 1: Buttocks,Ankle  Pain Orientation 1: Mid,Right  Pain Description 1: Aching  Pain Intervention(s) 1: Medication (see MAR)  Last bowel movement, if known:     Constitutional: Over all frail and chronically ill in appearance. Mildly lethargic, however engaged and appropriate in conversation, pleasant, and humurous at times with myself and his brother. Respiratory: breathing not labored, symmetric  Musculoskeletal: quadriplegic and wasted muscle appearance reflective of this. Skin: warm, dry  Neurologic: following commands, functional quadriplegic with hx of MS  Psychiatric: full affect, no hallucinations  Other: n/a       HISTORY:     Active Problems:    Sepsis ([facility] [address] (2/9/2022)      Sacral pressure ulcer (2/9/2022)      Frailty ()      Acute pain ()      Physical debility ()      Anorexia ()      Palliative care by specialist ()      Fatigue, unspecified type ()      Past Medical History:   Diagnosis Date    MS (multiple sclerosis) ([facility] [address]     Stroke ([facility] [address]       Past Surgical History:   Procedure Laterality Date    HX CORONARY STENT PLACEMENT      x7       No family history on file. History reviewed, no pertinent family history.   Social History     Tobacco Use    Smoking status: Never Smoker    Smokeless tobacco: Not on file   Substance Use Topics    Alcohol use: No     Allergies   Allergen Reactions    Beeswax Anaphylaxis    Bee Pollen Unknown (comments)  Simvastatin Not Reported This Time    Tolterodine Not Reported This Time    Venom-Wasp Not Reported This Time      Current Facility-Administered Medications   Medication Dose Route Frequency    oxyCODONE IR (ROXICODONE) tablet 5 mg  5 mg Oral Q4H    acetaminophen (TYLENOL) tablet 1,000 mg  1,000 mg Oral TID    ampicillin-sulbactam (UNASYN) 3 g in 0.9% sodium chloride (MBP/ADV) 100 mL MBP  3 g IntraVENous Q6H    amiodarone (CORDARONE) tablet 400 mg  400 mg Oral BID    morphine injection 2 mg  2 mg IntraVENous Q4H PRN    midodrine (PROAMATINE) tablet 5 mg  5 mg Oral TID WITH MEALS    sodium hypochlorite (HALF STRENGTH DAKIN'S) 0.25% irrigation (bottle)   Topical BID    [Held by provider] metoprolol tartrate (LOPRESSOR) tablet 12.5 mg  12.5 mg Oral Q12H    balsam peru-castor oiL (VENELEX) ointment   Topical BID    ferrous sulfate tablet 325 mg  325 mg Oral EVERY OTHER DAY    lidocaine/EPINEPHrine/tetracaine (L.E.T) topical gel  3 mL Topical Q12H    digoxin (LANOXIN) tablet 0.125 mg  0.125 mg Oral DAILY    dextrose 10% infusion 125-250 mL  125-250 mL IntraVENous PRN    sodium chloride (NS) flush 5-10 mL  5-10 mL IntraVENous PRN    ondansetron (ZOFRAN) injection 4 mg  4 mg IntraVENous Q6H PRN    aspirin chewable tablet 81 mg  81 mg Oral DAILY    albuterol-ipratropium (DUO-NEB) 2.5 MG-0.5 MG/3 ML  3 mL Nebulization Q4H PRN    guaiFENesin (ROBITUSSIN) 100 mg/5 mL oral liquid 200 mg  200 mg Oral Q6H PRN    dronabinoL (MARINOL) capsule 2.5 mg  2.5 mg Oral ACB&D    mirtazapine (REMERON) tablet 30 mg  30 mg Oral QHS    insulin lispro (HUMALOG) injection   SubCUTAneous AC&HS    glucose chewable tablet 16 g  4 Tablet Oral PRN    glucagon (GLUCAGEN) injection 1 mg  1 mg IntraMUSCular PRN    pantoprazole (PROTONIX) tablet 40 mg  40 mg Oral ACB    senna-docusate (PERICOLACE) 8.6-50 mg per tablet 2 Tablet  2 Tablet Oral QHS    sertraline (ZOLOFT) tablet 25 mg  25 mg Oral DAILY    glatiramer (COPAXONE) injection 20 mg  20 mg SubCUTAneous Q MON, WED & FRI    0.9% sodium chloride infusion  125 mL/hr IntraVENous CONTINUOUS    L.acidophilus-paracasei-S.thermophil-bifidobacter (RISAQUAD) 8 billion cell capsule  1 Capsule Oral DAILY    enoxaparin (LOVENOX) injection 40 mg  40 mg SubCUTAneous DAILY          LAB AND IMAGING FINDINGS:     Lab Results   Component Value Date/Time    WBC 9.2 02/15/2022 02:00 AM    HGB 8.7 (L) 02/15/2022 02:00 AM    PLATELET 903 79/17/6942 02:00 AM     Lab Results   Component Value Date/Time    Sodium 140 02/15/2022 02:00 AM    Potassium 3.3 (L) 02/15/2022 02:00 AM    Chloride 108 02/15/2022 02:00 AM    CO2 30 02/15/2022 02:00 AM    BUN 3 (L) 02/15/2022 02:00 AM    Creatinine 0.24 (L) 02/15/2022 02:00 AM    Calcium 7.5 (L) 02/15/2022 02:00 AM    Magnesium 2.1 02/15/2022 02:00 AM    Phosphorus 2.3 (L) 02/15/2022 02:00 AM      Lab Results   Component Value Date/Time    Alk. phosphatase 70 02/10/2022 04:00 AM    Protein, total 6.3 (L) 02/10/2022 04:00 AM    Albumin 1.9 (L) 02/10/2022 04:00 AM    Globulin 4.4 (H) 02/10/2022 04:00 AM     Lab Results   Component Value Date/Time    INR 1.3 (H) 02/10/2022 04:00 AM    Prothrombin time 13.2 (H) 02/10/2022 04:00 AM      No results found for: IRON, FE, TIBC, IBCT, PSAT, FERR   No results found for: PH, PCO2, PO2  No components found for: GLPOC   No results found for: CPK, CKMB             Total time: 35 minutes  Counseling / coordination time, spent as noted above: 20 minutes  > 50% counseling / coordination?: yes    Prolonged service was provided for  []30 min   []75 min in face to face time in the presence of the patient, spent as noted above. Time Start:   Time End:   Note: this can only be billed with 32433 (initial) or 25246 (follow up). If multiple start / stop times, list each separately.

## 2022-02-15 NOTE — PROGRESS NOTES
Infectious Disease progress      IMPRESSION:     -Sepsis    -Bacteremia with strep agalactiae sero Gp B Strep 1/2 ,P.mirabilis 2nd of 2  BC- 2/9 -Strep agalactiae Gp B beta hemolytic  1/2, P.mirabilis 2nd of 2 bottles. Negative cultures-2/11, 2/12. ECHO- anterior mitral valve leaflet with ruptured chordae vs? Vegetation.    -Infected sacral & ischial ulcers   Wound cultures-2/9-light P.mirabilis, light strep Gp B beta hemolytic, light alpha strep, moderate Bacteroides uniformis, beta lactamase negative.    -Chronic OM of sacrococcygeal junction,acute OM ischial tuberosities. CT scan-Deep soft tissue ulcerations extending to the bilateral ischial tuberosities  with associated acute osteomyelitis. Chronic osteomyelitis involving the sacrococcygeal junction with overlying  soft tissue ulceration and abscess. Bilateral femoral head avascular necrosis with subtle subchondral collapse. S/p local debridement at bedside by Gen Surgery today 2/14. -UTI with P.mirabilis   indwelling mauro, neurogenic bladder  UC + for >100,000 P.mirabilis. -Severe MS   bedbound, functional quadriplegia     -Dysphagia    -Chronic cough    -Severe protein calory malnutrition    -Elevated A1c 6.6 ? Diabetes mellitus    -ESR 73, CRP11.2         PLAN:        -Continue Unasyn iv x 6 weeks end date 3/25  -Pharmacy on consult for dosing, monitor Cr  -BRITANY   -Wound Care per Wound care, Surgery recommendations.  -Blood sugar control.  -Aspiration precautions. Patient seen. More comfortable today, resting arousable  Family at bedside. D/w niece of pt. Art Barnard is a 80year old male with history DM, MS, Stroke who presented on 2/9 with sealock wounds on sacrum and ischium. He has bilaterally with  fever. Patient is a resident of SNF, found to have fever and tachycardia on morning of admission with worsening of his chronic wounds to sacrum, heels. Patient sating 90% on room air, placed on o2.  Per ED note he was  oriented to self, stable neuro exam at time of admission. Patient tachycardic to 126, /70, fecbrile to 101.6 en route. Unknown vaccination status. Patient reports pain to his bottom which has been worsening over the past week approximately. When speaking to his niece Elvin Ramos who is NP with orthopedics, I have learned that the best been dealing with chronic sacral wounds, sacral osteomyelitis. Usually receives treatment at the [place]. Patient has developed newly skin wounds bilaterally which have been progressing over the past month. Patient has diagnosis of chronic  osteomyelitis of sacrum, recurrent UTI, pneumonia. Right ankle wound has been present for 1 year. CT pelvis-2/9   As follows  FINDINGS:  Bowel: Partially imaged, within normal limits.     Reproductive organs: Within normal limits.     Soft tissues: No pelvic mass or lymphadenopathy.     Fluid: No ascites or drainable fluid collection     Bones: Chronic appearing erosive changes and fragmentation at the sacrococcygeal  junction. Approximately 6.2 cm x 0.8 cm x 10.7 cm amorphous thick-walled mottled  fluid and gas collection in the presacral soft tissues with overlying skin  ulceration, likely an abscess. Deep soft tissue ulceration extending to the  bilateral ischial tuberosities. No aggressive patchy osteolysis at ischial  tuberosities concerning for acute osteoarthritis. Degenerative changes in the  spine. Bilateral femoral head avascular necrosis with subtle subchondral  collapse     Miscellaneous: Atherosclerosis. Bladder decompressed with Guthrie in place     IMPRESSION  1. Deep soft tissue ulcerations extending to the bilateral ischial tuberosities  with associated acute osteomyelitis. 2.  Chronic osteomyelitis involving the sacrococcygeal junction with overlying  soft tissue ulceration and abscess. 3.  Bilateral femoral head avascular necrosis with subtle subchondral collapse    D/w wound care. Also RN regarding current status of wounds.  Currently attending clean. Wound cultures done on 2/9+ for light GNR, multiple strep species. Blood culture-2/9+ for group B beta-hemolytic strep 1/2, GNR 2nd of tuberculous. Urine culture- 2/9 ->100,000 GNR. Patient has been febrile. T-max as follows  2/.2, 2/10- 102.8, 2/9-101.6. Patient seen today. He appears comfortable. Denies complaints at this time. Discussed with patient's niece and RN. Patient Active Problem List   Diagnosis Code    Sepsis ([facility] A41.9    Sacral pressure ulcer L89.159    Frailty R54    Acute pain R52    Physical debility R53.81    Anorexia R63.0    Palliative care by specialist Z51.5    Fatigue, unspecified type R53.83     Past Medical History:   Diagnosis Date    [facility]     Stroke ([facility]       No family history on file. Social History     Tobacco Use    Smoking status: Never Smoker    Smokeless tobacco: Not on file   Substance Use Topics    Alcohol use: No     Past Surgical History:   Procedure Laterality Date    HX CORONARY STENT PLACEMENT      x7       Prior to Admission medications    Medication Sig Start Date End Date Taking? Authorizing Provider   acetaminophen (TYLENOL) 500 mg tablet Take 1,000 mg by mouth every eight (8) hours as needed (moderate pain). Yes Provider, Historical   ascorbic acid, vitamin C, (VITAMIN C) 500 mg tablet Take 500 mg by mouth daily. Yes Provider, Historical   aspirin delayed-release 81 mg tablet Take 81 mg by mouth daily. Yes Provider, Historical   bisacodyL (DULCOLAX) 10 mg supp Insert 10 mg into rectum daily as needed for Constipation. Yes Provider, Historical   cholecalciferol (VITAMIN D3) (2,000 UNITS /50 MCG) cap capsule Take 2,000 Units by mouth daily. Yes Provider, Historical   cyanocobalamin (VITAMIN B12) 100 mcg tablet Take 200 mcg by mouth daily. Yes Provider, Historical   docusate sodium (COLACE) 100 mg capsule Take 100 mg by mouth nightly.    Yes Provider, Historical   ferrous sulfate 325 mg (65 mg iron) tablet Take 325 mg by mouth every other day. Yes Provider, Historical   folic acid (FOLVITE) 1 mg tablet Take 2 mg by mouth daily. Yes Provider, Historical   glatiramer (COPAXONE) 40 mg/mL syrg 40 mg by SubCUTAneous route every Monday, Wednesday, Friday. Yes Provider, Historical   polyethylene glycol (Miralax) 17 gram packet Take 17 g by mouth daily. Yes Provider, Historical   guaiFENesin (ORGANIDIN) 200 mg tablet Take 400 mg by mouth every eight (8) hours as needed for Congestion. Yes Provider, Historical   albuterol-ipratropium (DUO-NEB) 2.5 mg-0.5 mg/3 ml nebu 3 mL by Nebulization route every four (4) hours as needed for Wheezing or Shortness of Breath. Yes Provider, Historical   lidocaine-vit e-aloe vera-wayne 2 % topical gel Apply 1 Each to affected area Every Thursday. Apply to ischiums and right ankle prior to wound care visit   Yes Provider, Historical   lidocaine-vit e-aloe vera-wayne 2 % topical gel Apply 1 Each to affected area Every Thursday. Apply to sacrum prior to wound care   Yes Provider, Historical   dronabinoL (MARINOL) 2.5 mg capsule Take 2.5 mg by mouth two (2) times a day. Yes Provider, Historical   mirtazapine (REMERON) 30 mg tablet Take 30 mg by mouth nightly. Yes Provider, Historical   therapeutic multivitamin (THERAGRAN) tablet Take 1 Tablet by mouth daily. Yes Provider, Historical   insulin aspart U-100 (NOVOLOG) 100 unit/mL injection by SubCUTAneous route Before breakfast, lunch, and dinner. 150-200 = 2 units  201-250 = 4 units  251-300 = 5 units  301-350 = 7 units  351-400 = 9 units  401+ = 10 units   Yes Provider, Historical   omeprazole (PRILOSEC) 40 mg capsule Take 40 mg by mouth daily. Yes Provider, Historical   oxyCODONE IR (ROXICODONE) 5 mg immediate release tablet Take 5 mg by mouth every four (4) hours as needed for Pain. Yes Provider, Historical   senna (SENOKOT) 8.6 mg tablet Take 2 Tablets by mouth nightly.    Yes Provider, Historical   sertraline (ZOLOFT) 25 mg tablet Take 25 mg by mouth daily. Yes Provider, Historical   zinc sulfate (Zinc-220) 50 mg zinc (220 mg) capsule Take 1 Capsule by mouth every other day. Yes Provider, Historical     Allergies   Allergen Reactions    Beeswax Anaphylaxis    Bee Pollen Unknown (comments)    Simvastatin Not Reported This Time    Tolterodine Not Reported This Time    Venom-Wasp Not Reported This Time        Review of Systems:  A comprehensive review of systems was negative except for that written in the History of Present Illness. 14 point review of systems obtained . All other systems negative    Objective:   Blood pressure 111/61, pulse 78, temperature 98.6 °F (37 °C), resp. rate 20, height 6' 1\" (1.854 m), weight 154 lb 5.2 oz (70 kg), SpO2 100 %.   Temp (24hrs), Av.5 °F (36.9 °C), Min:97.9 °F (36.6 °C), Max:99.4 °F (37.4 °C)    Current Facility-Administered Medications   Medication Dose Route Frequency    oxyCODONE IR (ROXICODONE) tablet 5 mg  5 mg Oral Q4H    acetaminophen (TYLENOL) tablet 1,000 mg  1,000 mg Oral TID    ampicillin-sulbactam (UNASYN) 3 g in 0.9% sodium chloride (MBP/ADV) 100 mL MBP  3 g IntraVENous Q6H    amiodarone (CORDARONE) tablet 400 mg  400 mg Oral BID    morphine injection 2 mg  2 mg IntraVENous Q4H PRN    midodrine (PROAMATINE) tablet 5 mg  5 mg Oral TID WITH MEALS    sodium hypochlorite (HALF STRENGTH DAKIN'S) 0.25% irrigation (bottle)   Topical BID    [Held by provider] metoprolol tartrate (LOPRESSOR) tablet 12.5 mg  12.5 mg Oral Q12H    balsam peru-castor oiL (VENELEX) ointment   Topical BID    ferrous sulfate tablet 325 mg  325 mg Oral EVERY OTHER DAY    lidocaine/EPINEPHrine/tetracaine (L.E.T) topical gel  3 mL Topical Q12H    digoxin (LANOXIN) tablet 0.125 mg  0.125 mg Oral DAILY    dextrose 10% infusion 125-250 mL  125-250 mL IntraVENous PRN    sodium chloride (NS) flush 5-10 mL  5-10 mL IntraVENous PRN    ondansetron (ZOFRAN) injection 4 mg  4 mg IntraVENous Q6H PRN  aspirin chewable tablet 81 mg  81 mg Oral DAILY    albuterol-ipratropium (DUO-NEB) 2.5 MG-0.5 MG/3 ML  3 mL Nebulization Q4H PRN    guaiFENesin (ROBITUSSIN) 100 mg/5 mL oral liquid 200 mg  200 mg Oral Q6H PRN    dronabinoL (MARINOL) capsule 2.5 mg  2.5 mg Oral ACB&D    mirtazapine (REMERON) tablet 30 mg  30 mg Oral QHS    insulin lispro (HUMALOG) injection   SubCUTAneous AC&HS    glucose chewable tablet 16 g  4 Tablet Oral PRN    glucagon (GLUCAGEN) injection 1 mg  1 mg IntraMUSCular PRN    pantoprazole (PROTONIX) tablet 40 mg  40 mg Oral ACB    senna-docusate (PERICOLACE) 8.6-50 mg per tablet 2 Tablet  2 Tablet Oral QHS    sertraline (ZOLOFT) tablet 25 mg  25 mg Oral DAILY    glatiramer (COPAXONE) injection 20 mg  20 mg SubCUTAneous Q MON, WED & FRI    0.9% sodium chloride infusion  125 mL/hr IntraVENous CONTINUOUS    L.acidophilus-paracasei-S.thermophil-bifidobacter (RISAQUAD) 8 billion cell capsule  1 Capsule Oral DAILY    enoxaparin (LOVENOX) injection 40 mg  40 mg SubCUTAneous DAILY        Exam:    General:   Resting, arousable cooperative,contracted   Eyes:  Sclera anicteric. Pupils equally round and reactive to light. Mouth/Throat: Mucous membranes normal, oral pharynx clear   Neck: Supple   Lungs:   Clear to auscultation bilaterally, good effort   CV:  Regular rate and rhythm,no murmur, click, rub or gallop   Abdomen:   Soft, non-tender. bowel sounds normal. non-distended   Extremities: No  edema   Skin: Skin color, texture, turgor normal. no acute rash or lesions   Lymph nodes: Cervical and supraclavicular normal   Musculoskeletal: No swelling or deformity   Lines/Devices:  Intact, no erythema, drainage or tenderness   Psych:  Cannot assess.        Data Reviewed:   CBC:   Recent Labs     02/15/22  0200 02/14/22  0546 02/13/22  0433   WBC 9.2 9.8 6.6   RBC 3.72* 3.72* 3.47*   HGB 8.7* 8.5* 8.1*   HCT 29.8* 29.2* 26.9*    355 272   GRANS 75 77* 72   LYMPH 13 11* 15   EOS 2 2 3 CMP:   Recent Labs     02/15/22  0200 02/14/22  0546 02/13/22  0433    97 107*    141 141   K 3.3* 3.5 3.3*    109* 112*   CO2 30 27 25   BUN 3* 4* 7   CREA 0.24* 0.24* 0.29*   CA 7.5* 7.7* 7.9*   AGAP 2* 5 4*   BUCR 13 17 24*       Lab Results   Component Value Date/Time    Culture result: NO GROWTH 3 DAYS 02/12/2022 03:42 AM    Culture result: NO GROWTH 4 DAYS 02/11/2022 11:04 AM    Culture result: LIGHT PROTEUS MIRABILIS (A) 02/10/2022 04:00 AM    Culture result: (A) 02/10/2022 04:00 AM     LIGHT STREPTOCOCCI, BETA HEMOLYTIC GROUP B Penicillin and ampicillin are drugs of choice for treatment of beta-hemolytic streptococcal infections. Susceptibility testing of penicillins and beta-lactams approved by the FDA for treatment of beta-hemolytic streptococcal infections need not be performed routinely, because nonsusceptible isolates are extremely rare. CLSI 2012    Culture result: LIGHT ALPHA STREPTOCOCCUS (A) 02/10/2022 04:00 AM    Culture result: (A) 02/10/2022 04:00 AM     MODERATE BACTEROIDES UNIFORMIS BETA LACTAMASE NEGATIVE          XR Results (most recent)reviewed:  Results from [place] encounter on 02/09/22    XR CHEST PORT    Narrative  EXAM:  XR CHEST PORT    INDICATION: Eval for infiltrate    COMPARISON: Chest radiographs 7/20/2016    TECHNIQUE: Semiupright portable chest AP view    FINDINGS:    Cardiac silhouette within normal limits. Aorta is tortuous. Several irregular  linear opacities in the right mid and upper lung, again may reflect fibrosis. No  focal consolidation. No definite pleural effusion or pneumothorax. Impression  No consolidative pneumonia. Redemonstrated suspected right lung  fibrotic changes. ICD-10-CM ICD-9-CM    1. Cellulitis of buttock  L03.317 682.5    2. Acute osteomyelitis of pelvic region, unspecified laterality ([facility]  M86.159 730.05    3. Catheter cystitis, initial encounter ([facility]  T83.518A 996.64     N30.90 595.89    4.  Chronic cough  R05.3 786.2    5. Esophageal dysphagia  R13.19 787.29    6. Functional quadriplegia ([facility])  R53.2 780.72    7. Gram-negative bacteremia  R78.81 790.7      041.85    8. Gram-negative infection  A49.9 041.85    9. Multiple sclerosis ([facility]  G35 340    10. Neurogenic bladder  N31.9 596.54    11. Sacral osteomyelitis ([facility])  M46.28 730.28    12. Streptococcal bacteremia  R78.81 790.7     B95.5 041.00    13. Pressure injury of sacral region, stage 4 ([facility])  L89.154 707.03      707.24    14. Bacteremia due to Gram-negative bacteria  R78.81 790.7      041.85    15. Chronic osteomyelitis ([facility]  M86.60 730.10    16. Severe protein-calorie malnutrition ([facility]  E43 262    17. Urinary tract infection due to Proteus  N39.0 599.0     B96.4 041. 6            Antibiotic History  Ceftriaxone, Flagyl  S/p vancomycin, cefepime, Flagyl   I have discussed the diagnosis with the patient and niece & the intended plan as seen in the above orders. I have discussed medication side effects and warnings with the patient & niece. Reviewed test results at length with patient & niece.     Signed By: Sha León MD [facility]

## 2022-02-15 NOTE — PROGRESS NOTES
CARDIOLOGY Progress Note    Patient ID:  Patient: Emily Cruz  MRN: [id number]  Age: 80 y.o.  : 1950    Date of  Admission: 2022  6:34 PM   PCP:  Jones Walker MD    Assessment: 1. Atrial fibrillation with RVR, paroxysmal.  Back in sinus. 2. CAD with remote coronary stenting. No angina. 3. Normal LV systolic function on echo EF 60%. 4. 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 Strep agalactiae and Proteus on BCX, Proteus UTI. 5. Sepsis, improving. Multiple sites for bacterial entry. 6. Multiple sclerosis with severe debility (functional quadriplegic). 7. Remote stroke in .  8. Full code. Plan:     1. Continue digoxin for rate control. 2. Didn't initially have BP to tolerate metoprolol. OK with midodrine. 3. Amiodarone changed to oral 400 mg po BID.  4. On DVT prophylaxis, not a good chronic full anticoagulation candidate due to bleeding risk, anemia, etc.  I'll defer to the primary team on if and when anticoagulation dosing is escalated. Since he's back in sinus, no BRITANY-cardioversion needed. BRITANY requested to evaluate the mitral valve finding, on for Friday at 10 am in the noninvasive lab. [x]       High complexity decision making was performed in this patient at high risk for decompensation with multiple organ involvement. Emily Cruz is a 80 y.o. male with a history of MS. He was admitted with sepsis and found to have bacteremia from both  Yaya Kistler and GNR. On antibiotics. His echo at the bedside today shows normal LV systolic function, mild MR, and a probable ~1 cm vegetation on the mitral valve. His initial ECG on  shows sinus rhythm, on tele today he's in Afib. He got IV metoprolol and his BP dropped, back up some with IVF. He denies chest pain, syncope, palpitations, MATUTE, palpitations.   Febrile currently. Past Medical History:   Diagnosis Date    MS (multiple sclerosis) ([place] [address]     Stroke [facility])         Past Surgical History:   Procedure Laterality Date    HX CORONARY STENT PLACEMENT      x7        Social History     Tobacco Use    Smoking status: Never Smoker    Smokeless tobacco: Not on file   Substance Use Topics    Alcohol use: No        No family history on file.      Allergies   Allergen Reactions    Beeswax Anaphylaxis    Bee Pollen Unknown (comments)    Simvastatin Not Reported This Time    Tolterodine Not Reported This Time    Venom-Wasp Not Reported This Time          Current Facility-Administered Medications   Medication Dose Route Frequency    oxyCODONE IR (ROXICODONE) tablet 5 mg  5 mg Oral Q4H    acetaminophen (TYLENOL) tablet 1,000 mg  1,000 mg Oral TID    ampicillin-sulbactam (UNASYN) 3 g in 0.9% sodium chloride (MBP/ADV) 100 mL MBP  3 g IntraVENous Q6H    amiodarone (CORDARONE) tablet 400 mg  400 mg Oral BID    morphine injection 2 mg  2 mg IntraVENous Q4H PRN    midodrine (PROAMATINE) tablet 5 mg  5 mg Oral TID WITH MEALS    sodium hypochlorite (HALF STRENGTH DAKIN'S) 0.25% irrigation (bottle)   Topical BID    [Held by provider] metoprolol tartrate (LOPRESSOR) tablet 12.5 mg  12.5 mg Oral Q12H    balsam peru-castor oiL (VENELEX) ointment   Topical BID    ferrous sulfate tablet 325 mg  325 mg Oral EVERY OTHER DAY    lidocaine/EPINEPHrine/tetracaine (L.E.T) topical gel  3 mL Topical Q12H    digoxin (LANOXIN) tablet 0.125 mg  0.125 mg Oral DAILY    dextrose 10% infusion 125-250 mL  125-250 mL IntraVENous PRN    sodium chloride (NS) flush 5-10 mL  5-10 mL IntraVENous PRN    ondansetron (ZOFRAN) injection 4 mg  4 mg IntraVENous Q6H PRN    aspirin chewable tablet 81 mg  81 mg Oral DAILY    albuterol-ipratropium (DUO-NEB) 2.5 MG-0.5 MG/3 ML  3 mL Nebulization Q4H PRN    guaiFENesin (ROBITUSSIN) 100 mg/5 mL oral liquid 200 mg  200 mg Oral Q6H PRN    dronabinoL (MARINOL) capsule 2.5 mg  2.5 mg Oral ACB&D    mirtazapine (REMERON) tablet 30 mg  30 mg Oral QHS    insulin lispro (HUMALOG) injection   SubCUTAneous AC&HS    glucose chewable tablet 16 g  4 Tablet Oral PRN    glucagon (GLUCAGEN) injection 1 mg  1 mg IntraMUSCular PRN    pantoprazole (PROTONIX) tablet 40 mg  40 mg Oral ACB    senna-docusate (PERICOLACE) 8.6-50 mg per tablet 2 Tablet  2 Tablet Oral QHS    sertraline (ZOLOFT) tablet 25 mg  25 mg Oral DAILY    glatiramer (COPAXONE) injection 20 mg  20 mg SubCUTAneous Q MON, WED & FRI    0.9% sodium chloride infusion  125 mL/hr IntraVENous CONTINUOUS    L.acidophilus-paracasei-S.thermophil-bifidobacter (RISAQUAD) 8 billion cell capsule  1 Capsule Oral DAILY    enoxaparin (LOVENOX) injection 40 mg  40 mg SubCUTAneous DAILY       Review of Symptoms:    General: +fever, chills, sweats, weakness, weight loss   Ear Nose and Throat: negative for rhinorrhea, pharyngitis, otalgia, tinnitus, speech or swallowing difficulties   Respiratory: + chronic cough, negative for SOB, sputum production, wheezing, MATUTE, pleuritic pain   Cardiology: negative for chest pain, palpitations, orthopnea, PND, edema, syncope   Gastrointestinal: negative for abdominal pain, N/V, dysphagia   Genitourinary: +incontinence          Objective:      Physical Exam:  Temp (24hrs), Av.4 °F (36.9 °C), Min:97.9 °F (36.6 °C), Max:99.1 °F (37.3 °C)    Patient Vitals for the past 8 hrs:   Pulse   02/15/22 1209 78   02/15/22 1136 79   02/15/22 0947 87   02/15/22 0811 83    Patient Vitals for the past 8 hrs:   Resp   02/15/22 1136 20   02/15/22 0811 26    Patient Vitals for the past 8 hrs:   BP   02/15/22 1209 111/61   02/15/22 1136 131/61   02/15/22 0947 (!) 143/73   02/15/22 0811 (!) 140/69          Intake/Output Summary (Last 24 hours) at 2/15/2022 1544  Last data filed at 2/15/2022 1213  Gross per 24 hour   Intake 480 ml   Output 1475 ml   Net -995 ml       Nondiaphoretic, not in acute distress, slender male. No scleral icterus, mucous membranes moist, conjuctivae pink, no xanthelasma. Unlabored, clear to auscultation bilaterally anteriorly, symmetric air movement. Irregular rhythm, +soft systolic murmur, no rub. No peripheral edema. Palpable radial pulses bilaterally. Abdomen, soft, nontender, nondistended. Extremities without cyanosis or clubbing. Skin warm and dry. No rashes or ulcers. Neuro grossly nonfocal.  No tremor. Awake and appropriate. CARDIOGRAPHICS and STUDIES, I reviewed:    Telemetry:  Afib. ECG:  Afib. Echo:  Final read pending. Labs:  No results for input(s): CPK, CKMB, CKNDX, TROIQ in the last 72 hours. No lab exists for component: CPKMB  No results found for: CHOL, CHOLX, CHLST, CHOLV, HDL, HDLP, LDL, LDLC, DLDLP, TGLX, TRIGL, TRIGP, CHHD, CHHDX  No results for input(s): INR, PTP, APTT, INREXT, INREXT in the last 72 hours. Recent Labs     02/15/22  0200 02/14/22  0546 02/13/22  0433    141 141   K 3.3* 3.5 3.3*    109* 112*   CO2 30 27 25   BUN 3* 4* 7   CREA 0.24* 0.24* 0.29*    97 107*   PHOS 2.3* 2.2* 2.5*   CA 7.5* 7.7* 7.9*   WBC 9.2 9.8 6.6   HGB 8.7* 8.5* 8.1*   HCT 29.8* 29.2* 26.9*    355 272     No results for input(s): AP, TBIL, TP, ALB, GLOB, GGT, AML, LPSE in the last 72 hours. No lab exists for component: SGOT, GPT, AMYP, HLPSE  No components found for: GLPOC  No results for input(s): PH, PCO2, PO2 in the last 72 hours.         Demetrice Morin MD  2/15/2022

## 2022-02-15 NOTE — WOUND CARE
Wound Care Reassessment for the Pressure injuries to the ischial x 2 and the sacrum and left outer buttock. Patient has painful wounds but the most painful seems to be the right lateral ankle wound (but it is the cleanest). Assessment / Treatment / plan: All of the wounds are draining less and there is no longer any odor to any of the wounds. The DTI on the left outer buttocks is opening slightly. The moist eschar in the left wound is imbedded in the deep edge and cannot be debrided mechanically at the bedside. The right ischial ulcer is denser eschar and will possibly loosen better with just moist packing using hydrofera Blue dressings for the next week. The sacrum ulcer is the deepest wound with regard to undermining size but it also appears to be the most painful. All of the wounds were packed / dressed with NS moist Hydrofera Blue and covered with foam dressings. Wound care to decrease to daily now. Continue the active wound therapy with the Envella bed. Reassessment by the wound care team will be next Tuesday.    Zak Barth RN, BSN, [employer]

## 2022-02-15 NOTE — PROGRESS NOTES
0700: End of Shift Note    Bedside shift change report given to Daksha Jj RN (oncoming nurse) by Benita Rai (offgoing nurse). Report included the following information SBAR, Kardex, Intake/Output, MAR, Recent Results and Cardiac Rhythm NSR    Shift worked:  Night     Shift summary and any significant changes:     Wound care done overnight     Concerns for physician to address:       Zone phone for oncoming shift:          Activity:  Activity Level: Bed Rest  Number times ambulated in hallways past shift: 0  Number of times OOB to chair past shift: 0    Cardiac:   Cardiac Monitoring: Yes      Cardiac Rhythm: Sinus Rhythm    Access:   Current line(s): PIV     Genitourinary:   Urinary status: mauro    Respiratory:   O2 Device: Nasal cannula  Chronic home O2 use?: NO  Incentive spirometer at bedside: YES     GI:  Last Bowel Movement Date: 02/12/22  Current diet:  ADULT DIET Regular  ADULT ORAL NUTRITION SUPPLEMENT Breakfast, Dinner; Renal Supplement  Passing flatus: YES  Tolerating current diet: YES       Pain Management:   Patient states pain is manageable on current regimen: YES    Skin:  Lewis Score: 10  Interventions: increase time out of bed and PT/OT consult    Patient Safety:  Fall Score:  Total Score: 3  Interventions: bed/chair alarm, gripper socks and pt to call before getting OOB  High Fall Risk: Yes    Length of Stay:  Expected LOS: - - -  Actual LOS: 6      Valentina Burrell

## 2022-02-15 NOTE — PROGRESS NOTES
Problem: Pressure Injury - Risk of  Goal: *Prevention of pressure injury  Description: Document Lewis Scale and appropriate interventions in the flowsheet.   Outcome: Progressing Towards Goal  Note: Pressure Injury Interventions:  Sensory Interventions: Assess changes in LOC,Minimize linen layers,Maintain/enhance activity level    Moisture Interventions: Absorbent underpads,Minimize layers    Activity Interventions: Pressure redistribution bed/mattress(bed type),PT/OT evaluation    Mobility Interventions: Pressure redistribution bed/mattress (bed type),PT/OT evaluation    Nutrition Interventions: Document food/fluid/supplement intake    Friction and Shear Interventions: Minimize layers

## 2022-02-16 LAB
ANION GAP SERPL CALC-SCNC: 4 MMOL/L (ref 5–15)
BACTERIA SPEC CULT: NORMAL
BASOPHILS # BLD: 0 K/UL (ref 0–0.1)
BASOPHILS NFR BLD: 0 % (ref 0–1)
BUN SERPL-MCNC: 3 MG/DL (ref 6–20)
BUN/CREAT SERPL: 12 (ref 12–20)
CALCIUM SERPL-MCNC: 8.2 MG/DL (ref 8.5–10.1)
CHLORIDE SERPL-SCNC: 106 MMOL/L (ref 97–108)
CO2 SERPL-SCNC: 34 MMOL/L (ref 21–32)
CREAT SERPL-MCNC: 0.26 MG/DL (ref 0.7–1.3)
DIFFERENTIAL METHOD BLD: ABNORMAL
EOSINOPHIL # BLD: 0.2 K/UL (ref 0–0.4)
EOSINOPHIL NFR BLD: 2 % (ref 0–7)
ERYTHROCYTE [DISTWIDTH] IN BLOOD BY AUTOMATED COUNT: 17.9 % (ref 11.5–14.5)
GLUCOSE BLD STRIP.AUTO-MCNC: 107 MG/DL (ref 65–117)
GLUCOSE BLD STRIP.AUTO-MCNC: 146 MG/DL (ref 65–117)
GLUCOSE BLD STRIP.AUTO-MCNC: 159 MG/DL (ref 65–117)
GLUCOSE BLD STRIP.AUTO-MCNC: 99 MG/DL (ref 65–117)
GLUCOSE SERPL-MCNC: 98 MG/DL (ref 65–100)
HCT VFR BLD AUTO: 30.9 % (ref 36.6–50.3)
HGB BLD-MCNC: 8.9 G/DL (ref 12.1–17)
IMM GRANULOCYTES # BLD AUTO: 0.1 K/UL (ref 0–0.04)
IMM GRANULOCYTES NFR BLD AUTO: 1 % (ref 0–0.5)
LYMPHOCYTES # BLD: 1.1 K/UL (ref 0.8–3.5)
LYMPHOCYTES NFR BLD: 10 % (ref 12–49)
MAGNESIUM SERPL-MCNC: 2.2 MG/DL (ref 1.6–2.4)
MCH RBC QN AUTO: 22.8 PG (ref 26–34)
MCHC RBC AUTO-ENTMCNC: 28.8 G/DL (ref 30–36.5)
MCV RBC AUTO: 79.2 FL (ref 80–99)
MONOCYTES # BLD: 0.7 K/UL (ref 0–1)
MONOCYTES NFR BLD: 7 % (ref 5–13)
NEUTS SEG # BLD: 8.5 K/UL (ref 1.8–8)
NEUTS SEG NFR BLD: 80 % (ref 32–75)
NRBC # BLD: 0 K/UL (ref 0–0.01)
NRBC BLD-RTO: 0 PER 100 WBC
PHOSPHATE SERPL-MCNC: 2.7 MG/DL (ref 2.6–4.7)
PLATELET # BLD AUTO: 416 K/UL (ref 150–400)
PMV BLD AUTO: 9.5 FL (ref 8.9–12.9)
POTASSIUM SERPL-SCNC: 3.5 MMOL/L (ref 3.5–5.1)
RBC # BLD AUTO: 3.9 M/UL (ref 4.1–5.7)
RBC MORPH BLD: ABNORMAL
SERVICE CMNT-IMP: ABNORMAL
SERVICE CMNT-IMP: ABNORMAL
SERVICE CMNT-IMP: NORMAL
SODIUM SERPL-SCNC: 144 MMOL/L (ref 136–145)
WBC # BLD AUTO: 10.6 K/UL (ref 4.1–11.1)

## 2022-02-16 PROCEDURE — 74011250637 HC RX REV CODE- 250/637: Performed by: INTERNAL MEDICINE

## 2022-02-16 PROCEDURE — 36415 COLL VENOUS BLD VENIPUNCTURE: CPT

## 2022-02-16 PROCEDURE — 65660000000 HC RM CCU STEPDOWN

## 2022-02-16 PROCEDURE — 84100 ASSAY OF PHOSPHORUS: CPT

## 2022-02-16 PROCEDURE — 74011250636 HC RX REV CODE- 250/636: Performed by: INTERNAL MEDICINE

## 2022-02-16 PROCEDURE — 74011000258 HC RX REV CODE- 258: Performed by: INTERNAL MEDICINE

## 2022-02-16 PROCEDURE — 74011000250 HC RX REV CODE- 250: Performed by: INTERNAL MEDICINE

## 2022-02-16 PROCEDURE — 82962 GLUCOSE BLOOD TEST: CPT

## 2022-02-16 PROCEDURE — 99231 SBSQ HOSP IP/OBS SF/LOW 25: CPT | Performed by: NURSE PRACTITIONER

## 2022-02-16 PROCEDURE — 74011250637 HC RX REV CODE- 250/637: Performed by: NURSE PRACTITIONER

## 2022-02-16 PROCEDURE — 74011636637 HC RX REV CODE- 636/637: Performed by: HOSPITALIST

## 2022-02-16 PROCEDURE — 74011250636 HC RX REV CODE- 250/636: Performed by: HOSPITALIST

## 2022-02-16 PROCEDURE — 74011000250 HC RX REV CODE- 250: Performed by: EMERGENCY MEDICINE

## 2022-02-16 PROCEDURE — 85025 COMPLETE CBC W/AUTO DIFF WBC: CPT

## 2022-02-16 PROCEDURE — 74011250637 HC RX REV CODE- 250/637: Performed by: HOSPITALIST

## 2022-02-16 PROCEDURE — 83735 ASSAY OF MAGNESIUM: CPT

## 2022-02-16 PROCEDURE — 77010033678 HC OXYGEN DAILY

## 2022-02-16 PROCEDURE — 80048 BASIC METABOLIC PNL TOTAL CA: CPT

## 2022-02-16 RX ORDER — OXYCODONE HYDROCHLORIDE 5 MG/1
5 TABLET ORAL EVERY 6 HOURS
Status: DISCONTINUED | OUTPATIENT
Start: 2022-02-16 | End: 2022-02-18 | Stop reason: HOSPADM

## 2022-02-16 RX ADMIN — DRONABINOL 2.5 MG: 2.5 CAPSULE ORAL at 15:57

## 2022-02-16 RX ADMIN — PANTOPRAZOLE SODIUM 40 MG: 40 TABLET, DELAYED RELEASE ORAL at 09:07

## 2022-02-16 RX ADMIN — AMPICILLIN SODIUM AND SULBACTAM SODIUM 3 G: 2; 1 INJECTION, POWDER, FOR SOLUTION INTRAMUSCULAR; INTRAVENOUS at 09:06

## 2022-02-16 RX ADMIN — Medication 3 ML: at 09:09

## 2022-02-16 RX ADMIN — SODIUM CHLORIDE 125 ML/HR: 9 INJECTION, SOLUTION INTRAVENOUS at 06:10

## 2022-02-16 RX ADMIN — CASTOR OIL AND BALSAM, PERU: 788; 87 OINTMENT TOPICAL at 21:45

## 2022-02-16 RX ADMIN — ACETAMINOPHEN 1000 MG: 500 TABLET ORAL at 09:07

## 2022-02-16 RX ADMIN — CASTOR OIL AND BALSAM, PERU: 788; 87 OINTMENT TOPICAL at 09:07

## 2022-02-16 RX ADMIN — AMPICILLIN SODIUM AND SULBACTAM SODIUM 3 G: 2; 1 INJECTION, POWDER, FOR SOLUTION INTRAMUSCULAR; INTRAVENOUS at 21:44

## 2022-02-16 RX ADMIN — MIDODRINE HYDROCHLORIDE 5 MG: 5 TABLET ORAL at 17:47

## 2022-02-16 RX ADMIN — MIRTAZAPINE 30 MG: 15 TABLET, FILM COATED ORAL at 21:43

## 2022-02-16 RX ADMIN — Medication 1 CAPSULE: at 09:06

## 2022-02-16 RX ADMIN — AMIODARONE HYDROCHLORIDE 400 MG: 200 TABLET ORAL at 17:47

## 2022-02-16 RX ADMIN — Medication 3 ML: at 21:57

## 2022-02-16 RX ADMIN — SERTRALINE 25 MG: 50 TABLET, FILM COATED ORAL at 09:06

## 2022-02-16 RX ADMIN — OXYCODONE 5 MG: 5 TABLET ORAL at 00:48

## 2022-02-16 RX ADMIN — DIGOXIN 0.12 MG: 125 TABLET ORAL at 09:06

## 2022-02-16 RX ADMIN — SODIUM CHLORIDE 50 ML/HR: 9 INJECTION, SOLUTION INTRAVENOUS at 23:33

## 2022-02-16 RX ADMIN — ASPIRIN 81 MG CHEWABLE TABLET 81 MG: 81 TABLET CHEWABLE at 09:06

## 2022-02-16 RX ADMIN — DRONABINOL 2.5 MG: 2.5 CAPSULE ORAL at 09:07

## 2022-02-16 RX ADMIN — OXYCODONE 5 MG: 5 TABLET ORAL at 13:23

## 2022-02-16 RX ADMIN — ACETAMINOPHEN 1000 MG: 500 TABLET ORAL at 15:57

## 2022-02-16 RX ADMIN — AMPICILLIN SODIUM AND SULBACTAM SODIUM 3 G: 2; 1 INJECTION, POWDER, FOR SOLUTION INTRAMUSCULAR; INTRAVENOUS at 15:57

## 2022-02-16 RX ADMIN — Medication 2 UNITS: at 17:47

## 2022-02-16 RX ADMIN — ENOXAPARIN SODIUM 40 MG: 100 INJECTION SUBCUTANEOUS at 09:07

## 2022-02-16 RX ADMIN — OXYCODONE 5 MG: 5 TABLET ORAL at 09:07

## 2022-02-16 RX ADMIN — OXYCODONE 5 MG: 5 TABLET ORAL at 16:00

## 2022-02-16 RX ADMIN — ACETAMINOPHEN 1000 MG: 500 TABLET ORAL at 21:44

## 2022-02-16 RX ADMIN — OXYCODONE 5 MG: 5 TABLET ORAL at 04:43

## 2022-02-16 RX ADMIN — AMPICILLIN SODIUM AND SULBACTAM SODIUM 3 G: 2; 1 INJECTION, POWDER, FOR SOLUTION INTRAMUSCULAR; INTRAVENOUS at 04:43

## 2022-02-16 RX ADMIN — AMIODARONE HYDROCHLORIDE 400 MG: 200 TABLET ORAL at 09:06

## 2022-02-16 RX ADMIN — MIDODRINE HYDROCHLORIDE 5 MG: 5 TABLET ORAL at 09:07

## 2022-02-16 RX ADMIN — SODIUM CHLORIDE, PRESERVATIVE FREE 10 ML: 5 INJECTION INTRAVENOUS at 21:44

## 2022-02-16 RX ADMIN — DOCUSATE SODIUM 50 MG AND SENNOSIDES 8.6 MG 2 TABLET: 8.6; 5 TABLET, FILM COATED ORAL at 21:44

## 2022-02-16 RX ADMIN — OXYCODONE 5 MG: 5 TABLET ORAL at 21:43

## 2022-02-16 RX ADMIN — GLATIRAMER ACETATE 20 MG: 20 INJECTION, SOLUTION SUBCUTANEOUS at 21:44

## 2022-02-16 RX ADMIN — MIDODRINE HYDROCHLORIDE 5 MG: 5 TABLET ORAL at 13:23

## 2022-02-16 NOTE — PROGRESS NOTES
Problem: Pressure Injury - Risk of  Goal: *Prevention of pressure injury  Description: Document Lewis Scale and appropriate interventions in the flowsheet. Outcome: Progressing Towards Goal  Note: Pressure Injury Interventions:  Sensory Interventions: Assess changes in LOC,Float heels,Keep linens dry and wrinkle-free,Minimize linen layers,Maintain/enhance activity level    Moisture Interventions: Absorbent underpads,Maintain skin hydration (lotion/cream),Internal/External urinary devices,Check for incontinence Q2 hours and as needed,Apply protective barrier, creams and emollients,Moisture barrier,Minimize layers    Activity Interventions: Increase time out of bed,Pressure redistribution bed/mattress(bed type)    Mobility Interventions: PT/OT evaluation,Pressure redistribution bed/mattress (bed type)    Nutrition Interventions: Offer support with meals,snacks and hydration,Document food/fluid/supplement intake    Friction and Shear Interventions: Apply protective barrier, creams and emollients,HOB 30 degrees or less,Lift team/patient mobility team,Minimize layers                Problem: Falls - Risk of  Goal: *Absence of Falls  Description: Document Bianca Fall Risk and appropriate interventions in the flowsheet.   Outcome: Progressing Towards Goal  Note: Fall Risk Interventions:  Mobility Interventions: Bed/chair exit alarm,Communicate number of staff needed for ambulation/transfer,Patient to call before getting OOB    Mentation Interventions: Adequate sleep, hydration, pain control,Bed/chair exit alarm,More frequent rounding,Reorient patient    Medication Interventions: Bed/chair exit alarm,Patient to call before getting OOB,Teach patient to arise slowly    Elimination Interventions: Bed/chair exit alarm,Call light in reach,Patient to call for help with toileting needs,Toileting schedule/hourly rounds

## 2022-02-16 NOTE — PROGRESS NOTES
Problem: Pressure Injury - Risk of  Goal: *Prevention of pressure injury  Description: Document Lewis Scale and appropriate interventions in the flowsheet. Outcome: Progressing Towards Goal  Note: Pressure Injury Interventions:  Sensory Interventions: Assess changes in LOC,Float heels,Keep linens dry and wrinkle-free,Minimize linen layers,Maintain/enhance activity level    Moisture Interventions: Absorbent underpads,Maintain skin hydration (lotion/cream),Internal/External urinary devices,Check for incontinence Q2 hours and as needed,Apply protective barrier, creams and emollients,Moisture barrier,Minimize layers    Activity Interventions: Increase time out of bed,Pressure redistribution bed/mattress(bed type)    Mobility Interventions: PT/OT evaluation,Pressure redistribution bed/mattress (bed type)    Nutrition Interventions: Offer support with meals,snacks and hydration,Document food/fluid/supplement intake    Friction and Shear Interventions: Apply protective barrier, creams and emollients,HOB 30 degrees or less,Lift team/patient mobility team,Minimize layers                Problem: Patient Education: Go to Patient Education Activity  Goal: Patient/Family Education  Outcome: Progressing Towards Goal     Problem: Falls - Risk of  Goal: *Absence of Falls  Description: Document Bianca Fall Risk and appropriate interventions in the flowsheet.   Outcome: Progressing Towards Goal  Note: Fall Risk Interventions:  Mobility Interventions: Bed/chair exit alarm,Patient to call before getting OOB,Communicate number of staff needed for ambulation/transfer    Mentation Interventions: Bed/chair exit alarm,Adequate sleep, hydration, pain control,Reorient patient,More frequent rounding    Medication Interventions: Bed/chair exit alarm,Patient to call before getting OOB,Teach patient to arise slowly    Elimination Interventions: Bed/chair exit alarm,Call light in reach,Patient to call for help with toileting needs,Toileting schedule/hourly rounds              Problem: Patient Education: Go to Patient Education Activity  Goal: Patient/Family Education  Outcome: Progressing Towards Goal     Problem: General Wound Care  Goal: *Infected Wound: Prevention of further infection and promotion of healing  Description: Infection control procedures (eg: clean dressings, clean gloves, hand washing, precautions to isolate wound from contamination, sterile instruments used for wound debridement) should be implemented. Outcome: Progressing Towards Goal  Goal: Interventions  Outcome: Progressing Towards Goal     Problem: Diabetes Self-Management  Goal: *Disease process and treatment process  Description: Define diabetes and identify own type of diabetes; list 3 options for treating diabetes. Outcome: Progressing Towards Goal  Goal: *Incorporating nutritional management into lifestyle  Description: Describe effect of type, amount and timing of food on blood glucose; list 3 methods for planning meals. Outcome: Progressing Towards Goal  Goal: *Monitoring blood glucose, interpreting and using results  Description: Identify recommended blood glucose targets  and personal targets. Outcome: Progressing Towards Goal  Goal: *Prevention, detection, treatment of acute complications  Description: List symptoms of hyper- and hypoglycemia; describe how to treat low blood sugar and actions for lowering  high blood glucose level.   Outcome: Progressing Towards Goal

## 2022-02-16 NOTE — PROGRESS NOTES
End of Shift Note    Bedside shift change report given to [address] [place] [address] (oncoming nurse) by Talya Andrea (offgoing nurse). Report included the following information SBAR, Kardex and MAR    Shift worked:  Night     Shift summary and any significant changes:     No changes overnight. Concerns for physician to address:       Zone phone for oncoming shift:   [phone]       Activity:  Activity Level: Bed Rest  Number times ambulated in hallways past shift: 0  Number of times OOB to chair past shift: 0    Cardiac:   Cardiac Monitoring: Yes      Cardiac Rhythm: Sinus Rhythm    Access:   Current line(s): PIV     Genitourinary:   Urinary status: voiding    Respiratory:   O2 Device: Nasal cannula  Chronic home O2 use?: NO  Incentive spirometer at bedside: YES     GI:  Last Bowel Movement Date: 02/10/22  Current diet:  ADULT DIET Regular  ADULT ORAL NUTRITION SUPPLEMENT Breakfast, Dinner; Renal Supplement  DIET NPO  Passing flatus: YES  Tolerating current diet: YES       Pain Management:   Patient states pain is manageable on current regimen: YES    Skin:  Lewis Score: 10  Interventions: increase time out of bed and internal/external urinary devices    Patient Safety:  Fall Score:  Total Score: 3  Interventions: bed/chair alarm  High Fall Risk: Yes    Length of Stay:  Expected LOS: 4d 19h  Actual LOS: [address]

## 2022-02-16 NOTE — PROGRESS NOTES
Transition of Care Plan:  RUR: 19% moderate  Disposition: return to [facility] for LTC  Follow up appointments: PCP, speciality   DME needed: none anticipated   Transportation at [address] or means to access home: n/a     IM Medicare Letter: to be provided  Is patient a BCPI-A Bundle: no              If yes, was Bundle Letter given?:    Is patient a  and connected with the [facility]?  yes              If yes, was [employer] transfer form completed and [facility] notified? VA has been notified, waiting on MD to complete his portion of transfer paperwork  Dick [phone]  Discharge Caregiver contacted prior to [address] needed?: not at this time      Initial note:  Chart reviewed. CM spoke wit Ty Glasgow in admissions at [facility] [address].. They are working on securing a air fluidized bed for this pt. They will closed on 2/21 for Presidents day and will be unable to accept the pt on that date. They will need a chest xray and negative rapid Covid test 24 hrs prior to his return. CM notified him the pts return with [facility] ab[facility]. Referral sent via [employer]. CM to continue to monitor for d/c needs.     Shelby Win, MSN  Care Manager  [phone]

## 2022-02-16 NOTE — PROGRESS NOTES
CARDIOLOGY Progress Note    Patient ID:  Patient: Emily Cruz  MRN: [id number]  Age: 80 y.o.  : 1950    Date of  Admission: 2022  6:34 PM   PCP:  Jones Walker MD    Assessment: 1. Atrial fibrillation with RVR, paroxysmal.  Back in sinus. 2. CAD with remote coronary stenting. No angina. 3. Normal LV systolic function on echo EF 60%. 4. 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 Strep agalactiae and Proteus on BCX, Proteus UTI. 5. Sepsis, improving. Multiple sites for bacterial entry. 6. Multiple sclerosis with severe debility (functional quadriplegic). 7. Remote stroke in .  8. Full code. Plan:     1. Continue digoxin for rate control. 2. Didn't initially have BP to tolerate metoprolol. 3. OK with midodrine. 4. Amiodarone oral load 400 mg po BID.  5. On DVT prophylaxis, not a good chronic full anticoagulation candidate due to bleeding risk, anemia, etc.  I'll defer to the primary team on if and when anticoagulation dosing is escalated. Since he's back in sinus, no BRITANY-cardioversion needed. BRITANY requested to evaluate the mitral valve finding, he is on for Friday at 10 am in the noninvasive lab IF absolutely needed as his procedural risk is higher given his state including respiratory status. [x]       High complexity decision making was performed in this patient at high risk for decompensation with multiple organ involvement. Emily Cruz is a 80 y.o. male with a history of MS. He was admitted with sepsis and found to have bacteremia from both  [place] and GNR. On antibiotics. His echo at the bedside today shows normal LV systolic function, mild MR, and a probable ~1 cm vegetation on the mitral valve. His initial ECG on  shows sinus rhythm, on tele today he's in Afib.   He got IV metoprolol and his BP dropped, back up some with IVF. TODAY:  He denies chest pain, syncope, palpitations, MATUTE, palpitations. Eating better. No family history on file.      Allergies   Allergen Reactions    Beeswax Anaphylaxis    Bee Pollen Unknown (comments)    Simvastatin Not Reported This Time    Tolterodine Not Reported This Time    Venom-Wasp Not Reported This Time          Current Facility-Administered Medications   Medication Dose Route Frequency    oxyCODONE IR (ROXICODONE) tablet 5 mg  5 mg Oral Q6H    acetaminophen (TYLENOL) tablet 1,000 mg  1,000 mg Oral TID    ampicillin-sulbactam (UNASYN) 3 g in 0.9% sodium chloride (MBP/ADV) 100 mL MBP  3 g IntraVENous Q6H    amiodarone (CORDARONE) tablet 400 mg  400 mg Oral BID    morphine injection 2 mg  2 mg IntraVENous Q4H PRN    midodrine (PROAMATINE) tablet 5 mg  5 mg Oral TID WITH MEALS    [Held by provider] metoprolol tartrate (LOPRESSOR) tablet 12.5 mg  12.5 mg Oral Q12H    balsam peru-castor oiL (VENELEX) ointment   Topical BID    ferrous sulfate tablet 325 mg  325 mg Oral EVERY OTHER DAY    lidocaine/EPINEPHrine/tetracaine (L.E.T) topical gel  3 mL Topical Q12H    digoxin (LANOXIN) tablet 0.125 mg  0.125 mg Oral DAILY    dextrose 10% infusion 125-250 mL  125-250 mL IntraVENous PRN    sodium chloride (NS) flush 5-10 mL  5-10 mL IntraVENous PRN    ondansetron (ZOFRAN) injection 4 mg  4 mg IntraVENous Q6H PRN    aspirin chewable tablet 81 mg  81 mg Oral DAILY    albuterol-ipratropium (DUO-NEB) 2.5 MG-0.5 MG/3 ML  3 mL Nebulization Q4H PRN    guaiFENesin (ROBITUSSIN) 100 mg/5 mL oral liquid 200 mg  200 mg Oral Q6H PRN    dronabinoL (MARINOL) capsule 2.5 mg  2.5 mg Oral ACB&D    mirtazapine (REMERON) tablet 30 mg  30 mg Oral QHS    insulin lispro (HUMALOG) injection   SubCUTAneous AC&HS    glucose chewable tablet 16 g  4 Tablet Oral PRN    glucagon (GLUCAGEN) injection 1 mg  1 mg IntraMUSCular PRN    pantoprazole (PROTONIX) tablet 40 mg  40 mg Oral ACB    senna-docusate (PERICOLACE) 8.6-50 mg per tablet 2 Tablet  2 Tablet Oral QHS    sertraline (ZOLOFT) tablet 25 mg  25 mg Oral DAILY    glatiramer (COPAXONE) injection 20 mg  20 mg SubCUTAneous Q MON, WED & FRI    0.9% sodium chloride infusion  50 mL/hr IntraVENous CONTINUOUS    L.acidophilus-paracasei-S.thermophil-bifidobacter (RISAQUAD) 8 billion cell capsule  1 Capsule Oral DAILY    enoxaparin (LOVENOX) injection 40 mg  40 mg SubCUTAneous DAILY       Review of Symptoms:    General: +fever, chills, sweats, weakness, weight loss   Ear Nose and Throat: negative for rhinorrhea, pharyngitis, otalgia, tinnitus, speech or swallowing difficulties   Respiratory: + chronic cough, negative for SOB, sputum production, wheezing, MATUTE, pleuritic pain   Cardiology: negative for chest pain, palpitations, orthopnea, PND, edema, syncope   Gastrointestinal: negative for abdominal pain, N/V, dysphagia   Genitourinary: +incontinence      Objective:      Physical Exam:  Temp (24hrs), Av.6 °F (37 °C), Min:98.1 °F (36.7 °C), Max:99.9 °F (37.7 °C)    Patient Vitals for the past 8 hrs:   Pulse   22 1440 73   22 1323 75    Patient Vitals for the past 8 hrs:   Resp   22 1440 22    Patient Vitals for the past 8 hrs:   BP   22 1440 (!) 120/57   22 1323 102/63          Intake/Output Summary (Last 24 hours) at 2022 1736  Last data filed at 2022 0332  Gross per 24 hour   Intake 120 ml   Output 700 ml   Net -580 ml       Nondiaphoretic, not in acute distress, slender male. Unlabored, clear to auscultation bilaterally anteriorly, symmetric air movement. Irregular rhythm, +soft systolic murmur, no rub. No peripheral edema. Palpable radial pulses bilaterally. Abdomen, soft, nontender, nondistended. Extremities without cyanosis or clubbing. Skin warm and dry. No rashes or ulcers. Neuro grossly nonfocal.  No tremor. Awake and appropriate.     CARDIOGRAPHICS and STUDIES, I reviewed:    Telemetry: Sinus rhythm. Labs:  No results for input(s): CPK, CKMB, CKNDX, TROIQ in the last 72 hours. No lab exists for component: CPKMB  No results found for: CHOL, CHOLX, CHLST, CHOLV, HDL, HDLP, LDL, LDLC, DLDLP, TGLX, TRIGL, TRIGP, CHHD, CHHDX  No results for input(s): INR, PTP, APTT, INREXT, INREXT in the last 72 hours. Recent Labs     02/16/22  0447 02/15/22  0200 02/14/22  0546    140 141   K 3.5 3.3* 3.5    108 109*   CO2 34* 30 27   BUN 3* 3* 4*   CREA 0.26* 0.24* 0.24*   GLU 98 100 97   PHOS 2.7 2.3* 2.2*   CA 8.2* 7.5* 7.7*   WBC 10.6 9.2 9.8   HGB 8.9* 8.7* 8.5*   HCT 30.9* 29.8* 29.2*   * 354 355     No results for input(s): AP, TBIL, TP, ALB, GLOB, GGT, AML, LPSE in the last 72 hours. No lab exists for component: SGOT, GPT, AMYP, HLPSE  No components found for: GLPOC  No results for input(s): PH, PCO2, PO2 in the last 72 hours.         Tuan Riggins MD  2/16/2022

## 2022-02-16 NOTE — PROGRESS NOTES
Palliative Medicine Consult  Mike: [phone])    Patient Name: Nomi Dc  YOB: 1950    Date of Initial Consult: 2/14/22  Reason for Consult: Other- acute on chronic pain  Requesting Provider: Darwin Randall MD  Primary Care Physician: Janet Waite MD     SUMMARY:   Nomi Dc is a 80 y.o. with a past history of MS, CVA, BPH, HLD, Pressure Ulcers, PE, Malnutrition, Functional Quad, and CAD, who was admitted on 2/9/2022 from home with a diagnosis of sepsis 2/2 infected sacral and left ischium pressure ulcers with a history of chronic osteomyelitis. 2/14:  Earlier today he had some sharp debridement of the necrotic tissue on his sacral ulcer. Overnight his pain was more severe than he was able to tolerate, even with a chronic pain that he lives with all the time. Rx: oxycodone 5 mg PO every 4 hours scheduled, and morphine 2mg IV every 4 hours prn. Goals of care are clear with full code and interventions. AMD on file appointing brother and niece as primary and secondary mPOAs. PALLIATIVE DIAGNOSES:   1. Frailty  2. Acute on chronic pain  3. Physical debility  4. Anorexia  5. Fatigue  6. Palliative Encounter     PLAN:   1. I spoke to the patient at the beside today. He accepted all scheduled doses of pain medications since our conversation yesterday including PO oxycodone and tylenol, and prn IV morphine with his dressing change this morning. He was sleepy during our encounter but oriented and engaged in conversation. He stated his pain a 3/10, and further stated that it's \"a little worse\" than what his chronic baseline pain is. Of note, he previously described his chronic baseline pain 5/10. I inquired as to whether a 3/10 was an acceptable pain level, given his mild lethargy, and he said yes. We discussed the option of increasing the oxycodone interval to every 6 hours from the current order of every 4 hours, and he declined, having now achieved adequate pain control. 2. Initial consult note routed to primary continuity provider and/or primary health care team members    3. Communicated plan of care with: Palliative [facility] Team     GOALS OF CARE / TREATMENT PREFERENCES:     GOALS OF CARE:  Patient/Health Care Proxy Stated Goals: Prolong life    TREATMENT PREFERENCES:   Code Status: Full Code    Advance Care Planning:  [x] The [facility] Interdisciplinary Team has updated the ACP Navigator with Health Care Decision Maker and Patient Capacity      Primary Decision MakerPrinceton Escort - Brother - [phone]    Secondary Decision Maker: Brenda Biggs - Other Relative - [phone]  Advance Care Planning 2/12/2022   Confirm Advance Directive Yes, on file       Medical Interventions: Full interventions       Other:    As far as possible, the palliative care team has discussed with patient / health care proxy about goals of care / treatment preferences for patient.      HISTORY:     History obtained from: chart, and patient     CHIEF COMPLAINT: pain to sacral wound    HPI/SUBJECTIVE:    The patient is:   [x] Verbal and participatory  [] Non-participatory due to:       Clinical Pain Assessment (nonverbal scale for severity on nonverbal patients):   Clinical Pain Assessment  Severity: 3  Location: bilateral ischial wounds  Character: aching, sharp  Duration: chronic  Effect: unable to tell me  Factors: wound care makes it worse  Frequency: continuous          Duration: for how long has pt been experiencing pain (e.g., 2 days, 1 month, years)  Frequency: how often pain is an issue (e.g., several times per day, once every few days, constant)     FUNCTIONAL ASSESSMENT:     Palliative Performance Scale (PPS):  PPS: 30       PSYCHOSOCIAL/SPIRITUAL SCREENING:     Palliative IDT has assessed this patient for cultural preferences / practices and a referral made as appropriate to needs (Cultural Services, Patient Advocacy, Ethics, etc.)    Any spiritual / Yazdanism concerns:  [] Yes /  [x] No   If \"Yes\" to discuss with pastoral care during IDT     Does caregiver feel burdened by caring for their loved one:   [] Yes /  [x] No /  [] No Caregiver Present    If \"Yes\" to discuss with social work during IDT    Anticipatory grief assessment:   [x] Normal  / [] Maladaptive     If \"Maladaptive\" to discuss with social work during IDT    ESAS Anxiety: Anxiety: 0    ESAS Depression: Depression: 0        REVIEW OF SYSTEMS:     Positive and pertinent negative findings in ROS are noted above in HPI. The following systems were [x] reviewed / [] unable to be reviewed as noted in HPI  Other findings are noted below. Systems: constitutional, ears/nose/mouth/throat, respiratory, gastrointestinal, genitourinary, musculoskeletal, integumentary, neurologic, psychiatric, endocrine. Positive findings noted below. Modified ESAS Completed by: provider   Fatigue: 6 Drowsiness: 2   Depression: 0 Pain: 3   Anxiety: 0 Nausea: 0   Anorexia: 6 Dyspnea: 0           Stool Occurrence(s): (P) 1        PHYSICAL EXAM:     From RN flowsheet:  Wt Readings from Last 3 Encounters:   02/15/22 154 lb 5.2 oz (70 kg)   08/06/21 152 lb 1.9 oz (69 kg)   07/20/16 152 lb (68.9 kg)     Blood pressure 138/69, pulse 86, temperature 98.1 °F (36.7 °C), resp. rate 22, height 6' 1\" (1.854 m), weight 154 lb 5.2 oz (70 kg), SpO2 98 %. Pain Scale 1: Numeric (0 - 10)  Pain Intensity 1: 0  Pain Onset 1: wound  Pain Location 1: Buttocks,Ankle  Pain Orientation 1: Mid,Right  Pain Description 1: Aching  Pain Intervention(s) 1: Medication (see MAR)  Last bowel movement, if known:     Constitutional: Over all frail and chronically ill in appearance. Mildly lethargic, however engaged and appropriate in conversation and pleasant. Respiratory: breathing not labored, symmetric  Musculoskeletal: quadriplegic and wasted muscle appearance reflective of this.    Skin: warm, dry  Neurologic: following commands, functional quadriplegic with hx of MS  Psychiatric: full affect, no hallucinations  Other: n/a       HISTORY:     Active Problems:    Sepsis ([facility] [address] (2/9/2022)      Sacral pressure ulcer (2/9/2022)      Frailty ()      Acute pain ()      Physical debility ()      Anorexia ()      Palliative care by specialist ()      Fatigue, unspecified type ()      Past Medical History:   Diagnosis Date    MS (multiple sclerosis) ([facility] [address]     Stroke ([facility] [address]       Past Surgical History:   Procedure Laterality Date    HX CORONARY STENT PLACEMENT      x7       No family history on file. History reviewed, no pertinent family history.   Social History     Tobacco Use    Smoking status: Never Smoker    Smokeless tobacco: Not on file   Substance Use Topics    Alcohol use: No     Allergies   Allergen Reactions    Beeswax Anaphylaxis    Bee Pollen Unknown (comments)    Simvastatin Not Reported This Time    Tolterodine Not Reported This Time    Venom-Wasp Not Reported This Time      Current Facility-Administered Medications   Medication Dose Route Frequency    oxyCODONE IR (ROXICODONE) tablet 5 mg  5 mg Oral Q4H    acetaminophen (TYLENOL) tablet 1,000 mg  1,000 mg Oral TID    ampicillin-sulbactam (UNASYN) 3 g in 0.9% sodium chloride (MBP/ADV) 100 mL MBP  3 g IntraVENous Q6H    amiodarone (CORDARONE) tablet 400 mg  400 mg Oral BID    morphine injection 2 mg  2 mg IntraVENous Q4H PRN    midodrine (PROAMATINE) tablet 5 mg  5 mg Oral TID WITH MEALS    [Held by provider] metoprolol tartrate (LOPRESSOR) tablet 12.5 mg  12.5 mg Oral Q12H    balsam peru-castor oiL (VENELEX) ointment   Topical BID    ferrous sulfate tablet 325 mg  325 mg Oral EVERY OTHER DAY    lidocaine/EPINEPHrine/tetracaine (L.E.T) topical gel  3 mL Topical Q12H    digoxin (LANOXIN) tablet 0.125 mg  0.125 mg Oral DAILY    dextrose 10% infusion 125-250 mL  125-250 mL IntraVENous PRN    sodium chloride (NS) flush 5-10 mL  5-10 mL IntraVENous PRN    ondansetron (ZOFRAN) injection 4 mg  4 mg IntraVENous Q6H PRN    aspirin chewable tablet 81 mg  81 mg Oral DAILY    albuterol-ipratropium (DUO-NEB) 2.5 MG-0.5 MG/3 ML  3 mL Nebulization Q4H PRN    guaiFENesin (ROBITUSSIN) 100 mg/5 mL oral liquid 200 mg  200 mg Oral Q6H PRN    dronabinoL (MARINOL) capsule 2.5 mg  2.5 mg Oral ACB&D    mirtazapine (REMERON) tablet 30 mg  30 mg Oral QHS    insulin lispro (HUMALOG) injection   SubCUTAneous AC&HS    glucose chewable tablet 16 g  4 Tablet Oral PRN    glucagon (GLUCAGEN) injection 1 mg  1 mg IntraMUSCular PRN    pantoprazole (PROTONIX) tablet 40 mg  40 mg Oral ACB    senna-docusate (PERICOLACE) 8.6-50 mg per tablet 2 Tablet  2 Tablet Oral QHS    sertraline (ZOLOFT) tablet 25 mg  25 mg Oral DAILY    glatiramer (COPAXONE) injection 20 mg  20 mg SubCUTAneous Q MON, WED & FRI    0.9% sodium chloride infusion  50 mL/hr IntraVENous CONTINUOUS    L.acidophilus-paracasei-S.thermophil-bifidobacter (RISAQUAD) 8 billion cell capsule  1 Capsule Oral DAILY    enoxaparin (LOVENOX) injection 40 mg  40 mg SubCUTAneous DAILY          LAB AND IMAGING FINDINGS:     Lab Results   Component Value Date/Time    WBC 10.6 02/16/2022 04:47 AM    HGB 8.9 (L) 02/16/2022 04:47 AM    PLATELET 297 (H) 58/29/4773 04:47 AM     Lab Results   Component Value Date/Time    Sodium 144 02/16/2022 04:47 AM    Potassium 3.5 02/16/2022 04:47 AM    Chloride 106 02/16/2022 04:47 AM    CO2 34 (H) 02/16/2022 04:47 AM    BUN 3 (L) 02/16/2022 04:47 AM    Creatinine 0.26 (L) 02/16/2022 04:47 AM    Calcium 8.2 (L) 02/16/2022 04:47 AM    Magnesium 2.2 02/16/2022 04:47 AM    Phosphorus 2.7 02/16/2022 04:47 AM      Lab Results   Component Value Date/Time    Alk.  phosphatase 70 02/10/2022 04:00 AM    Protein, total 6.3 (L) 02/10/2022 04:00 AM    Albumin 1.9 (L) 02/10/2022 04:00 AM    Globulin 4.4 (H) 02/10/2022 04:00 AM     Lab Results   Component Value Date/Time    INR 1.3 (H) 02/10/2022 04:00 AM    Prothrombin time 13.2 (H) 02/10/2022 04:00 AM      No results found for: IRON, FE, TIBC, IBCT, PSAT, FERR   No results found for: PH, PCO2, PO2  No components found for: GLPOC   No results found for: CPK, CKMB             Total time: 35 minutes  Counseling / coordination time, spent as noted above: 20 minutes  > 50% counseling / coordination?: yes    Prolonged service was provided for  []30 min   []75 min in face to face time in the presence of the patient, spent as noted above. Time Start:   Time End:   Note: this can only be billed with 38877 (initial) or 16142 (follow up). If multiple start / stop times, list each separately.

## 2022-02-16 NOTE — PROGRESS NOTES
Comprehensive Nutrition Assessment    Type and Reason for Visit: Reassess    Nutrition Recommendations/Plan:   Continue regular diet  Continue Nepro shakes BID  Please document % meals and supplements consumed in flowsheet I/O's under intake     Feeding assistance and encouragement  If aligned with goals of care, would consider supplemental nutrition support as pt is consistently not eating much (<25% meals and supplements) despite appetite stimulants    Nutrition Assessment:      Chart reviewed. Plans for BRITANY on Friday. Pt declined diverting colostomy and is continuing with aggressive care. RD visited bedside this morning, pt unable to tell me if/what/how much breakfast he ate this morning. Also unsure if he as been drinking his supplements. Poor PO intake documented. He would not tell me anything he would want for lunch either. Clearly not interested in eating. Pt has high estimated protein needs d/t his wounds. Would consider supplemental nutrition support if aligned with pt's goals of care. He continues to be at chronic risk for aspiration. Encourage intake of meals and supplements. Patient Vitals for the past 168 hrs:   % Diet Eaten   02/15/22 1808 1 - 25%   02/15/22 1209 1 - 25%   02/15/22 0746 1 - 25%   02/14/22 1811 1 - 25%   02/12/22 1513 0%   02/12/22 0748 0%   02/10/22 1230 26 - 50%   02/10/22 0930 26 - 50%   No data found.     Malnutrition Assessment:  Malnutrition Status:  Severe malnutrition    Context:  Chronic illness     Findings of the 6 clinical characteristics of malnutrition:   Energy Intake:  Mild decrease in energy intake (specify)  Weight Loss:  Unable to assess     Body Fat Loss:  7 - Severe body fat loss, Orbital,Fat overlying ribs,Buccal region   Muscle Mass Loss:  7 - Severe muscle mass loss, Clavicles (pectoralis &deltoids),Hand (interosseous),Scapula (trapezius),Temples (temporalis)  Fluid Accumulation:  No significant fluid accumulation,     Strength:  Not performed Estimated Daily Nutrient Needs:  Energy (kcal): 2167 kcals (BMR 1513 x 1.3AF + 250); Weight Used for Energy Requirements: Current  Protein (g): 91-105g (1.3-1.5g/kg); Weight Used for Protein Requirements: Current  Fluid (ml/day): 2150mL; Method Used for Fluid Requirements: 1 ml/kcal      Nutrition Related Findings:  Labs: reviewed. Meds: unasyn, marinol, ferrous ulfate, risaquad, remeron, roxicodone, pericolace, NS. BM 2/10.       Wounds:    Pressure injury,Stage IV,Multiple       Current Nutrition Therapies:  ADULT DIET Regular  ADULT ORAL NUTRITION SUPPLEMENT Breakfast, Dinner; Renal Supplement  DIET NPO    Anthropometric Measures:  · Height:  6' 1\" (185.4 cm)  · Current Body Wt:  69.9 kg (154 lb 1.6 oz)   · Ideal Body Wt:  184 lbs:  83.8 %   · BMI Category:  Underweight (BMI less than 22) age over 72       Nutrition Diagnosis:   · Severe malnutrition,In context of acute illness or injury related to inadequate protein-energy intake as evidenced by severe loss of subcutaneous fat,severe muscle loss,poor intake prior to admission,intake 0-25%  Dx continues     · Increased nutrient needs related to  (wound healing) as evidenced by  (multiple chronic stage IV wounds)  Dx continues    Nutrition Interventions:   Food and/or Nutrient Delivery: Continue current diet,Continue oral nutrition supplement  Nutrition Education and Counseling: No recommendations at this time  Coordination of Nutrition Care: Continue to monitor while inpatient    Goals:  PO intake >50% meals and supplements vs nutrition support next 3-5 days       Nutrition Monitoring and Evaluation:   Behavioral-Environmental Outcomes: None identified  Food/Nutrient Intake Outcomes: Food and nutrient intake,Supplement intake  Physical Signs/Symptoms Outcomes: Biochemical data,GI status,Weight,Skin,Nutrition focused physical findings    Discharge Planning:    Continue current diet,Continue oral nutrition supplement     Electronically signed by Florencio Allen TSERING on 2/16/2022 at 11:10 AM    Contact: [phone]

## 2022-02-17 ENCOUNTER — TELEPHONE (OUTPATIENT)
Dept: INFECTIOUS DISEASES | Age: 72
End: 2022-02-17

## 2022-02-17 LAB
ANION GAP SERPL CALC-SCNC: 0 MMOL/L (ref 5–15)
BACTERIA SPEC CULT: NORMAL
BUN SERPL-MCNC: 5 MG/DL (ref 6–20)
BUN/CREAT SERPL: 19 (ref 12–20)
CALCIUM SERPL-MCNC: 7.8 MG/DL (ref 8.5–10.1)
CHLORIDE SERPL-SCNC: 108 MMOL/L (ref 97–108)
CO2 SERPL-SCNC: 38 MMOL/L (ref 21–32)
CREAT SERPL-MCNC: 0.27 MG/DL (ref 0.7–1.3)
GLUCOSE BLD STRIP.AUTO-MCNC: 100 MG/DL (ref 65–117)
GLUCOSE BLD STRIP.AUTO-MCNC: 103 MG/DL (ref 65–117)
GLUCOSE BLD STRIP.AUTO-MCNC: 109 MG/DL (ref 65–117)
GLUCOSE BLD STRIP.AUTO-MCNC: 164 MG/DL (ref 65–117)
GLUCOSE BLD STRIP.AUTO-MCNC: 99 MG/DL (ref 65–117)
GLUCOSE SERPL-MCNC: 104 MG/DL (ref 65–100)
POTASSIUM SERPL-SCNC: 3.7 MMOL/L (ref 3.5–5.1)
SERVICE CMNT-IMP: ABNORMAL
SERVICE CMNT-IMP: NORMAL
SODIUM SERPL-SCNC: 146 MMOL/L (ref 136–145)

## 2022-02-17 PROCEDURE — 02HV33Z INSERTION OF INFUSION DEVICE INTO SUPERIOR VENA CAVA, PERCUTANEOUS APPROACH: ICD-10-PCS | Performed by: INTERNAL MEDICINE

## 2022-02-17 PROCEDURE — 36415 COLL VENOUS BLD VENIPUNCTURE: CPT

## 2022-02-17 PROCEDURE — 74011636637 HC RX REV CODE- 636/637: Performed by: HOSPITALIST

## 2022-02-17 PROCEDURE — 2709999900 HC NON-CHARGEABLE SUPPLY

## 2022-02-17 PROCEDURE — 77030018786 HC NDL GD F/USND BARD -B

## 2022-02-17 PROCEDURE — 74011250636 HC RX REV CODE- 250/636: Performed by: INTERNAL MEDICINE

## 2022-02-17 PROCEDURE — 99231 SBSQ HOSP IP/OBS SF/LOW 25: CPT | Performed by: SURGERY

## 2022-02-17 PROCEDURE — 99233 SBSQ HOSP IP/OBS HIGH 50: CPT | Performed by: INTERNAL MEDICINE

## 2022-02-17 PROCEDURE — 82962 GLUCOSE BLOOD TEST: CPT

## 2022-02-17 PROCEDURE — 74011000258 HC RX REV CODE- 258: Performed by: INTERNAL MEDICINE

## 2022-02-17 PROCEDURE — 74011250636 HC RX REV CODE- 250/636: Performed by: HOSPITALIST

## 2022-02-17 PROCEDURE — 80048 BASIC METABOLIC PNL TOTAL CA: CPT

## 2022-02-17 PROCEDURE — 74011250637 HC RX REV CODE- 250/637: Performed by: INTERNAL MEDICINE

## 2022-02-17 PROCEDURE — 74011250637 HC RX REV CODE- 250/637: Performed by: HOSPITALIST

## 2022-02-17 PROCEDURE — 74011250637 HC RX REV CODE- 250/637: Performed by: NURSE PRACTITIONER

## 2022-02-17 PROCEDURE — 65660000000 HC RM CCU STEPDOWN

## 2022-02-17 PROCEDURE — 76937 US GUIDE VASCULAR ACCESS: CPT

## 2022-02-17 PROCEDURE — 77010033678 HC OXYGEN DAILY

## 2022-02-17 PROCEDURE — 36573 INSJ PICC RS&I 5 YR+: CPT | Performed by: INTERNAL MEDICINE

## 2022-02-17 PROCEDURE — 74011000250 HC RX REV CODE- 250: Performed by: INTERNAL MEDICINE

## 2022-02-17 PROCEDURE — C1751 CATH, INF, PER/CENT/MIDLINE: HCPCS

## 2022-02-17 RX ORDER — AMIODARONE HYDROCHLORIDE 200 MG/1
400 TABLET ORAL DAILY
Status: DISCONTINUED | OUTPATIENT
Start: 2022-02-18 | End: 2022-02-18

## 2022-02-17 RX ORDER — HEPARIN 100 UNIT/ML
300 SYRINGE INTRAVENOUS AS NEEDED
Status: DISCONTINUED | OUTPATIENT
Start: 2022-02-17 | End: 2022-02-18 | Stop reason: HOSPADM

## 2022-02-17 RX ADMIN — AMIODARONE HYDROCHLORIDE 400 MG: 200 TABLET ORAL at 09:40

## 2022-02-17 RX ADMIN — AMPICILLIN SODIUM AND SULBACTAM SODIUM 3 G: 2; 1 INJECTION, POWDER, FOR SOLUTION INTRAMUSCULAR; INTRAVENOUS at 04:43

## 2022-02-17 RX ADMIN — OXYCODONE 5 MG: 5 TABLET ORAL at 22:00

## 2022-02-17 RX ADMIN — AMPICILLIN SODIUM AND SULBACTAM SODIUM 3 G: 2; 1 INJECTION, POWDER, FOR SOLUTION INTRAMUSCULAR; INTRAVENOUS at 21:59

## 2022-02-17 RX ADMIN — AMPICILLIN SODIUM AND SULBACTAM SODIUM 3 G: 2; 1 INJECTION, POWDER, FOR SOLUTION INTRAMUSCULAR; INTRAVENOUS at 17:38

## 2022-02-17 RX ADMIN — SERTRALINE 25 MG: 50 TABLET, FILM COATED ORAL at 09:47

## 2022-02-17 RX ADMIN — OXYCODONE 5 MG: 5 TABLET ORAL at 04:43

## 2022-02-17 RX ADMIN — DIGOXIN 0.12 MG: 125 TABLET ORAL at 09:39

## 2022-02-17 RX ADMIN — ACETAMINOPHEN 1000 MG: 500 TABLET ORAL at 17:39

## 2022-02-17 RX ADMIN — ACETAMINOPHEN 1000 MG: 500 TABLET ORAL at 09:40

## 2022-02-17 RX ADMIN — Medication 1 CAPSULE: at 09:39

## 2022-02-17 RX ADMIN — ENOXAPARIN SODIUM 40 MG: 100 INJECTION SUBCUTANEOUS at 09:41

## 2022-02-17 RX ADMIN — AMIODARONE HYDROCHLORIDE 400 MG: 200 TABLET ORAL at 17:39

## 2022-02-17 RX ADMIN — MIRTAZAPINE 30 MG: 15 TABLET, FILM COATED ORAL at 22:00

## 2022-02-17 RX ADMIN — AMPICILLIN SODIUM AND SULBACTAM SODIUM 3 G: 2; 1 INJECTION, POWDER, FOR SOLUTION INTRAMUSCULAR; INTRAVENOUS at 09:46

## 2022-02-17 RX ADMIN — FERROUS SULFATE TAB 325 MG (65 MG ELEMENTAL FE) 325 MG: 325 (65 FE) TAB at 18:26

## 2022-02-17 RX ADMIN — CASTOR OIL AND BALSAM, PERU: 788; 87 OINTMENT TOPICAL at 22:03

## 2022-02-17 RX ADMIN — PANTOPRAZOLE SODIUM 40 MG: 40 TABLET, DELAYED RELEASE ORAL at 09:40

## 2022-02-17 RX ADMIN — CASTOR OIL AND BALSAM, PERU: 788; 87 OINTMENT TOPICAL at 09:42

## 2022-02-17 RX ADMIN — DRONABINOL 2.5 MG: 2.5 CAPSULE ORAL at 09:40

## 2022-02-17 RX ADMIN — Medication 2 UNITS: at 17:38

## 2022-02-17 RX ADMIN — DOCUSATE SODIUM 50 MG AND SENNOSIDES 8.6 MG 2 TABLET: 8.6; 5 TABLET, FILM COATED ORAL at 21:59

## 2022-02-17 RX ADMIN — Medication 3 ML: at 09:00

## 2022-02-17 RX ADMIN — DRONABINOL 2.5 MG: 2.5 CAPSULE ORAL at 17:43

## 2022-02-17 RX ADMIN — ACETAMINOPHEN 1000 MG: 500 TABLET ORAL at 21:59

## 2022-02-17 RX ADMIN — ASPIRIN 81 MG CHEWABLE TABLET 81 MG: 81 TABLET CHEWABLE at 09:39

## 2022-02-17 RX ADMIN — OXYCODONE 5 MG: 5 TABLET ORAL at 09:46

## 2022-02-17 RX ADMIN — OXYCODONE 5 MG: 5 TABLET ORAL at 17:39

## 2022-02-17 NOTE — PROGRESS NOTES
PICC Education: Explained reason and rationale for PICC placement along with providing education in order to make an informed consent including nature, risks, benefits, potential complications, care and maintenance of PICC line. The opportunity for questions or concerns was given. A 'Patient PICC Handbook was also provided. Patient  gave written consent for PICC procedure to be done at the bedside. Patient  verbalizes understanding at this time. Patient was fully oriented, however had some physical difficulty with his signature. Procedure:  Time out completed. Pre procedure assessment done. Maximum sterile barrier precautions observed throughout procedure. Lidocaine 1% 3.0 ml sc given prior to cannulation  Cannulated Basilic vein using ultrasound guidance and modified seldinger technique. Inserted SINGLE lumen 4 fr PICC in  RIGHT arm using, [employer] Tip Location System and 3CG   Patient has sinus rhythm. Tip Positioning System indicating tall P wave and no negative deflection before P wave which would indicate the PICC tip is properly placed in the distal SVC or the CAJ. PICC tip was confirmed by 2 PICC nurses and 3CG printout was placed on patients chart. Blood return verified and flushed with 20ml NS in each port. Sterile dressing applied with Biopatch, Stat loc, occlusive dressing per protocol. Curios caps applied to each port. Reason for access (Long term antibiotics to 3/25/22). Complications related to insertion (None). Patient tolerated procedure well with minimal blood loss. PICC procedure performed by: Minda Aguirre, RN, BSN, [facility]/ Vascular Access Nurse  Assisted by: Santos Johnson RN, BSN, CRNI / Vascular Access Nurse    RIGHT  arm circumference: 42 cm.    Catheter internal length:  42 cm  Catheter external length: 0 cm  TOTAL Length:42 cm  PICC catheter occupies 5% of vein    Brand of catheter:  BARD  Lot #[id number]   REF# [id number]    EXP03/31/2023    Primary nurse, notified PICC line may be used and to hang new infusion tubing prior to use.     Stephanie Phillips RN, BSN, [facility]  Vascular Access Team

## 2022-02-17 NOTE — PROGRESS NOTES
Infectious Disease progress      IMPRESSION:     -Sepsis    -Bacteremia with strep agalactiae sero Gp B Strep 1/2 ,P.mirabilis 2nd of 2  BC- 2/9 -Strep agalactiae Gp B beta hemolytic  1/2, P.mirabilis 2nd of 2 bottles. Negative cultures-2/11, 2/12. ECHO- anterior mitral valve leaflet with ruptured chordae vs? Vegetation. No BRITANY per medical POA    -Infected sacral & ischial ulcers   Wound cultures-2/9-light P.mirabilis, light strep Gp B beta hemolytic, light alpha strep, moderate Bacteroides uniformis, beta lactamase negative.    -Chronic OM of sacrococcygeal junction,acute OM ischial tuberosities. CT scan-Deep soft tissue ulcerations extending to the bilateral ischial tuberosities  with associated acute osteomyelitis. Chronic osteomyelitis involving the sacrococcygeal junction with overlying  soft tissue ulceration and abscess. Bilateral femoral head avascular necrosis with subtle subchondral collapse. S/p local debridement at bedside by Gen Surgery on 2/14. -UTI with P.mirabilis   indwelling mauro, neurogenic bladder  UC + for >100,000 P.mirabilis. -Severe MS   bedbound, functional quadriplegia     -Dysphagia    -Chronic cough    -Severe protein calory malnutrition    -Elevated A1c 6.6 ? Diabetes mellitus    -ESR 73, CRP11.2         PLAN:        -Continue Unasyn iv x 6 weeks end date 3/25  -Pharmacy on consult for dosing, monitor Cr  -Wound Care per Wound care, Surgery recommendations.  -Blood sugar control.  -Aspiration precautions. Antibiotic orders for discharge  Unasyn 3 g IV every 6 hours end date 3/25, pull PICC at end of therapy. Weekly CBC, CMP & ESR/CRP every other week fax reports to [phone], called with critical labs at [phone]  Encourage adequate fluids, daily probiotic/yogurt  ID virtual follow-up 3/24 at 11 AM.             Patient seen after PICC placement  Resting/arousable.     Franco Dee is a 80year old male with history DM, MS, Stroke who presented on 2/9 with jazmyn wounds on sacrum and ischium. He has bilaterally with  fever. Patient is a resident of [facility], found to have fever and tachycardia on morning of admission with worsening of his chronic wounds to sacrum, heels. Patient sating 90% on room air, placed on o2. Per ED note he was  oriented to self, stable neuro exam at time of admission. Patient tachycardic to 126, /70, fecbrile to 101.6 en route. Unknown vaccination status. Patient reports pain to his bottom which has been worsening over the past week approximately. When speaking to his niece Bridget who is NP with orthopedics, I have learned that the best been dealing with chronic sacral wounds, sacral osteomyelitis. Usually receives treatment at the [facility]. Patient has developed newly skin wounds bilaterally which have been progressing over the past month. Patient has diagnosis of chronic  osteomyelitis of sacrum, recurrent UTI, pneumonia. Right ankle wound has been present for 1 year. CT pelvis-2/9   As follows  FINDINGS:  Bowel: Partially imaged, within normal limits.     Reproductive organs: Within normal limits.     Soft tissues: No pelvic mass or lymphadenopathy.     Fluid: No ascites or drainable fluid collection     Bones: Chronic appearing erosive changes and fragmentation at the sacrococcygeal  junction. Approximately 6.2 cm x 0.8 cm x 10.7 cm amorphous thick-walled mottled  fluid and gas collection in the presacral soft tissues with overlying skin  ulceration, likely an abscess. Deep soft tissue ulceration extending to the  bilateral ischial tuberosities. No aggressive patchy osteolysis at ischial  tuberosities concerning for acute osteoarthritis. Degenerative changes in the  spine. Bilateral femoral head avascular necrosis with subtle subchondral  collapse     Miscellaneous: Atherosclerosis. Bladder decompressed with Guthrie in place     IMPRESSION  1.   Deep soft tissue ulcerations extending to the bilateral ischial tuberosities  with associated acute osteomyelitis. 2.  Chronic osteomyelitis involving the sacrococcygeal junction with overlying  soft tissue ulceration and abscess. 3.  Bilateral femoral head avascular necrosis with subtle subchondral collapse    Patient Active Problem List   Diagnosis Code    Sepsis ([facility] A41.9    Sacral pressure ulcer L89.159    Frailty R54    Acute pain R52    Physical debility R53.81    Anorexia R63.0    Palliative care by specialist Z51.5    Fatigue, unspecified type R53.83     Past Medical History:   Diagnosis Date    MS (multiple sclerosis) ([facility]     Stroke ([facility]       No family history on file. Social History     Tobacco Use    Smoking status: Never Smoker    Smokeless tobacco: Not on file   Substance Use Topics    Alcohol use: No     Past Surgical History:   Procedure Laterality Date    HX CORONARY STENT PLACEMENT      x7       Prior to Admission medications    Medication Sig Start Date End Date Taking? Authorizing Provider   acetaminophen (TYLENOL) 500 mg tablet Take 1,000 mg by mouth every eight (8) hours as needed (moderate pain). Yes Provider, Historical   ascorbic acid, vitamin C, (VITAMIN C) 500 mg tablet Take 500 mg by mouth daily. Yes Provider, Historical   aspirin delayed-release 81 mg tablet Take 81 mg by mouth daily. Yes Provider, Historical   bisacodyL (DULCOLAX) 10 mg supp Insert 10 mg into rectum daily as needed for Constipation. Yes Provider, Historical   cholecalciferol (VITAMIN D3) (2,000 UNITS /50 MCG) cap capsule Take 2,000 Units by mouth daily. Yes Provider, Historical   cyanocobalamin (VITAMIN B12) 100 mcg tablet Take 200 mcg by mouth daily. Yes Provider, Historical   docusate sodium (COLACE) 100 mg capsule Take 100 mg by mouth nightly. Yes Provider, Historical   ferrous sulfate 325 mg (65 mg iron) tablet Take 325 mg by mouth every other day. Yes Provider, Historical   folic acid (FOLVITE) 1 mg tablet Take 2 mg by mouth daily.    Yes Provider, Historical   glatiramer (COPAXONE) 40 mg/mL syrg 40 mg by SubCUTAneous route every Monday, Wednesday, Friday. Yes Provider, Historical   polyethylene glycol (Miralax) 17 gram packet Take 17 g by mouth daily. Yes Provider, Historical   guaiFENesin (ORGANIDIN) 200 mg tablet Take 400 mg by mouth every eight (8) hours as needed for Congestion. Yes Provider, Historical   albuterol-ipratropium (DUO-NEB) 2.5 mg-0.5 mg/3 ml nebu 3 mL by Nebulization route every four (4) hours as needed for Wheezing or Shortness of Breath. Yes Provider, Historical   lidocaine-vit e-aloe vera-wayne 2 % topical gel Apply 1 Each to affected area Every Thursday. Apply to ischiums and right ankle prior to wound care visit   Yes Provider, Historical   lidocaine-vit e-aloe vera-wayne 2 % topical gel Apply 1 Each to affected area Every Thursday. Apply to sacrum prior to wound care   Yes Provider, Historical   dronabinoL (MARINOL) 2.5 mg capsule Take 2.5 mg by mouth two (2) times a day. Yes Provider, Historical   mirtazapine (REMERON) 30 mg tablet Take 30 mg by mouth nightly. Yes Provider, Historical   therapeutic multivitamin (THERAGRAN) tablet Take 1 Tablet by mouth daily. Yes Provider, Historical   insulin aspart U-100 (NOVOLOG) 100 unit/mL injection by SubCUTAneous route Before breakfast, lunch, and dinner. 150-200 = 2 units  201-250 = 4 units  251-300 = 5 units  301-350 = 7 units  351-400 = 9 units  401+ = 10 units   Yes Provider, Historical   omeprazole (PRILOSEC) 40 mg capsule Take 40 mg by mouth daily. Yes Provider, Historical   oxyCODONE IR (ROXICODONE) 5 mg immediate release tablet Take 5 mg by mouth every four (4) hours as needed for Pain. Yes Provider, Historical   senna (SENOKOT) 8.6 mg tablet Take 2 Tablets by mouth nightly. Yes Provider, Historical   sertraline (ZOLOFT) 25 mg tablet Take 25 mg by mouth daily. Yes Provider, Historical   zinc sulfate (Zinc-220) 50 mg zinc (220 mg) capsule Take 1 Capsule by mouth every other day.    Yes Provider, Historical     Allergies   Allergen Reactions    Beeswax Anaphylaxis    Bee Pollen Unknown (comments)    Simvastatin Not Reported This Time    Tolterodine Not Reported This Time    Venom-Wasp Not Reported This Time        Review of Systems:  A comprehensive review of systems was negative except for that written in the History of Present Illness. 14 point review of systems obtained . All other systems negative    Objective:   Blood pressure (!) 115/59, pulse 75, temperature 98.7 °F (37.1 °C), resp. rate 20, height 6' 1\" (1.854 m), weight 160 lb 11.5 oz (72.9 kg), SpO2 95 %.   Temp (24hrs), Av.3 °F (36.8 °C), Min:97.7 °F (36.5 °C), Max:98.7 °F (37.1 °C)    Current Facility-Administered Medications   Medication Dose Route Frequency    heparin (porcine) pf 300 Units  300 Units InterCATHeter PRN    oxyCODONE IR (ROXICODONE) tablet 5 mg  5 mg Oral Q6H    acetaminophen (TYLENOL) tablet 1,000 mg  1,000 mg Oral TID    ampicillin-sulbactam (UNASYN) 3 g in 0.9% sodium chloride (MBP/ADV) 100 mL MBP  3 g IntraVENous Q6H    amiodarone (CORDARONE) tablet 400 mg  400 mg Oral BID    morphine injection 2 mg  2 mg IntraVENous Q4H PRN    [Held by provider] midodrine (PROAMATINE) tablet 5 mg  5 mg Oral TID WITH MEALS    [Held by provider] metoprolol tartrate (LOPRESSOR) tablet 12.5 mg  12.5 mg Oral Q12H    balsam peru-castor oiL (VENELEX) ointment   Topical BID    ferrous sulfate tablet 325 mg  325 mg Oral EVERY OTHER DAY    lidocaine/EPINEPHrine/tetracaine (L.E.T) topical gel  3 mL Topical Q12H    digoxin (LANOXIN) tablet 0.125 mg  0.125 mg Oral DAILY    dextrose 10% infusion 125-250 mL  125-250 mL IntraVENous PRN    sodium chloride (NS) flush 5-10 mL  5-10 mL IntraVENous PRN    ondansetron (ZOFRAN) injection 4 mg  4 mg IntraVENous Q6H PRN    aspirin chewable tablet 81 mg  81 mg Oral DAILY    albuterol-ipratropium (DUO-NEB) 2.5 MG-0.5 MG/3 ML  3 mL Nebulization Q4H PRN    guaiFENesin (ROBITUSSIN) 100 mg/5 mL oral liquid 200 mg  200 mg Oral Q6H PRN    dronabinoL (MARINOL) capsule 2.5 mg  2.5 mg Oral ACB&D    mirtazapine (REMERON) tablet 30 mg  30 mg Oral QHS    insulin lispro (HUMALOG) injection   SubCUTAneous AC&HS    glucose chewable tablet 16 g  4 Tablet Oral PRN    glucagon (GLUCAGEN) injection 1 mg  1 mg IntraMUSCular PRN    pantoprazole (PROTONIX) tablet 40 mg  40 mg Oral ACB    senna-docusate (PERICOLACE) 8.6-50 mg per tablet 2 Tablet  2 Tablet Oral QHS    sertraline (ZOLOFT) tablet 25 mg  25 mg Oral DAILY    glatiramer (COPAXONE) injection 20 mg  20 mg SubCUTAneous Q MON, WED & FRI    L.acidophilus-paracasei-S.thermophil-bifidobacter (RISAQUAD) 8 billion cell capsule  1 Capsule Oral DAILY    enoxaparin (LOVENOX) injection 40 mg  40 mg SubCUTAneous DAILY        Exam:    General:   Resting, arousable cooperative,contracted   Eyes:  Sclera anicteric. Pupils equally round and reactive to light. Mouth/Throat: Mucous membranes normal, oral pharynx clear   Neck: Supple   Lungs:   Clear to auscultation bilaterally, good effort   CV:  Regular rate and rhythm,no murmur, click, rub or gallop   Abdomen:   Soft, non-tender. bowel sounds normal. non-distended   Extremities: No  edema   Skin: Skin color, texture, turgor normal. no acute rash or lesions   Lymph nodes: Cervical and supraclavicular normal   Musculoskeletal: No swelling or deformity   Lines/Devices:  Intact, no erythema, drainage or tenderness   Psych:  Cannot assess.        Data Reviewed:   CBC:   Recent Labs     02/16/22 0447 02/15/22  0200   WBC 10.6 9.2   RBC 3.90* 3.72*   HGB 8.9* 8.7*   HCT 30.9* 29.8*   * 354   GRANS 80* 75   LYMPH 10* 13   EOS 2 2     CMP:   Recent Labs     02/17/22  0117 02/16/22  0447 02/15/22  0200   * 98 100   * 144 140   K 3.7 3.5 3.3*    106 108   CO2 38* 34* 30   BUN 5* 3* 3*   CREA 0.27* 0.26* 0.24*   CA 7.8* 8.2* 7.5*   AGAP 0* 4* 2*   BUCR 19 12 13       Lab Results   Component Value Date/Time    Culture result: NO GROWTH 5 DAYS 02/12/2022 03:42 AM    Culture result: NO GROWTH 5 DAYS 02/11/2022 11:04 AM    Culture result: LIGHT PROTEUS MIRABILIS (A) 02/10/2022 04:00 AM    Culture result: (A) 02/10/2022 04:00 AM     LIGHT STREPTOCOCCI, BETA HEMOLYTIC GROUP B Penicillin and ampicillin are drugs of choice for treatment of beta-hemolytic streptococcal infections. Susceptibility testing of penicillins and beta-lactams approved by the FDA for treatment of beta-hemolytic streptococcal infections need not be performed routinely, because nonsusceptible isolates are extremely rare. CLSI 2012    Culture result: LIGHT ALPHA STREPTOCOCCUS (A) 02/10/2022 04:00 AM    Culture result: (A) 02/10/2022 04:00 AM     MODERATE BACTEROIDES UNIFORMIS BETA LACTAMASE NEGATIVE          XR Results (most recent)reviewed:  Results from [place] encounter on 02/09/22    XR CHEST PORT    Narrative  EXAM:  XR CHEST PORT    INDICATION: Eval for infiltrate    COMPARISON: Chest radiographs 7/20/2016    TECHNIQUE: Semiupright portable chest AP view    FINDINGS:    Cardiac silhouette within normal limits. Aorta is tortuous. Several irregular  linear opacities in the right mid and upper lung, again may reflect fibrosis. No  focal consolidation. No definite pleural effusion or pneumothorax. Impression  No consolidative pneumonia. Redemonstrated suspected right lung  fibrotic changes. ICD-10-CM ICD-9-CM    1. Cellulitis of buttock  L03.317 682.5    2. Acute osteomyelitis of pelvic region, unspecified laterality ([facility]  M86.159 730.05    3. Catheter cystitis, initial encounter ([facility]  T83.518A 996.64     N30.90 595.89    4. Chronic cough  R05.3 786.2    5. Esophageal dysphagia  R13.19 787.29    6. Functional quadriplegia (HCC)  R53.2 780.72    7. Gram-negative bacteremia  R78.81 790.7      041.85    8. Gram-negative infection  A49.9 041.85    9. Multiple sclerosis ([facility]  G35 340    10.  Neurogenic bladder  N31.9 596.54    11. Sacral osteomyelitis ([facility])  M46.28 730.28    12. Streptococcal bacteremia  R78.81 790.7     B95.5 041.00    13. Pressure injury of sacral region, stage 4 ([facility])  L89.154 707.03      707.24    14. Bacteremia due to Gram-negative bacteria  R78.81 790.7      041.85    15. Chronic osteomyelitis ([facility]  M86.60 730.10    16. Severe protein-calorie malnutrition ([facility]  E43 262    17. Urinary tract infection due to Proteus  N39.0 599.0     B96.4 041.6    18. Mitral valve vegetation  I33.0 421.0            Antibiotic History  Ceftriaxone, Flagyl  S/p vancomycin, cefepime, Flagyl   I have discussed the diagnosis with the patient and niece & the intended plan as seen in the above orders. I have discussed medication side effects and warnings with the patient & niece. Reviewed test results at length with patient & niece.     Signed By: Kathrine Fontaine MD FACP

## 2022-02-17 NOTE — PROGRESS NOTES
0700: End of Shift Note    Bedside shift change report given to ORAL Morley (oncoming nurse) by Corin Guillen (offgoing nurse). Report included the following information SBAR, Kardex, Intake/Output, MAR, Recent Results and Cardiac Rhythm NSR    Shift worked:  Night     Shift summary and any significant changes:     No significant changes     Concerns for physician to address:       Zone phone for oncoming shift:          Activity:  Activity Level: Bed Rest  Number times ambulated in hallways past shift: 0  Number of times OOB to chair past shift: 0    Cardiac:   Cardiac Monitoring: Yes      Cardiac Rhythm: Sinus Rhythm    Access:   Current line(s): PIV     Genitourinary:   Urinary status: mauro    Respiratory:   O2 Device: Nasal cannula  Chronic home O2 use?: NO  Incentive spirometer at bedside: YES     GI:  Last Bowel Movement Date: 02/10/22  Current diet:  ADULT DIET Regular  ADULT ORAL NUTRITION SUPPLEMENT Breakfast, Dinner; Renal Supplement  DIET NPO  Passing flatus: YES  Tolerating current diet: YES       Pain Management:   Patient states pain is manageable on current regimen: YES    Skin:  Lewis Score: 10  Interventions: increase time out of bed    Patient Safety:  Fall Score:  Total Score: 3  Interventions: bed/chair alarm, gripper socks and pt to call before getting OOB  High Fall Risk: Yes    Length of Stay:  Expected LOS: 4d 19h  Actual LOS: 8      Valentina Abigail Subramanian

## 2022-02-17 NOTE — PROGRESS NOTES
Hospitalist Progress Note    NAME: Regan Collins   :  1950   MRN:  [id number]       Assessment / Plan:  Sepsis (tachycardia and fever) due to infected sacral and left ischium pressure ulcers with history of chronic osteomyelitis  CT scan showed 1. Deep soft tissue ulcerations extending to the bilateral ischial tuberosities with associated acute osteomyelitis. 2.  Chronic osteomyelitis involving the sacrococcygeal junction with overlying  soft tissue ulceration and abscess. 3.  Bilateral femoral head avascular necrosis with subtle subchondral collapse    -Bacteremia with strep agalactiae sero Gp B Strep 1/2 ,P.mrabilis 2nd of 2  -BC-  -Strep agalactiae Gp B beta hemolytic  12, P.mirabilis 2nd of 2 bottles  -Wound cultures--light P.mirabilis, light strep Gp B beta hemolytic, light alpha strep, moderate Bacteroides uniformis, beta lactamase negative. -ECHO showed There is flail anterior mitral valve leaflet with ruptured chordae vs can not rule out vegetation. Moderate eccentric posteriorly directed mitral regurgitation. -S/p local debridement at bedside by Gen Surgery today   -declined diverting colostomy   -Wound care following  -discussed BRITANY with mPOA, cardiology and ID. Patient is not low risk for the procedure and likely will not change abx duration. After weight risk / benefits, we have decided not to pursue test.  Informed Cardiology.   -continue unasyn; Repeat blood cultures until negative. Will reach out to ID for final antibiotic recs re duration. Will need PICC line. CM consult to set up IV abx at [facility]. Atrial Fibrillation (now in NSR)  Amio 400 mg PO BID  C/w  Digoxin  BB DC'd due to BP issues  No AC indicated right now given history of hematuria  BP has been stable, will hold midodrine and monitor next 24 hours.    Cardiology input appreciated     UTI in settings of indwelling mauro  On Unasyn  Urine culture +ve for proteus     MS with severe debility  Functional quadriplegia   bed bound   Continue Copaxone injections  Patient continuing to have a lot of pain  Palliative care following for pain management recommendations  He is currently placed on oxycodone every 4 hours and morphine for breakthrough     Dysphagia  On regular diet at the nursing home per family request  Multiple MBS done in the past but showing some level of dysphagia  Family and patient is not interested in in speech eval or another MBS  Patient and family is aware of risk of aspiration and accepting it fully  HOB at 45 degree of the time  Aspiration precautions  Supervised feeding  Appetite stimulant     Chronic cough, frequent coughing spells  Neurogenic bladder with indwelling Guthrie  CAD: Continue aspirin  Moderate protein calorie malnutrition: Dietary consult  Adult failure to thrive: Continue Remeron and Marinol  Depression: Continue Zoloft and Remeron  Hx of PE: Anticoagulation was discontinued due to recurrent hematuria          Code Status: full code d/w pt and niece at bedside   Surrogate Decision Maker: AUDREY Davis ( ortho NP [address] [facility]) [phone]  and brother Charlie Mason [phone]     DVT Prophylaxis: lovenox   GI Prophylaxis: not indicated     Baseline: Nursing home resident, bedbound, no children       18.5 - 24.9 Normal weight / Body mass index is 21.2 kg/m².     Estimated discharge date: February 21  Barriers: Wound care, bacteremia, infection    Code status: Full  Prophylaxis: Lovenox  Recommended Disposition: SNF/LTC     Subjective:     Chief Complaint / Reason for Physician Visit  Follow up sepsis, bacteremia, OM, UTI, abnormal Echo      Review of Systems:  Symptom Y/N Comments  Symptom Y/N Comments   Fever/Chills    Chest Pain     Poor Appetite    Edema     Cough    Abdominal Pain     Sputum    Joint Pain     SOB/MATUTE    Pruritis/Rash     Nausea/vomit    Tolerating PT/OT     Diarrhea    Tolerating Diet     Constipation    Other       Could NOT obtain due to:      Objective:     VITALS: Last 24hrs VS reviewed since prior progress note. Most recent are:  Patient Vitals for the past 24 hrs:   Temp Pulse Resp BP SpO2   02/17/22 1047 97.7 °F (36.5 °C) 78 17 118/63 96 %   02/17/22 0941  80  131/71    02/17/22 0940  79  131/71    02/17/22 0939  81  131/71    02/17/22 0748 98.4 °F (36.9 °C) 84 24 (!) 146/76 95 %   02/17/22 0253 98.3 °F (36.8 °C) 75 17 130/70 99 %   02/16/22 2242 98.4 °F (36.9 °C) 73 20 117/65 99 %   02/16/22 1909 98.5 °F (36.9 °C) 77 18 (!) 118/56 95 %   02/16/22 1440 98.4 °F (36.9 °C) 73 22 (!) 120/57 97 %   02/16/22 1323  75  102/63        Intake/Output Summary (Last 24 hours) at 2/17/2022 1233  Last data filed at 2/17/2022 0457  Gross per 24 hour   Intake 600 ml   Output 1950 ml   Net -1350 ml        I had a face to face encounter and independently examined this patient on 2/17/2022, as outlined below:  PHYSICAL EXAM:  General: WD, WN. Alert, cooperative, no acute distress , Significantly debilitated, frail, temporal wasting  EENT:  EOMI. Anicteric sclerae. MMM  Resp:  CTA bilaterally, no wheezing or rales. No accessory muscle use  CV:  Regular  rhythm,  No edema  GI:  Soft, Non distended, Non tender. +Bowel sounds  Neurologic:  Alert and oriented X 3, normal speech,   Psych:   Good insight. Not anxious nor agitated  Skin:  No rashes. No jaundice, see wound care for ulcers    Reviewed most current lab test results and cultures  YES  Reviewed most current radiology test results   YES  Review and summation of old records today    NO  Reviewed patient's current orders and MAR    YES  PMH/SH reviewed - no change compared to H&P  ________________________________________________________________________  Care Plan discussed with:    Comments   Patient     Family      RN     Care Manager     Consultant                        Multidiciplinary team rounds were held today with , nursing, pharmacist and clinical coordinator.   Patient's plan of care was discussed; medications were reviewed and discharge planning was addressed. ________________________________________________________________________  Total NON critical care TIME:   20  Minutes    Total CRITICAL CARE TIME Spent:   Minutes non procedure based      Comments   >50% of visit spent in counseling and coordination of care     ________________________________________________________________________  Tawnya Watkins MD     Procedures: see electronic medical records for all procedures/Xrays and details which were not copied into this note but were reviewed prior to creation of Plan. LABS:  I reviewed today's most current labs and imaging studies.   Pertinent labs include:  Recent Labs     02/16/22 0447 02/15/22  0200   WBC 10.6 9.2   HGB 8.9* 8.7*   HCT 30.9* 29.8*   * 354     Recent Labs     02/17/22  0117 02/16/22  0447 02/15/22  0200   * 144 140   K 3.7 3.5 3.3*    106 108   CO2 38* 34* 30   * 98 100   BUN 5* 3* 3*   CREA 0.27* 0.26* 0.24*   CA 7.8* 8.2* 7.5*   MG  --  2.2 2.1   PHOS  --  2.7 2.3*       Signed: Tawnya Watkins MD

## 2022-02-17 NOTE — PROGRESS NOTES
Admit Date: 2022    POD * No surgery found *    Procedure:  * No surgery found *      Assessment:   Active Problems:    Sepsis ([address] (2022)      Sacral pressure ulcer (2022)      Frailty ()      Acute pain ()      Physical debility ()      Anorexia ()      Palliative care by specialist ()      Fatigue, unspecified type ()      1. Bacteremia with strep B and proteus mirabalis. Repeat cultures ok  2. 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 They likely will never heal.  Fecal diversion may help but comorbidities make it challenging. At this point he is declining fecal diversion  3. Chronic microcytic anemia.  hgb 8.3  4. Severe protein calorie malnutrition. Alb 2.6. This will prevent wound healing  5. Severe MS.    6.  New left hip deep pressure area that likely was POA and developing        Plan/Recommendations/Medical Decision Makin. Agree with wound care team recommendation for dressing care  2. Continue abx  3. Avoid pressure on wounds  4. I do not have much to add at this point and will plan to be available prn. Thank you      Subjective:     Patient has no new complaints. Objective:     Blood pressure 118/63, pulse 78, temperature 97.7 °F (36.5 °C), resp. rate 17, height 6' 1\" (1.854 m), weight 72.9 kg (160 lb 11.5 oz), SpO2 96 %. Temp (24hrs), Av.3 °F (36.8 °C), Min:97.7 °F (36.5 °C), Max:98.7 °F (37.1 °C)      Physical Exam:  LUNG: clear to auscultation bilaterally, HEART: regular rate and rhythm, S1, S2 normal, no murmur, click, rub or gallop, ABDOMEN: soft, non-tender.  Bowel sounds normal. No masses,  no organomegaly  EXT:  Contractures  SKIN:  Sacral/ischial wounds dressings intact      Labs:   Recent Results (from the past 48 hour(s))   GLUCOSE, POC    Collection Time: 02/15/22 11:39 AM   Result Value Ref Range    Glucose (POC) 115 65 - 117 mg/dL    Performed by Ildefonso Grimes  PCT    GLUCOSE, POC    Collection Time: 02/15/22  4:46 PM Result Value Ref Range    Glucose (POC) 148 (H) 65 - 117 mg/dL    Performed by Olivia Walker RN    GLUCOSE, POC    Collection Time: 02/15/22  8:38 PM   Result Value Ref Range    Glucose (POC) 101 65 - 117 mg/dL    Performed by Obdulio Mohamud (PCT)    METABOLIC PANEL, BASIC    Collection Time: 02/16/22  4:47 AM   Result Value Ref Range    Sodium 144 136 - 145 mmol/L    Potassium 3.5 3.5 - 5.1 mmol/L    Chloride 106 97 - 108 mmol/L    CO2 34 (H) 21 - 32 mmol/L    Anion gap 4 (L) 5 - 15 mmol/L    Glucose 98 65 - 100 mg/dL    BUN 3 (L) 6 - 20 MG/DL    Creatinine 0.26 (L) 0.70 - 1.30 MG/DL    BUN/Creatinine ratio 12 12 - 20      GFR est AA >60 >60 ml/min/1.73m2    GFR est non-AA >60 >60 ml/min/1.73m2    Calcium 8.2 (L) 8.5 - 10.1 MG/DL   MAGNESIUM    Collection Time: 02/16/22  4:47 AM   Result Value Ref Range    Magnesium 2.2 1.6 - 2.4 mg/dL   PHOSPHORUS    Collection Time: 02/16/22  4:47 AM   Result Value Ref Range    Phosphorus 2.7 2.6 - 4.7 MG/DL   CBC WITH AUTOMATED DIFF    Collection Time: 02/16/22  4:47 AM   Result Value Ref Range    WBC 10.6 4.1 - 11.1 K/uL    RBC 3.90 (L) 4.10 - 5.70 M/uL    HGB 8.9 (L) 12.1 - 17.0 g/dL    HCT 30.9 (L) 36.6 - 50.3 %    MCV 79.2 (L) 80.0 - 99.0 FL    MCH 22.8 (L) 26.0 - 34.0 PG    MCHC 28.8 (L) 30.0 - 36.5 g/dL    RDW 17.9 (H) 11.5 - 14.5 %    PLATELET 547 (H) 129 - 400 K/uL    MPV 9.5 8.9 - 12.9 FL    NRBC 0.0 0  WBC    ABSOLUTE NRBC 0.00 0.00 - 0.01 K/uL    NEUTROPHILS 80 (H) 32 - 75 %    LYMPHOCYTES 10 (L) 12 - 49 %    MONOCYTES 7 5 - 13 %    EOSINOPHILS 2 0 - 7 %    BASOPHILS 0 0 - 1 %    IMMATURE GRANULOCYTES 1 (H) 0.0 - 0.5 %    ABS. NEUTROPHILS 8.5 (H) 1.8 - 8.0 K/UL    ABS. LYMPHOCYTES 1.1 0.8 - 3.5 K/UL    ABS. MONOCYTES 0.7 0.0 - 1.0 K/UL    ABS. EOSINOPHILS 0.2 0.0 - 0.4 K/UL    ABS. BASOPHILS 0.0 0.0 - 0.1 K/UL    ABS. IMM.  GRANS. 0.1 (H) 0.00 - 0.04 K/UL    DF SMEAR SCANNED      RBC COMMENTS ANISOCYTOSIS  1+       GLUCOSE, POC    Collection Time: 02/16/22 6:30 AM   Result Value Ref Range    Glucose (POC) 99 65 - 117 mg/dL    Performed by Randol Dakins (PCT)    GLUCOSE, POC    Collection Time: 02/16/22 12:08 PM   Result Value Ref Range    Glucose (POC) 107 65 - 117 mg/dL    Performed by Porsha Howell RN    GLUCOSE, POC    Collection Time: 02/16/22  4:19 PM   Result Value Ref Range    Glucose (POC) 159 (H) 65 - 117 mg/dL    Performed by William Petty PCT    GLUCOSE, POC    Collection Time: 02/16/22  8:45 PM   Result Value Ref Range    Glucose (POC) 146 (H) 65 - 117 mg/dL    Performed by Josi Márquez PCT    METABOLIC PANEL, BASIC    Collection Time: 02/17/22  1:17 AM   Result Value Ref Range    Sodium 146 (H) 136 - 145 mmol/L    Potassium 3.7 3.5 - 5.1 mmol/L    Chloride 108 97 - 108 mmol/L    CO2 38 (H) 21 - 32 mmol/L    Anion gap 0 (L) 5 - 15 mmol/L    Glucose 104 (H) 65 - 100 mg/dL    BUN 5 (L) 6 - 20 MG/DL    Creatinine 0.27 (L) 0.70 - 1.30 MG/DL    BUN/Creatinine ratio 19 12 - 20      GFR est AA >60 >60 ml/min/1.73m2    GFR est non-AA >60 >60 ml/min/1.73m2    Calcium 7.8 (L) 8.5 - 10.1 MG/DL   GLUCOSE, POC    Collection Time: 02/17/22  6:34 AM   Result Value Ref Range    Glucose (POC) 100 65 - 117 mg/dL    Performed by Jackson Max PCT    GLUCOSE, POC    Collection Time: 02/17/22 11:27 AM   Result Value Ref Range    Glucose (POC) 103 65 - 117 mg/dL    Performed by Terrence Cortes PCT        Data Review images and reports reviewed

## 2022-02-17 NOTE — PROGRESS NOTES
CARDIOLOGY Progress Note    Patient ID:  Patient: Svetlana Munson  MRN: [id number]  Age: 80 y.o.  : 1950    Date of  Admission: 2022  6:34 PM   PCP:  Ranjan Valentino MD    Assessment: 1. Atrial fibrillation with RVR, paroxysmal.  Back in sinus. 2. CAD with remote coronary stenting. No angina. 3. Normal LV systolic function on echo EF 60%. 4. 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 Strep agalactiae and Proteus on BCX, Proteus UTI. 5. Sepsis, improving. Multiple sites for bacterial entry. 6. Multiple sclerosis with severe debility (functional quadriplegic). 7. Remote stroke in .  8. Full code. Plan:     1. Stop digoxin. 2. Didn't initially have BP to tolerate metoprolol. 3. OK with midodrine. 4. Decrease amiodarone to 400 mg daily. 5. On DVT prophylaxis, not a good chronic full anticoagulation candidate due to bleeding risk, anemia, etc.  I'll defer to the primary team on if and when anticoagulation dosing is escalated. BRITANY cancelled for tomorrow. [x]       High complexity decision making was performed in this patient at high risk for decompensation with multiple organ involvement. Svetlana Munson is a 80 y.o. male with a history of MS. He was admitted with sepsis and found to have bacteremia from both [address] and GNR. On antibiotics. His echo at the bedside today shows normal LV systolic function, mild MR, and a probable ~1 cm vegetation on the mitral valve. His initial ECG on  shows sinus rhythm, on tele today he's in Afib. He got IV metoprolol and his BP dropped, back up some with IVF. TODAY:  He denies chest pain, syncope, palpitations, MATUTE, palpitations. Eating better. No family history on file.      Allergies   Allergen Reactions    Beeswax Anaphylaxis    Bee Pollen Unknown (comments)    Simvastatin Not Reported This Time    Tolterodine Not Reported This Time    Venom-Wasp Not Reported This Time          Current Facility-Administered Medications   Medication Dose Route Frequency    heparin (porcine) pf 300 Units  300 Units InterCATHeter PRN    oxyCODONE IR (ROXICODONE) tablet 5 mg  5 mg Oral Q6H    acetaminophen (TYLENOL) tablet 1,000 mg  1,000 mg Oral TID    ampicillin-sulbactam (UNASYN) 3 g in 0.9% sodium chloride (MBP/ADV) 100 mL MBP  3 g IntraVENous Q6H    amiodarone (CORDARONE) tablet 400 mg  400 mg Oral BID    morphine injection 2 mg  2 mg IntraVENous Q4H PRN    [Held by provider] midodrine (PROAMATINE) tablet 5 mg  5 mg Oral TID WITH MEALS    [Held by provider] metoprolol tartrate (LOPRESSOR) tablet 12.5 mg  12.5 mg Oral Q12H    balsam peru-castor oiL (VENELEX) ointment   Topical BID    ferrous sulfate tablet 325 mg  325 mg Oral EVERY OTHER DAY    lidocaine/EPINEPHrine/tetracaine (L.E.T) topical gel  3 mL Topical Q12H    digoxin (LANOXIN) tablet 0.125 mg  0.125 mg Oral DAILY    dextrose 10% infusion 125-250 mL  125-250 mL IntraVENous PRN    sodium chloride (NS) flush 5-10 mL  5-10 mL IntraVENous PRN    ondansetron (ZOFRAN) injection 4 mg  4 mg IntraVENous Q6H PRN    aspirin chewable tablet 81 mg  81 mg Oral DAILY    albuterol-ipratropium (DUO-NEB) 2.5 MG-0.5 MG/3 ML  3 mL Nebulization Q4H PRN    guaiFENesin (ROBITUSSIN) 100 mg/5 mL oral liquid 200 mg  200 mg Oral Q6H PRN    dronabinoL (MARINOL) capsule 2.5 mg  2.5 mg Oral ACB&D    mirtazapine (REMERON) tablet 30 mg  30 mg Oral QHS    insulin lispro (HUMALOG) injection   SubCUTAneous AC&HS    glucose chewable tablet 16 g  4 Tablet Oral PRN    glucagon (GLUCAGEN) injection 1 mg  1 mg IntraMUSCular PRN    pantoprazole (PROTONIX) tablet 40 mg  40 mg Oral ACB    senna-docusate (PERICOLACE) 8.6-50 mg per tablet 2 Tablet  2 Tablet Oral QHS    sertraline (ZOLOFT) tablet 25 mg  25 mg Oral DAILY    glatiramer (COPAXONE) injection 20 mg  20 mg SubCUTAneous Q MON, WED & FRI    L.acidophilus-paracasei-S.thermophil-bifidobacter (RISAQUAD) 8 billion cell capsule  1 Capsule Oral DAILY    enoxaparin (LOVENOX) injection 40 mg  40 mg SubCUTAneous DAILY       Review of Symptoms:    General: +fever, chills, sweats, weakness, weight loss   Ear Nose and Throat: negative for rhinorrhea, pharyngitis, otalgia, tinnitus, speech or swallowing difficulties   Respiratory: + chronic cough, negative for SOB, sputum production, wheezing, MATUTE, pleuritic pain   Cardiology: negative for chest pain, palpitations, orthopnea, PND, edema, syncope   Gastrointestinal: negative for abdominal pain, N/V, dysphagia   Genitourinary: +incontinence      Objective:      Physical Exam:  Temp (24hrs), Av.3 °F (36.8 °C), Min:97.7 °F (36.5 °C), Max:98.7 °F (37.1 °C)    Patient Vitals for the past 8 hrs:   Pulse   22 1738 74   22 1509 75    Patient Vitals for the past 8 hrs:   Resp   22 1509 20    Patient Vitals for the past 8 hrs:   BP   22 1738 128/69   22 1509 (!) 115/59          Intake/Output Summary (Last 24 hours) at 2022 1855  Last data filed at 2022 0457  Gross per 24 hour   Intake    Output 1300 ml   Net -1300 ml       Nondiaphoretic, not in acute distress, slender male. Unlabored, clear to auscultation bilaterally anteriorly, symmetric air movement. Irregular rhythm, +soft systolic murmur, no rub. No peripheral edema. Palpable radial pulses bilaterally. Abdomen, soft, nontender, nondistended. Extremities without cyanosis or clubbing. Skin warm and dry. No rashes or ulcers. Neuro grossly nonfocal.  No tremor. Awake and appropriate. CARDIOGRAPHICS and STUDIES, I reviewed:    Telemetry:  Sinus rhythm. Labs:  No results for input(s): CPK, CKMB, CKNDX, TROIQ in the last 72 hours.     No lab exists for component: CPKMB  No results found for: CHOL, CHOLX, CHLST, CHOLV, HDL, HDLP, LDL, LDLC, DLDLP, TGLX, TRIGL, TRIGP, CHHD, CHHDX  No results for input(s): INR, PTP, APTT, INREXT, INREXT in the last 72 hours. Recent Labs     02/17/22  0117 02/16/22  0447 02/15/22  0200   * 144 140   K 3.7 3.5 3.3*    106 108   CO2 38* 34* 30   BUN 5* 3* 3*   CREA 0.27* 0.26* 0.24*   * 98 100   PHOS  --  2.7 2.3*   CA 7.8* 8.2* 7.5*   WBC  --  10.6 9.2   HGB  --  8.9* 8.7*   HCT  --  30.9* 29.8*   PLT  --  416* 354     No results for input(s): AP, TBIL, TP, ALB, GLOB, GGT, AML, LPSE in the last 72 hours. No lab exists for component: SGOT, GPT, AMYP, HLPSE  No components found for: GLPOC  No results for input(s): PH, PCO2, PO2 in the last 72 hours.         Meeta Vizcarra MD  2/17/2022

## 2022-02-17 NOTE — PROGRESS NOTES
Problem: Pressure Injury - Risk of  Goal: *Prevention of pressure injury  Description: Document Lewis Scale and appropriate interventions in the flowsheet.   2/17/2022 0737 by Anju Rios RN  Outcome: Progressing Towards Goal  Note: Pressure Injury Interventions:  Sensory Interventions: Assess changes in LOC,Minimize linen layers,Maintain/enhance activity level    Moisture Interventions: Absorbent underpads,Minimize layers    Activity Interventions: Increase time out of bed,PT/OT evaluation    Mobility Interventions: Assess need for specialty bed,PT/OT evaluation    Nutrition Interventions: Document food/fluid/supplement intake    Friction and Shear Interventions: Minimize layers             2/16/2022 1845 by Anju Rios RN  Outcome: Progressing Towards Goal  Note: Pressure Injury Interventions:  Sensory Interventions: Assess changes in LOC,Float heels,Keep linens dry and wrinkle-free,Minimize linen layers,Maintain/enhance activity level    Moisture Interventions: Absorbent underpads,Maintain skin hydration (lotion/cream),Internal/External urinary devices,Check for incontinence Q2 hours and as needed,Apply protective barrier, creams and emollients,Moisture barrier,Minimize layers    Activity Interventions: Increase time out of bed,Pressure redistribution bed/mattress(bed type)    Mobility Interventions: PT/OT evaluation,Pressure redistribution bed/mattress (bed type)    Nutrition Interventions: Offer support with meals,snacks and hydration,Document food/fluid/supplement intake    Friction and Shear Interventions: Apply protective barrier, creams and emollients,HOB 30 degrees or less,Lift team/patient mobility team,Minimize layers                Problem: Patient Education: Go to Patient Education Activity  Goal: Patient/Family Education  2/17/2022 0737 by Anju Rios RN  Outcome: Progressing Towards Goal  2/16/2022 1845 by Anju Rios RN  Outcome: Progressing Towards Goal     Problem: Falls - Risk of  Goal: *Absence of Falls  Description: Document Flavio Cancino Fall Risk and appropriate interventions in the flowsheet.   2/17/2022 0737 by Michelle Hernandez RN  Outcome: Progressing Towards Goal  Note: Fall Risk Interventions:  Mobility Interventions: Bed/chair exit alarm,Communicate number of staff needed for ambulation/transfer,Patient to call before getting OOB    Mentation Interventions: Bed/chair exit alarm,Adequate sleep, hydration, pain control    Medication Interventions: Bed/chair exit alarm,Evaluate medications/consider consulting pharmacy,Patient to call before getting OOB    Elimination Interventions: Bed/chair exit alarm,Call light in reach,Patient to call for help with toileting needs           2/16/2022 1845 by Michelle Hernandez RN  Outcome: Progressing Towards Goal  Note: Fall Risk Interventions:  Mobility Interventions: Bed/chair exit alarm,Patient to call before getting OOB,Communicate number of staff needed for ambulation/transfer    Mentation Interventions: Bed/chair exit alarm,Adequate sleep, hydration, pain control,Reorient patient,More frequent rounding    Medication Interventions: Bed/chair exit alarm,Patient to call before getting OOB,Teach patient to arise slowly    Elimination Interventions: Bed/chair exit alarm,Call light in reach,Patient to call for help with toileting needs,Toileting schedule/hourly rounds              Problem: Patient Education: Go to Patient Education Activity  Goal: Patient/Family Education  2/17/2022 0737 by Michelle Hernandez RN  Outcome: Progressing Towards Goal  2/16/2022 1845 by Michelle Hernandez RN  Outcome: Progressing Towards Goal     Problem: General Wound Care  Goal: *Infected Wound: Prevention of further infection and promotion of healing  Description: Infection control procedures (eg: clean dressings, clean gloves, hand washing, precautions to isolate wound from contamination, sterile instruments used for wound debridement) should be implemented. 2/17/2022 0737 by Anju Rios RN  Outcome: Progressing Towards Goal  2/16/2022 1845 by Anju Rios RN  Outcome: Progressing Towards Goal  Goal: Interventions  2/17/2022 0737 by Anju Rios RN  Outcome: Progressing Towards Goal  2/16/2022 1845 by Anju Rios RN  Outcome: Progressing Towards Goal     Problem: Diabetes Self-Management  Goal: *Disease process and treatment process  Description: Define diabetes and identify own type of diabetes; list 3 options for treating diabetes. Outcome: Progressing Towards Goal  Goal: *Incorporating nutritional management into lifestyle  Description: Describe effect of type, amount and timing of food on blood glucose; list 3 methods for planning meals. Outcome: Progressing Towards Goal  Goal: *Monitoring blood glucose, interpreting and using results  Description: Identify recommended blood glucose targets  and personal targets. Outcome: Progressing Towards Goal  Goal: *Prevention, detection, treatment of acute complications  Description: List symptoms of hyper- and hypoglycemia; describe how to treat low blood sugar and actions for lowering  high blood glucose level.   Outcome: Progressing Towards Goal

## 2022-02-17 NOTE — PROGRESS NOTES
Transition of Care Plan:  RUR: 18% moderate  Disposition: return to [facility] for LTC  Follow up appointments: PCP, speciality   DME needed: none anticipated   Transportation at [facility] or means to access home: n/a     IM Medicare Letter: to be provided  Is patient a BCPI-A Bundle: no              If yes, was Bundle Letter given?:    Is patient a [place] and connected with the [facility]?  yes              If yes, was [employer] transfer form completed and [facility] notified? VA has been notified, waiting on MD to complete his portion of transfer paperwork  Dick [phone]  Discharge Caregiver contacted prior to [address] needed?: not at this time     Initial note:  Chart reviewed. CM contacted Dr. Madonna Mortensen via [address] [place] [address] Avenue regarding pt d/c date and need of negative rapid Covid test 24hrs prior to d/c. CM was contacted by Dr. Madonna Mortensen and confirmed that he is waiting for the ID recommendation and pt can d/c tomorrow if this is obtained. CM received a message from [place] at [facility] that they will be unable to obtain the requested \"sand bed\" for this pt. Jannet Bridget notified and she will f/u with the facility. CM will continue to monitor for d/c needs.     Marlynn Schirmer, MSN  Care Manager  [phone]

## 2022-02-17 NOTE — PROGRESS NOTES
Problem: Pressure Injury - Risk of  Goal: *Prevention of pressure injury  Description: Document Lewis Scale and appropriate interventions in the flowsheet.   Outcome: Progressing Towards Goal  Note: Pressure Injury Interventions:  Sensory Interventions: Assess changes in LOC,Minimize linen layers,Maintain/enhance activity level    Moisture Interventions: Absorbent underpads,Minimize layers    Activity Interventions: Increase time out of bed,PT/OT evaluation    Mobility Interventions: Assess need for specialty bed,PT/OT evaluation    Nutrition Interventions: Document food/fluid/supplement intake    Friction and Shear Interventions: Minimize layers

## 2022-02-18 VITALS
OXYGEN SATURATION: 94 % | TEMPERATURE: 98 F | RESPIRATION RATE: 20 BRPM | DIASTOLIC BLOOD PRESSURE: 61 MMHG | WEIGHT: 160.27 LBS | HEIGHT: 73 IN | SYSTOLIC BLOOD PRESSURE: 121 MMHG | HEART RATE: 81 BPM | BODY MASS INDEX: 21.24 KG/M2

## 2022-02-18 LAB
ANION GAP SERPL CALC-SCNC: 1 MMOL/L (ref 5–15)
BUN SERPL-MCNC: 4 MG/DL (ref 6–20)
BUN/CREAT SERPL: 19 (ref 12–20)
CALCIUM SERPL-MCNC: 8.3 MG/DL (ref 8.5–10.1)
CHLORIDE SERPL-SCNC: 100 MMOL/L (ref 97–108)
CO2 SERPL-SCNC: 41 MMOL/L (ref 21–32)
COVID-19 RAPID TEST, COVR: NOT DETECTED
CREAT SERPL-MCNC: 0.21 MG/DL (ref 0.7–1.3)
GLUCOSE BLD STRIP.AUTO-MCNC: 110 MG/DL (ref 65–117)
GLUCOSE BLD STRIP.AUTO-MCNC: 88 MG/DL (ref 65–117)
GLUCOSE BLD STRIP.AUTO-MCNC: 98 MG/DL (ref 65–117)
GLUCOSE SERPL-MCNC: 80 MG/DL (ref 65–100)
MAGNESIUM SERPL-MCNC: 2.5 MG/DL (ref 1.6–2.4)
POTASSIUM SERPL-SCNC: 3.7 MMOL/L (ref 3.5–5.1)
SERVICE CMNT-IMP: NORMAL
SODIUM SERPL-SCNC: 142 MMOL/L (ref 136–145)
SOURCE, COVRS: NORMAL

## 2022-02-18 PROCEDURE — 74011250637 HC RX REV CODE- 250/637: Performed by: NURSE PRACTITIONER

## 2022-02-18 PROCEDURE — 80048 BASIC METABOLIC PNL TOTAL CA: CPT

## 2022-02-18 PROCEDURE — 74011250637 HC RX REV CODE- 250/637: Performed by: HOSPITALIST

## 2022-02-18 PROCEDURE — 74011000258 HC RX REV CODE- 258: Performed by: INTERNAL MEDICINE

## 2022-02-18 PROCEDURE — 87635 SARS-COV-2 COVID-19 AMP PRB: CPT

## 2022-02-18 PROCEDURE — 74011000250 HC RX REV CODE- 250: Performed by: INTERNAL MEDICINE

## 2022-02-18 PROCEDURE — 99233 SBSQ HOSP IP/OBS HIGH 50: CPT | Performed by: INTERNAL MEDICINE

## 2022-02-18 PROCEDURE — 36415 COLL VENOUS BLD VENIPUNCTURE: CPT

## 2022-02-18 PROCEDURE — 74011250637 HC RX REV CODE- 250/637: Performed by: INTERNAL MEDICINE

## 2022-02-18 PROCEDURE — 74011250636 HC RX REV CODE- 250/636: Performed by: INTERNAL MEDICINE

## 2022-02-18 PROCEDURE — 74011250636 HC RX REV CODE- 250/636: Performed by: HOSPITALIST

## 2022-02-18 PROCEDURE — 83735 ASSAY OF MAGNESIUM: CPT

## 2022-02-18 PROCEDURE — 82962 GLUCOSE BLOOD TEST: CPT

## 2022-02-18 RX ORDER — FUROSEMIDE 10 MG/ML
20 INJECTION INTRAMUSCULAR; INTRAVENOUS ONCE
Status: COMPLETED | OUTPATIENT
Start: 2022-02-18 | End: 2022-02-18

## 2022-02-18 RX ORDER — AMIODARONE HYDROCHLORIDE 200 MG/1
200 TABLET ORAL DAILY
Qty: 30 TABLET | Refills: 0 | Status: SHIPPED
Start: 2022-02-19 | End: 2022-03-21

## 2022-02-18 RX ORDER — FUROSEMIDE 10 MG/ML
20 INJECTION INTRAMUSCULAR; INTRAVENOUS ONCE
Status: DISCONTINUED | OUTPATIENT
Start: 2022-02-18 | End: 2022-02-18

## 2022-02-18 RX ORDER — AMIODARONE HYDROCHLORIDE 200 MG/1
200 TABLET ORAL DAILY
Status: DISCONTINUED | OUTPATIENT
Start: 2022-02-18 | End: 2022-02-18 | Stop reason: HOSPADM

## 2022-02-18 RX ORDER — OXYCODONE HYDROCHLORIDE 5 MG/1
5 TABLET ORAL
Qty: 21 TABLET | Refills: 0 | Status: SHIPPED | OUTPATIENT
Start: 2022-02-18 | End: 2022-03-04

## 2022-02-18 RX ADMIN — AMIODARONE HYDROCHLORIDE 200 MG: 200 TABLET ORAL at 09:12

## 2022-02-18 RX ADMIN — Medication 3 ML: at 09:14

## 2022-02-18 RX ADMIN — ACETAMINOPHEN 1000 MG: 500 TABLET ORAL at 09:12

## 2022-02-18 RX ADMIN — OXYCODONE 5 MG: 5 TABLET ORAL at 10:55

## 2022-02-18 RX ADMIN — DRONABINOL 2.5 MG: 2.5 CAPSULE ORAL at 09:12

## 2022-02-18 RX ADMIN — AMPICILLIN SODIUM AND SULBACTAM SODIUM 3 G: 2; 1 INJECTION, POWDER, FOR SOLUTION INTRAMUSCULAR; INTRAVENOUS at 04:08

## 2022-02-18 RX ADMIN — SERTRALINE 25 MG: 50 TABLET, FILM COATED ORAL at 09:12

## 2022-02-18 RX ADMIN — AMPICILLIN SODIUM AND SULBACTAM SODIUM 3 G: 2; 1 INJECTION, POWDER, FOR SOLUTION INTRAMUSCULAR; INTRAVENOUS at 10:55

## 2022-02-18 RX ADMIN — OXYCODONE 5 MG: 5 TABLET ORAL at 04:08

## 2022-02-18 RX ADMIN — ENOXAPARIN SODIUM 40 MG: 100 INJECTION SUBCUTANEOUS at 09:12

## 2022-02-18 RX ADMIN — FUROSEMIDE 20 MG: 10 INJECTION, SOLUTION INTRAMUSCULAR; INTRAVENOUS at 10:55

## 2022-02-18 RX ADMIN — Medication 1 CAPSULE: at 09:12

## 2022-02-18 RX ADMIN — PANTOPRAZOLE SODIUM 40 MG: 40 TABLET, DELAYED RELEASE ORAL at 09:12

## 2022-02-18 RX ADMIN — CASTOR OIL AND BALSAM, PERU: 788; 87 OINTMENT TOPICAL at 09:12

## 2022-02-18 RX ADMIN — ACETAZOLAMIDE SODIUM 250 MG: 500 INJECTION, POWDER, LYOPHILIZED, FOR SOLUTION INTRAVENOUS at 09:18

## 2022-02-18 RX ADMIN — ASPIRIN 81 MG CHEWABLE TABLET 81 MG: 81 TABLET CHEWABLE at 09:12

## 2022-02-18 NOTE — PROGRESS NOTES
0700: End of Shift Note    Bedside shift change report given to ORAL Morley (oncoming nurse) by Benita Rai (offgoing nurse). Report included the following information SBAR, Kardex, Intake/Output, MAR, Recent Results and Cardiac Rhythm NSR    Shift worked:  Night     Shift summary and any significant changes:     Patient CO2 came back critical at 39, NP notified. Concerns for physician to address:       Zone phone for oncoming shift:          Activity:  Activity Level: Bed Rest  Number times ambulated in hallways past shift: 0  Number of times OOB to chair past shift: 0    Cardiac:   Cardiac Monitoring: Yes      Cardiac Rhythm: Sinus Rhythm    Access:   Current line(s): PICC     Genitourinary:   Urinary status: mauro    Respiratory:   O2 Device: Nasal cannula  Chronic home O2 use?: NO  Incentive spirometer at bedside: YES     GI:  Last Bowel Movement Date: 02/10/22  Current diet:  ADULT DIET Regular  ADULT ORAL NUTRITION SUPPLEMENT Breakfast, Dinner; Renal Supplement  Passing flatus: YES  Tolerating current diet: YES       Pain Management:   Patient states pain is manageable on current regimen: YES    Skin:  Lewis Score: 11  Interventions: increase time out of bed and PT/OT consult    Patient Safety:  Fall Score:  Total Score: 3  Interventions: bed/chair alarm, gripper socks and pt to call before getting OOB  High Fall Risk: Yes    Length of Stay:  Expected LOS: 4d 19h  Actual LOS: 9      Valentina Burrell

## 2022-02-18 NOTE — DISCHARGE INSTRUCTIONS
HOSPITALIST DISCHARGE INSTRUCTIONS    NAME: Regan Collins   :  1950   MRN:  [id number]     Date/Time:  2022 1:08 PM    ADMIT DATE: 2022   DISCHARGE DATE: 2022     Attending Physician: Amanda Suazo MD      Medications: Per above medication reconciliation. Pain Management: per above medications    Recommended diet: Regular Diet    Recommended activity: Activity as tolerated    Wound care:   Daily Wound care for the Sacrum AND bilateral Ischium wounds - all Stage 4 pressure injuries: Pre-medicate for pain. Cleanse each wound with Normal Saline and wipe with gauze to remove the old drainage and wound debris. Pack each wound with NS  moistened Hydrofera Blue and then cover with Foam dressings with the date written on them. Wound Care for the Right lateral Ankle: Cleanse this wound with NS and wipe with gauze. Pack the wound with NS moist Plain packing strips and cover with foam dressing with the date written on it. Indwelling devices:    PICC line 2022  [place] changed 2/10/22  Care per routine    Supplemental Oxygen: Chronic Oxygen,  4  LNC at baseline    Required Lab work: Per SNF routine    Glucose management:  None    Code status: Full      Antibiotic orders for discharge  Unasyn 3 g IV every 6 hours end date 3/25, pull PICC at end of therapy. Weekly CBC, CMP & ESR/CRP every other week fax reports to [phone] with critical labs at [phone]  Encourage adequate fluids, daily probiotic/yogurt  ID virtual follow-up 3/24 at 11 AM.      Outside physician follow up: Follow-up Information     Follow up With Specialties Details Why Contact Info    Verito Maldonado MD Family Medicine   [address] [place] [address] [phone]                 Skilled nursing facility/ SNF MD responsible for above on discharge. Information obtained by :  I understand that if any problems occur once I am at home I am to contact my physician.     I understand and acknowledge receipt of the instructions indicated above.                                                                                                                                            Physician's or R.N.'s Signature                                                                  Date/Time                                                                                                                                              Patient or Repres

## 2022-02-18 NOTE — DISCHARGE SUMMARY
Hospitalist Discharge Summary     Patient ID:  Hussein Novak  [id number]  82 y.o.  1950    PCP on record: Leon Hill MD    Admit date: 2/9/2022  Discharge date and time: 2/18/2022      DISCHARGE DIAGNOSIS:    Sepsis (tachycardia and fever) due to bacteremia and infected sacral and left ischium pressure ulcers with history of chronic osteomyelitis, POA  Atrial Fibrillation (now in NSR)  UTI in settings of indwelling mauro  MS with severe debility  Functional quadriplegia   Dysphagia  Chronic cough, frequent coughing spells  Chronic resp failure on oxygen  Neurogenic bladder with indwelling Mauro  CAD  Moderate protein calorie malnutrition  Adult failure to thrive  Depression  Hx of PE: Anticoagulation was discontinued due to recurrent hematuria      CONSULTATIONS:  IP CONSULT TO HOSPITALIST  IP CONSULT TO GENERAL SURGERY  IP CONSULT TO INFECTIOUS DISEASES  IP CONSULT TO CARDIOLOGY    Excerpted HPI from H&P of Sreekanth Hall MD:  CHIEF COMPLAINT: fever      HISTORY OF PRESENT ILLNESS:     Aracelis Armendariz is a 80 y.o.  male who presents with above complaints. Patient is getting all his care at [facility]. [facility] is on diversion, so he was brought to [facility]. Patient with history of MS and severe debility, he is bedbound. He has history of multiple pressure ulcers. History of chronic osteomyelitis. He started with fever 102 and tachycardia since this morning. He was sent to the emergency room for further evaluation. He admits to pain in his back 4/10 now. He reports that it is his chronic level of pain and he is comfortable with it.  ______________________________________________________________________  DISCHARGE SUMMARY/HOSPITAL COURSE:  for full details see H&P, daily progress notes, labs, consult notes.      Sepsis (tachycardia and fever) due to bacteremia and infected sacral and left ischium pressure ulcers with history of chronic osteomyelitis, POA  CT scan showed 1.  Deep soft tissue ulcerations extending to the bilateral ischial tuberosities with associated acute osteomyelitis. 2.  Chronic osteomyelitis involving the sacrococcygeal junction with overlying  soft tissue ulceration and abscess. 3.  Bilateral femoral head avascular necrosis with subtle subchondral collapse     -Bacteremia with strep agalactiae sero Gp B Strep 1/2 ,P.mrabilis 2nd of 2  -BC- 2/9 -Strep agalactiae Gp B beta hemolytic  1/2, P.mirabilis 2nd of 2 bottles  -Wound cultures-2/9-light P.mirabilis, light strep Gp B beta hemolytic, light alpha strep, moderate Bacteroides uniformis, beta lactamase negative. -ECHO showed There is flail anterior mitral valve leaflet with ruptured chordae vs can not rule out vegetation. Moderate eccentric posteriorly directed mitral regurgitation.     -S/p local debridement at bedside by Gen Surgery today 2/14  -declined diverting colostomy   -Wound care following  -discussed BRITANY with mPOA, cardiology and ID. Patient is not low risk for the procedure and likely will not change abx duration. After weight risk / benefits, we have decided not to pursue test.    -S/P PICC line 2/17/22  -ID abx recs:  Antibiotic orders for discharge  Unasyn 3 g IV every 6 hours end date 3/25, pull PICC at end of therapy. Weekly CBC, CMP & ESR/CRP every other week fax reports to [phone], called with critical labs at [phone]  Encourage adequate fluids, daily probiotic/yogurt  ID virtual follow-up 3/24 at 11 AM.     -DC planning back to [address] [place] [address]      Atrial Fibrillation (now in NSR)  Started on amiodarone   BB DC'd due to BP issues  No AC indicated right now given history of hematuria  BP has been stable. Last dose of midodrine 2/16  Cardiology input appreciated. Follow up with Dr Lanny Harada in 3 months.   Would consider switching amiodarone to multaq then if appropriate     UTI in settings of indwelling mauro  On Unasyn  Urine culture +ve for proteus     MS with severe debility  Functional quadriplegia   bed bound   Continue Copaxone injections  Palliative care following for pain management recommendations  Tolerating oxycodone every 4 hours prn pain     Dysphagia  On regular diet at the nursing home per family request  Multiple MBS done in the past but showing some level of dysphagia  Family and patient is not interested in in speech eval or another MBS  Patient and family is aware of risk of aspiration and accepting it fully  HOB at 45 degree of the time  Aspiration precautions  Supervised feeding  Appetite stimulant     Chronic cough, frequent coughing spells  Chronic resp failure on oxygen  -prior Xray shows right lung fibrotic changes. Suspect from prior aspiration events  -per caregiver, patient always sounds congested      Neurogenic bladder with indwelling Guthrie  CAD: Continue aspirin  Moderate protein calorie malnutrition: Dietary consult  Adult failure to thrive: Continue Remeron and Marinol  Depression: Continue Zoloft and Remeron  Hx of PE: Anticoagulation was discontinued due to recurrent hematuria    _______________________________________________________________________  Patient seen and examined by me on discharge day. Pertinent Findings:  Gen:    Not in distress  Chest: Coarse BS, no wheezing  CVS:   Regular rhythm. Abd:  Soft, not distended, not tender  Neuro:  Alert, Oriented x 4, quadriplegia  _______________________________________________________________________  DISCHARGE MEDICATIONS:   Current Discharge Medication List      START taking these medications    Details   amiodarone (CORDARONE) 200 mg tablet Take 1 Tablet by mouth daily for 30 days. Qty: 30 Tablet, Refills: 0  Start date: 2/19/2022, End date: 3/21/2022      ampicillin-sulbactam 3 gram 3 g IVPB 3 g by IntraVENous route every six (6) hours for 142 doses.   Qty: 142 Dose, Refills: 0  Start date: 2/18/2022, End date: 3/26/2022         CONTINUE these medications which have CHANGED    Details   oxyCODONE IR (ROXICODONE) 5 mg immediate release tablet Take 1 Tablet by mouth every four (4) hours as needed for Pain for up to 14 days. Max Daily Amount: 30 mg.  Qty: 21 Tablet, Refills: 0  Start date: 2/18/2022, End date: 3/4/2022    Associated Diagnoses: Pressure injury of sacral region, stage 4 (HCC)         CONTINUE these medications which have NOT CHANGED    Details   acetaminophen (TYLENOL) 500 mg tablet Take 1,000 mg by mouth every eight (8) hours as needed (moderate pain). ascorbic acid, vitamin C, (VITAMIN C) 500 mg tablet Take 500 mg by mouth daily. aspirin delayed-release 81 mg tablet Take 81 mg by mouth daily. bisacodyL (DULCOLAX) 10 mg supp Insert 10 mg into rectum daily as needed for Constipation. cholecalciferol (VITAMIN D3) (2,000 UNITS /50 MCG) cap capsule Take 2,000 Units by mouth daily. cyanocobalamin (VITAMIN B12) 100 mcg tablet Take 200 mcg by mouth daily. docusate sodium (COLACE) 100 mg capsule Take 100 mg by mouth nightly. ferrous sulfate 325 mg (65 mg iron) tablet Take 325 mg by mouth every other day. folic acid (FOLVITE) 1 mg tablet Take 2 mg by mouth daily. glatiramer (COPAXONE) 40 mg/mL syrg 40 mg by SubCUTAneous route every Monday, Wednesday, Friday. guaiFENesin (ORGANIDIN) 200 mg tablet Take 400 mg by mouth every eight (8) hours as needed for Congestion. albuterol-ipratropium (DUO-NEB) 2.5 mg-0.5 mg/3 ml nebu 3 mL by Nebulization route every four (4) hours as needed for Wheezing or Shortness of Breath. !! lidocaine-vit e-aloe vera-wayne 2 % topical gel Apply 1 Each to affected area Every Thursday. Apply to ischiums and right ankle prior to wound care visit      !! lidocaine-vit e-aloe vera-wayne 2 % topical gel Apply 1 Each to affected area Every Thursday. Apply to sacrum prior to wound care      dronabinoL (MARINOL) 2.5 mg capsule Take 2.5 mg by mouth two (2) times a day. mirtazapine (REMERON) 30 mg tablet Take 30 mg by mouth nightly. insulin aspart U-100 (NOVOLOG) 100 unit/mL injection by SubCUTAneous route Before breakfast, lunch, and dinner. 150-200 = 2 units  201-250 = 4 units  251-300 = 5 units  301-350 = 7 units  351-400 = 9 units  401+ = 10 units      omeprazole (PRILOSEC) 40 mg capsule Take 40 mg by mouth daily. senna (SENOKOT) 8.6 mg tablet Take 2 Tablets by mouth nightly. sertraline (ZOLOFT) 25 mg tablet Take 25 mg by mouth daily. zinc sulfate (Zinc-220) 50 mg zinc (220 mg) capsule Take 1 Capsule by mouth every other day. !! - Potential duplicate medications found. Please discuss with provider. STOP taking these medications       polyethylene glycol (Miralax) 17 gram packet Comments:   Reason for Stopping:         therapeutic multivitamin (THERAGRAN) tablet Comments:   Reason for Stopping:               My Recommended Diet, Activity, Wound Care, and follow-up labs are listed in the patient's Discharge Insturctions which I have personally completed and reviewed.   Risk of deterioration: Low    Condition at Discharge:  Stable  _____________________________________________________________________    Disposition  SNF/LTC  ____________________________________________________________________    Care Plan discussed with:   Patient, Family, RN, Care Manager, Consultant    ____________________________________________________________________    Code Status: Full Code  ____________________________________________________________________      Condition at Discharge:  Stable  _____________________________________________________________________  Follow up with:   PCP : Wilian Huang MD  Follow-up Information     Follow up With Specialties Details [facility], [address] [place] [address] Nw, [address]   [address] [place] [address] [phone]      Nj Irizarry MD Cardio Vascular Surgery, Clinical Cardiac Electrophysiology, Cardiology In 3 months Please schedule appointment to address continuing or stopping his amiodarone  [address]  [address]  [facility]  [phone]      Marie Kemp MD Infectious Disease, Internal Medicine On 3/24/2022 ID virtual follow-up 3/24 at 11 AM. [address] [facility]  [phone]                Total time in minutes spent coordinating this discharge (includes going over instructions, follow-up, prescriptions, and preparing report for sign off to her PCP) :  35 minutes    Signed:  Heath Turpin MD

## 2022-02-18 NOTE — PROGRESS NOTES
[facility] to [address]                                                                        80 y.o.   male    [facility]   Room: 2209/01    [facility] 2 CARDIOPULMONARY CARE  Unit Phone# :  [phone]      [address]  [facility] [place] [address]  Dept: [phone]  Loc: [phone]                    SITUATION     Admitted:  2/9/2022         Attending Provider:  Darek Greer MD       Consultations:  IP CONSULT TO HOSPITALIST  IP CONSULT TO GENERAL SURGERY  IP CONSULT TO INFECTIOUS DISEASES  IP CONSULT TO CARDIOLOGY    PCP:  Wilian Huang MD   [phone]    Treatment Team: Attending Provider: Darek Greer MD; Consulting Provider: Marcela Connors MD; Consulting Provider: Isa River MD; Utilization Review: Alonzo Weeks RN; Consulting Provider: Dorcas Greene MD; Consulting Provider: Nj Irizarry MD; Consulting Provider: Johnathan Emanuel NP; Care Manager: Jose Giraldo RN; Primary Nurse: Trevor Estrada RN    Admitting Dx:  Sepsis [facility]) [A41.9]  Sacral pressure ulcer [L89.159]       Principal Problem: <principal problem not specified>    * No surgery found * of      BY: * Surgery not found *             ON: * No surgery found *                  Code Status: Full Code                Advance Directives:   Advance Care Planning 2/12/2022   Confirm Advance Directive Yes, on file    (Send w/patient)   Yes Not W Pt       Isolation:  There are currently no Active Isolations       MDRO: No current active infections    Pain Medications given:  tylenol and oxycodone   Last dose: 2/18/2022 and tylenol at 0915 at  oxycodone at [address] needed: yes  Type of equipment:      (Not currently on dialysis)  (Not currently on dialysis)  (Not currently on dialysis)     BACKGROUND     Allergies:   Allergies   Allergen Reactions    Beeswax Anaphylaxis    Bee Pollen Unknown (comments)    Simvastatin Not Reported This Time    Tolterodine Not Reported This Time    Venom-Wasp Not Reported This Time       Past Medical History:   Diagnosis Date    [facility]     Stroke [facility]        Past Surgical History:   Procedure Laterality Date    HX CORONARY STENT PLACEMENT      x7        Medications Prior to Admission   Medication Sig    acetaminophen (TYLENOL) 500 mg tablet Take 1,000 mg by mouth every eight (8) hours as needed (moderate pain).  ascorbic acid, vitamin C, (VITAMIN C) 500 mg tablet Take 500 mg by mouth daily.  aspirin delayed-release 81 mg tablet Take 81 mg by mouth daily.  bisacodyL (DULCOLAX) 10 mg supp Insert 10 mg into rectum daily as needed for Constipation.  cholecalciferol (VITAMIN D3) (2,000 UNITS /50 MCG) cap capsule Take 2,000 Units by mouth daily.  cyanocobalamin (VITAMIN B12) 100 mcg tablet Take 200 mcg by mouth daily.  docusate sodium (COLACE) 100 mg capsule Take 100 mg by mouth nightly.  ferrous sulfate 325 mg (65 mg iron) tablet Take 325 mg by mouth every other day.  folic acid (FOLVITE) 1 mg tablet Take 2 mg by mouth daily.  glatiramer (COPAXONE) 40 mg/mL syrg 40 mg by SubCUTAneous route every Monday, Wednesday, Friday.  polyethylene glycol (Miralax) 17 gram packet Take 17 g by mouth daily.  guaiFENesin (ORGANIDIN) 200 mg tablet Take 400 mg by mouth every eight (8) hours as needed for Congestion.  albuterol-ipratropium (DUO-NEB) 2.5 mg-0.5 mg/3 ml nebu 3 mL by Nebulization route every four (4) hours as needed for Wheezing or Shortness of Breath.  lidocaine-vit e-aloe vera-wayne 2 % topical gel Apply 1 Each to affected area Every Thursday. Apply to ischiums and right ankle prior to wound care visit    lidocaine-vit e-aloe vera-wayne 2 % topical gel Apply 1 Each to affected area Every Thursday.  Apply to sacrum prior to wound care    dronabinoL (MARINOL) 2.5 mg capsule Take 2.5 mg by mouth two (2) times a day.  mirtazapine (REMERON) 30 mg tablet Take 30 mg by mouth nightly.  therapeutic multivitamin (THERAGRAN) tablet Take 1 Tablet by mouth daily.  insulin aspart U-100 (NOVOLOG) 100 unit/mL injection by SubCUTAneous route Before breakfast, lunch, and dinner. 150-200 = 2 units  201-250 = 4 units  251-300 = 5 units  301-350 = 7 units  351-400 = 9 units  401+ = 10 units    omeprazole (PRILOSEC) 40 mg capsule Take 40 mg by mouth daily.  senna (SENOKOT) 8.6 mg tablet Take 2 Tablets by mouth nightly.  sertraline (ZOLOFT) 25 mg tablet Take 25 mg by mouth daily.  zinc sulfate (Zinc-220) 50 mg zinc (220 mg) capsule Take 1 Capsule by mouth every other day.  [DISCONTINUED] oxyCODONE IR (ROXICODONE) 5 mg immediate release tablet Take 5 mg by mouth every four (4) hours as needed for Pain. Hard scripts included in transfer packet yes    Vaccinations: There is no immunization history on file for this patient. Readmission Risks:    Known Risks: The Charlson CoMorbitiy Index tool is an evidenced based tool that has more automatic generated information. The tool looks at many different items such as the age of the patient, how many times they were admitted in the last calendar year, current length of stay in the hospital and their diagnosis. All of these items are pulled automatically from information documented in the chart from various places and will generate a score that predicts whether a patient is at low (less than 13), medium (13-20) or high (21 or greater) risk of being readmitted.         ASSESSMENT                Temp: 98 °F (36.7 °C) (02/18/22 1502) Pulse (Heart Rate): 81 (02/18/22 1502)     Resp Rate: 20 (02/18/22 1502)           BP: 121/61 (02/18/22 1502)     O2 Sat (%): 94 % (02/18/22 1502)     Weight: 72.7 kg (160 lb 4.4 oz)    Height: 6' 1\" (185.4 cm) (02/11/22 1936)       If above not within 1 hour of discharge:    BP:_____  P:____  R:____ T:_____ O2 Sat: ___%  O2: ______    Active Orders   Diet    ADULT DIET Regular         Orientation: only aware of  time, place and person     Active Behaviors: None                                   Active Lines/Drains:  (Peg Tube / Guthrie / CL or S/L?): yes    Urinary Status: Guthrie     Last BM: Last Bowel Movement Date: 02/10/22     Skin Integrity: Wound (add Wound LDA)             Mobility: Completely immobile   Weight Bearing Status: WBAT (Weight Bearing as Tolerated)                Lab Results   Component Value Date/Time    Glucose 80 02/18/2022 04:14 AM    Hemoglobin A1c 6.6 (H) 02/11/2022 02:26 PM    INR 1.3 (H) 02/10/2022 04:00 AM    HGB 8.9 (L) 02/16/2022 04:47 AM    HGB 8.7 (L) 02/15/2022 02:00 AM        RECOMMENDATION     See After Visit Summary (AVS) for:  · Discharge instructions  · After [address]   · Special equipment needed (entered pre-discharge by Care Management)  · Medication Reconciliation    · Follow up Appointment(s)         Report given/sent by:  Ness Farrell RN                    Verbal report given to: Paul Gallardo nurse  FAXED to:           Estimated discharge time:  2/18/2022 at [phone]

## 2022-02-18 NOTE — PROGRESS NOTES
Problem: Pressure Injury - Risk of  Goal: *Prevention of pressure injury  Description: Document Lewis Scale and appropriate interventions in the flowsheet. Outcome: Progressing Towards Goal  Note: Pressure Injury Interventions:  Sensory Interventions: Assess changes in LOC,Minimize linen layers,Maintain/enhance activity level    Moisture Interventions: Absorbent underpads,Minimize layers    Activity Interventions: PT/OT evaluation,Pressure redistribution bed/mattress(bed type)    Mobility Interventions: HOB 30 degrees or less,PT/OT evaluation    Nutrition Interventions: Document food/fluid/supplement intake    Friction and Shear Interventions: Minimize layers                Problem: Patient Education: Go to Patient Education Activity  Goal: Patient/Family Education  Outcome: Progressing Towards Goal     Problem: Falls - Risk of  Goal: *Absence of Falls  Description: Document Bianca Fall Risk and appropriate interventions in the flowsheet.   Outcome: Progressing Towards Goal  Note: Fall Risk Interventions:  Mobility Interventions: Bed/chair exit alarm,Communicate number of staff needed for ambulation/transfer,Patient to call before getting OOB    Mentation Interventions: Adequate sleep, hydration, pain control,Bed/chair exit alarm    Medication Interventions: Bed/chair exit alarm,Evaluate medications/consider consulting pharmacy,Patient to call before getting OOB    Elimination Interventions: Bed/chair exit alarm,Call light in reach,Patient to call for help with toileting needs    History of Falls Interventions: Bed/chair exit alarm         Problem: Patient Education: Go to Patient Education Activity  Goal: Patient/Family Education  Outcome: Progressing Towards Goal     Problem: General Wound Care  Goal: *Infected Wound: Prevention of further infection and promotion of healing  Description: Infection control procedures (eg: clean dressings, clean gloves, hand washing, precautions to isolate wound from contamination, sterile instruments used for wound debridement) should be implemented.   Outcome: Progressing Towards Goal

## 2022-02-18 NOTE — TELEPHONE ENCOUNTER
Please add patient to ID schedule as follows. Patient notification not required.   ID virtual follow-up 3/24 at 11 AM.

## 2022-02-18 NOTE — PROGRESS NOTES
CARDIOLOGY Progress Note    Patient ID:  Patient: Sharmila Rincon  MRN: [id number]  Age: 80 y.o.  : 1950    Date of  Admission: 2022  6:34 PM   PCP:  Lane Canas MD    Assessment: 1. Atrial fibrillation with RVR, paroxysmal.  Back in sinus. 2. CAD with remote coronary stenting. No angina. 3. Normal LV systolic function on echo EF 60%. 4. 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 Strep agalactiae and Proteus on BCX, Proteus UTI. 5. Sepsis, improving. Multiple sites for bacterial entry. 6. Multiple sclerosis with severe debility (functional quadriplegic). 7. Remote stroke in .  8. R upper and mid-lung fibrosis suggested on CXR, prior to starting amiodarone. 9. Full code. Plan:     1. I will decrease amiodarone further to 200 mg daily. 2. Stopped digoxin. 3. Didn't initially have BP to tolerate metoprolol. 4. OK with midodrine IF NEEDED. His BP has done fine off it. 5. On DVT prophylaxis, not a good chronic full anticoagulation candidate due to bleeding risk, anemia, etc.  I'll defer to the primary team on if and when anticoagulation dosing is escalated. I would try to stop amiodarone within 3 months. Probably change him to a different antiarrhythmic like Multaq 400 mg po BID with food (if he's eating). Important that he follow-up with me within 3 months. This can be a VIRTUAL VISIT if needed. [x]       High complexity decision making was performed in this patient at high risk for decompensation with multiple organ involvement. Sharmila Rincon is a 80 y.o. male with a history of MS. He was admitted with sepsis and found to have bacteremia from both 2 Yaya McCormick and GNR. On antibiotics. His echo at the bedside today shows normal LV systolic function, mild MR, and a probable ~1 cm vegetation on the mitral valve.   He was started on amiodarone for Afib with RVR, has been in sinus rhythm since. TODAY:  He denies chest pain, syncope, palpitations, MATUTE, palpitations. Not eating well this AM.    No family history on file.      Allergies   Allergen Reactions    Beeswax Anaphylaxis    Bee Pollen Unknown (comments)    Simvastatin Not Reported This Time    Tolterodine Not Reported This Time    Venom-Wasp Not Reported This Time          Current Facility-Administered Medications   Medication Dose Route Frequency    acetaZOLAMIDE (DIAMOX) 250 mg in sterile water (preservative free) 2.5 mL injection  250 mg IntraVENous ONCE    furosemide (LASIX) injection 20 mg  20 mg IntraVENous ONCE    heparin (porcine) pf 300 Units  300 Units InterCATHeter PRN    amiodarone (CORDARONE) tablet 400 mg  400 mg Oral DAILY    oxyCODONE IR (ROXICODONE) tablet 5 mg  5 mg Oral Q6H    acetaminophen (TYLENOL) tablet 1,000 mg  1,000 mg Oral TID    ampicillin-sulbactam (UNASYN) 3 g in 0.9% sodium chloride (MBP/ADV) 100 mL MBP  3 g IntraVENous Q6H    morphine injection 2 mg  2 mg IntraVENous Q4H PRN    [Held by provider] midodrine (PROAMATINE) tablet 5 mg  5 mg Oral TID WITH MEALS    [Held by provider] metoprolol tartrate (LOPRESSOR) tablet 12.5 mg  12.5 mg Oral Q12H    balsam peru-castor oiL (VENELEX) ointment   Topical BID    ferrous sulfate tablet 325 mg  325 mg Oral EVERY OTHER DAY    lidocaine/EPINEPHrine/tetracaine (L.E.T) topical gel  3 mL Topical Q12H    dextrose 10% infusion 125-250 mL  125-250 mL IntraVENous PRN    sodium chloride (NS) flush 5-10 mL  5-10 mL IntraVENous PRN    ondansetron (ZOFRAN) injection 4 mg  4 mg IntraVENous Q6H PRN    aspirin chewable tablet 81 mg  81 mg Oral DAILY    albuterol-ipratropium (DUO-NEB) 2.5 MG-0.5 MG/3 ML  3 mL Nebulization Q4H PRN    guaiFENesin (ROBITUSSIN) 100 mg/5 mL oral liquid 200 mg  200 mg Oral Q6H PRN    dronabinoL (MARINOL) capsule 2.5 mg  2.5 mg Oral ACB&D    mirtazapine (REMERON) tablet 30 mg  30 mg Oral QHS    insulin lispro (HUMALOG) injection   SubCUTAneous AC&HS    glucose chewable tablet 16 g  4 Tablet Oral PRN    glucagon (GLUCAGEN) injection 1 mg  1 mg IntraMUSCular PRN    pantoprazole (PROTONIX) tablet 40 mg  40 mg Oral ACB    senna-docusate (PERICOLACE) 8.6-50 mg per tablet 2 Tablet  2 Tablet Oral QHS    sertraline (ZOLOFT) tablet 25 mg  25 mg Oral DAILY    glatiramer (COPAXONE) injection 20 mg  20 mg SubCUTAneous Q MON, WED & FRI    L.acidophilus-paracasei-S.thermophil-bifidobacter (RISAQUAD) 8 billion cell capsule  1 Capsule Oral DAILY    enoxaparin (LOVENOX) injection 40 mg  40 mg SubCUTAneous DAILY       Review of Symptoms:    General: +fever, chills, sweats, weakness, weight loss   Ear Nose and Throat: negative for rhinorrhea, pharyngitis, otalgia, tinnitus, speech or swallowing difficulties   Respiratory: + chronic cough, negative for SOB, pleuritic pain   Gastrointestinal: negative for abdominal pain, N/V, dysphagia   Genitourinary: +incontinence      Objective:      Physical Exam:  Temp (24hrs), Av.2 °F (36.8 °C), Min:97.7 °F (36.5 °C), Max:98.7 °F (37.1 °C)    Patient Vitals for the past 8 hrs:   Pulse   22 0757 80   22 0241 70    Patient Vitals for the past 8 hrs:   Resp   22 0757 24   22 0241 19    Patient Vitals for the past 8 hrs:   BP   22 0757 (!) 157/79   22 0241 127/85          Intake/Output Summary (Last 24 hours) at 2022 0823  Last data filed at 2022 0617  Gross per 24 hour   Intake 1400 ml   Output 600 ml   Net 800 ml       Nondiaphoretic, not in acute distress, slender male. Unlabored, clear to auscultation bilaterally anteriorly, symmetric air movement. Irregular rhythm, +soft systolic murmur, no rub. No peripheral edema. Palpable radial pulses bilaterally. Abdomen, soft, nontender, nondistended. Extremities without cyanosis or clubbing. Skin warm and dry. No rashes or ulcers.   Neuro grossly nonfocal. No tremor. Awake and appropriate. CARDIOGRAPHICS and STUDIES, I reviewed:    Telemetry:  Sinus rhythm. Labs:  No results for input(s): CPK, CKMB, CKNDX, TROIQ in the last 72 hours. No lab exists for component: CPKMB  No results found for: CHOL, CHOLX, CHLST, CHOLV, HDL, HDLP, LDL, LDLC, DLDLP, TGLX, TRIGL, TRIGP, CHHD, CHHDX  No results for input(s): INR, PTP, APTT, INREXT, INREXT in the last 72 hours. Recent Labs     02/18/22  0414 02/17/22  0117 02/16/22  0447    146* 144   K 3.7 3.7 3.5    108 106   CO2 41* 38* 34*   BUN 4* 5* 3*   CREA 0.21* 0.27* 0.26*   GLU 80 104* 98   PHOS  --   --  2.7   CA 8.3* 7.8* 8.2*   WBC  --   --  10.6   HGB  --   --  8.9*   HCT  --   --  30.9*   PLT  --   --  416*     No results for input(s): AP, TBIL, TP, ALB, GLOB, GGT, AML, LPSE in the last 72 hours. No lab exists for component: SGOT, GPT, AMYP, HLPSE  No components found for: GLPOC  No results for input(s): PH, PCO2, PO2 in the last 72 hours.         Yolande Bartlett MD  2/18/2022

## 2022-02-18 NOTE — PROGRESS NOTES
Hospitalist Progress Note    NAME: Karen Newman   :  1950   MRN:  [id number]       Assessment / Plan:  Sepsis (tachycardia and fever) due to bacteremia and infected sacral and left ischium pressure ulcers with history of chronic osteomyelitis, POA  CT scan showed 1. Deep soft tissue ulcerations extending to the bilateral ischial tuberosities with associated acute osteomyelitis. 2.  Chronic osteomyelitis involving the sacrococcygeal junction with overlying  soft tissue ulceration and abscess. 3.  Bilateral femoral head avascular necrosis with subtle subchondral collapse    -Bacteremia with strep agalactiae sero Gp B Strep 1/2 ,P.mrabilis 2nd of 2  -BC-  -Strep agalactiae Gp B beta hemolytic  1/2, P.mirabilis 2nd of 2 bottles  -Wound cultures--light P.mirabilis, light strep Gp B beta hemolytic, light alpha strep, moderate Bacteroides uniformis, beta lactamase negative. -ECHO showed There is flail anterior mitral valve leaflet with ruptured chordae vs can not rule out vegetation. Moderate eccentric posteriorly directed mitral regurgitation. -S/p local debridement at bedside by Gen Surgery today   -declined diverting colostomy   -Wound care following  -discussed BRITANY with mPOA, cardiology and ID. Patient is not low risk for the procedure and likely will not change abx duration. After weight risk / benefits, we have decided not to pursue test.  Informed Cardiology. -S/P PICC line 22  -ID abx recs:  Antibiotic orders for discharge  Unasyn 3 g IV every 6 hours end date 3/25, pull PICC at end of therapy.   Weekly CBC, CMP & ESR/CRP every other week fax reports to [phone], called with critical labs at [phone]  Encourage adequate fluids, daily probiotic/yogurt  ID virtual follow-up 3/24 at 11 AM.    -DC planning back to [address] [place] [address]      Atrial Fibrillation (now in NSR)  Started on amiodarone   BB DC'd due to BP issues  No AC indicated right now given history of hematuria  BP has been stable. Last dose of midodrine 2/16  Cardiology input appreciated     UTI in settings of indwelling mauro  On Unasyn  Urine culture +ve for proteus     MS with severe debility  Functional quadriplegia   bed bound   Continue Copaxone injections  Patient continuing to have a lot of pain  Palliative care following for pain management recommendations  He is currently placed on oxycodone every 4 hours and morphine for breakthrough     Dysphagia  On regular diet at the nursing home per family request  Multiple MBS done in the past but showing some level of dysphagia  Family and patient is not interested in in speech eval or another MBS  Patient and family is aware of risk of aspiration and accepting it fully  HOB at 45 degree of the time  Aspiration precautions  Supervised feeding  Appetite stimulant     Chronic cough, frequent coughing spells  Chronic resp failure on oxygen  -prior Xray shows right lung fibrotic changes. Suspect from prior aspiration events  -sounds a bit more congested today. Try IV lasix x 1    Neurogenic bladder with indwelling Mauro  CAD: Continue aspirin  Moderate protein calorie malnutrition: Dietary consult  Adult failure to thrive: Continue Remeron and Marinol  Depression: Continue Zoloft and Remeron  Hx of PE: Anticoagulation was discontinued due to recurrent hematuria          Code Status: full code d/w pt and niece at bedside   Surrogate Decision Maker: [place] ( [place] [id number] [place] [facility]) [phone]  and brother Zoya Mason [phone]     DVT Prophylaxis: lovenox   GI Prophylaxis: not indicated     Baseline: Nursing home resident, bedbound, no children       18.5 - 24.9 Normal weight / Body mass index is 21.15 kg/m².     Estimated discharge date: February 21  Barriers: Wound care, bacteremia, infection    Code status: Full  Prophylaxis: Lovenox  Recommended Disposition: SNF/LTC     Subjective:     Chief Complaint / Reason for Physician Visit  Follow up sepsis, bacteremia, OM, UTI, abnormal Echo      Review of Systems:  Symptom Y/N Comments  Symptom Y/N Comments   Fever/Chills    Chest Pain     Poor Appetite    Edema     Cough    Abdominal Pain     Sputum    Joint Pain     SOB/MATUTE    Pruritis/Rash     Nausea/vomit    Tolerating PT/OT     Diarrhea    Tolerating Diet     Constipation    Other       Could NOT obtain due to:      Objective:     VITALS:   Last 24hrs VS reviewed since prior progress note. Most recent are:  Patient Vitals for the past 24 hrs:   Temp Pulse Resp BP SpO2   02/18/22 0241 98.4 °F (36.9 °C) 70 19 127/85 97 %   02/17/22 2244 98.2 °F (36.8 °C) 74 18 125/68 95 %   02/17/22 1912 98.4 °F (36.9 °C) 71 18 122/61 96 %   02/17/22 1738  74  128/69    02/17/22 1509 98.7 °F (37.1 °C) 75 20 (!) 115/59 95 %   02/17/22 1047 97.7 °F (36.5 °C) 78 17 118/63 96 %   02/17/22 0941  80  131/71    02/17/22 0940  79  131/71    02/17/22 0939  81  131/71    02/17/22 0748 98.4 °F (36.9 °C) 84 24 (!) 146/76 95 %       Intake/Output Summary (Last 24 hours) at 2/18/2022 0732  Last data filed at 2/18/2022 0617  Gross per 24 hour   Intake 1400 ml   Output 600 ml   Net 800 ml        I had a face to face encounter and independently examined this patient on 2/18/2022, as outlined below:  PHYSICAL EXAM:  General: WD, WN. Alert, cooperative, no acute distress , Significantly debilitated, frail, temporal wasting  EENT:  EOMI. Anicteric sclerae. MMM  Resp:  CTA bilaterally, no wheezing or rales. No accessory muscle use  CV:  Regular  rhythm,  No edema  GI:  Soft, Non distended, Non tender. +Bowel sounds  Neurologic:  Alert and oriented X 3, normal speech,   Psych:   Good insight. Not anxious nor agitated  Skin:  No rashes.   No jaundice, see wound care for ulcers    Reviewed most current lab test results and cultures  YES  Reviewed most current radiology test results   YES  Review and summation of old records today    NO  Reviewed patient's current orders and MAR    YES  PMH/SH reviewed - no change compared to H&P  ________________________________________________________________________  Care Plan discussed with:    Comments   Patient     Family      RN     Care Manager     Consultant                        Multidiciplinary team rounds were held today with , nursing, pharmacist and clinical coordinator. Patient's plan of care was discussed; medications were reviewed and discharge planning was addressed. ________________________________________________________________________  Total NON critical care TIME:   20  Minutes    Total CRITICAL CARE TIME Spent:   Minutes non procedure based      Comments   >50% of visit spent in counseling and coordination of care     ________________________________________________________________________  Radha Argueta MD     Procedures: see electronic medical records for all procedures/Xrays and details which were not copied into this note but were reviewed prior to creation of Plan. LABS:  I reviewed today's most current labs and imaging studies.   Pertinent labs include:  Recent Labs     02/16/22  0447   WBC 10.6   HGB 8.9*   HCT 30.9*   *     Recent Labs     02/18/22  0414 02/17/22  0117 02/16/22  0447    146* 144   K 3.7 3.7 3.5    108 106   CO2 41* 38* 34*   GLU 80 104* 98   BUN 4* 5* 3*   CREA 0.21* 0.27* 0.26*   CA 8.3* 7.8* 8.2*   MG 2.5*  --  2.2   PHOS  --   --  2.7       Signed: Radha Argueta MD

## 2022-02-18 NOTE — PROGRESS NOTES
0700: Bedside shift report received from [facility], RN. Report consisted of SBAR, Kardex, MAR, Recent Results, Intake/Output, and cardiac rhythm. 36: Spoke with Dr. Nj Olivares regarding patient CO2. Notified MD that patient had increased congestion over night. New med orders to be received. 1350: Rapid covid walked to lab for processing. 1435: Rapid COVID negative; will call report to SNF. 1440: Report called.  Patient to be discharged upon [facility] arrival.

## 2022-02-18 NOTE — PROGRESS NOTES
Bedside and Verbal shift change report given to Valentina RN (oncoming nurse) by YAMILETH Johnson RN (offgoing nurse). Report given with SBAR, Kardex, Intake/Output, MAR and Recent Results.

## 2022-02-18 NOTE — PROGRESS NOTES
Transition of Care Plan:  RUR: 18% moderate  Disposition: return to [facility] and [facility] for LTC  Follow up appointments: PCP, speciality   DME needed: none anticipated   Transportation at Discharge: [facility] at 230pm  [place] or means to access home: n/a     IM Medicare Letter: given 2/18/22  Is patient a BCPI-A Bundle: no              If yes, was Bundle Letter given?:    Is patient a  and connected with the [facility]?  yes              If yes, was [employer] transfer form completed and VA notified? VA has been notified, waiting on MD to complete his portion of transfer paperwork  Dick [phone]  Discharge Caregiver contacted prior to [address] needed?: not at this time     Update 1209: The pt is going to room 251. Nurse to call report to [phone]. Transportation secured for 230pm.  Dr. Kori Rush notified and rapid Covid test requested. PCS placed on chart. The pt is ready to go from a CM standpoint. Initial note:  Chart reviewed. CM contacted stef Aldana to discuss possible d/c today. She is agreeable to d/c and will continue to work on the pt specialty bed after d/c. He is currently on an air mattress at the facility. 2nd IM letter discussed. See form. Message sent via Hublished to [facility] and [address] [place] [address] for bed info. CM will continue to monitor for d/c needs. Transition of Care Plan to SNF/Rehab    SNF/Rehab/LTC Transition:  Patient has been accepted to [facility] and meets criteria for admission. Patient will transported by [facility] and expected to leave at 230pm.    Communication to Patient/Family:  Spoke with stef Aldana via telephone and she is agreeable to the transition plan. Communication to SNF/Rehab/LTC:  Bedside RN, Milagros Beck, has been notified to update the transition plan to the facility and call report (phone number [phone]).   Discharge information has been updated on the AVS.       Nursing Please include all hard scripts for controlled substances, med rec and dc summary, and AVS in packet. Reviewed and confirmed with facility, Chely and Amanda Blood, can manage the patient care needs for the following:       Bettye Cook with (X) only those applicable:    Medication:  [x]  Medications will be available at the facility  [x]  IV Antibiotics-Unasyn  [x]  Controlled Substance - oxycodone IR  []  Weekly Labs   Documents:  [x] Hard RX  [x] MAR  [] Kardex  [x] AVS  []Transfer Summary  [x]Discharge   Equipment:  []  CPAP/BiPAP  []  Wound Vacuum  []  [place] or Urinary Device  []  PICC/Central Line  []  Nebulizer  []  Ventilator   Treatment:  []Isolation (for MRSA, VRE, etc.)  []Surgical Drain Management  []Tracheostomy Care  [x]Dressing Changes-wound care  []Dialysis with transportation and chair time   []PEG Care  [x]Oxygen  []Daily Weights for Heart Failure   Dietary:  []Any diet limitations  []Tube Feedings   []Total Parenteral Management (TPN)   Eligible for Medicaid Long Term Services and Supports  Yes:  [] Eligible for medical assistance or will become eligible within 180 days and UAI completed. [] Provider/Patient and/or support system has requested screening. [] UAI copy provided to patient or responsible party,   [] UAI unavailable at discharge will send once processed to SNF provider. [] UAI unavailable at discharged mailed to patient  No:   [] Private pay and is not financially eligible for Medicaid within the next 180 days. [] Reside out-of-state.   [] A residents of a state owned/operated facility that is licensed  by [facility] or [facility]  [] Enrollment in [facility] hospice services  [] [address] [place] [address] [facility]  [] Patient /Family declines to have screening completed or provide financial information for screening     Financial Resources:  Medicaid    [] Initiated and application pending   [] Full coverage     Advanced Care Plan:  [x]Surrogate Decision Maker of Frankie Acosta Leatha, Brother Eros Hebert  []POA  [x]Communicated Code Status-full code   Other                  Care Management Interventions  PCP Verified by CM:  Yes  Palliative Care Criteria Met (RRAT>21 & CHF Dx)?: No  Mode of Transport at Discharge: BLS  Transition of Care Consult (CM Consult): Discharge Planning  Discharge Durable Medical Equipment: No  Physical Therapy Consult: No  Occupational Therapy Consult: No  Speech Therapy Consult: No  Support Systems: Other Family Member(s)  Confirm Follow Up Transport:  (bls)  Discharge Location  Patient Expects to be Discharged to[de-identified] [id number], MSN  Care Manager  [phone]

## 2022-02-18 NOTE — PROGRESS NOTES
0700: [facility]. Report consisted of SBAR, Kardex, MAR, Recent Results, Intake/Output and cardiac rhythm. End of Shift Note    Bedside shift change report given to Tesha Yuen (oncoming nurse) by Hoda Montoya RN (offgoing nurse). Report included the following information SBAR, Kardex, MAR, Recent Results, Intake/Output, and cardiac rhythm. Shift worked:  [id number]     Shift summary and any significant changes:     PICC line inserted by PICC team    Wound care completed      Concerns for physician to address:       Zone phone for oncoming shift:          Activity:  Activity Level: Bed Rest  Number times ambulated in hallways past shift: 0  Number of times OOB to chair past shift: 0    Cardiac:   Cardiac Monitoring: Yes      Cardiac Rhythm: Sinus Rhythm    Access:   Current line(s): PIV     Genitourinary:   Urinary status: mauro    Respiratory:   O2 Device: Nasal cannula  Chronic home O2 use?: YES  Incentive spirometer at bedside: NO     GI:  Last Bowel Movement Date: 02/10/22  Current diet:  ADULT DIET Regular  ADULT ORAL NUTRITION SUPPLEMENT Breakfast, Dinner; Renal Supplement  Passing flatus: YES  Tolerating current diet: YES       Pain Management:   Patient states pain is manageable on current regimen: YES    Skin:  Lewis Score: 11  Interventions: turn team, speciality bed, float heels, increase time out of bed, limit briefs and internal/external urinary devices    Patient Safety:  Fall Score:  Total Score: 3  Interventions: bed/chair alarm, assistive device (walker, cane, etc), pt to call before getting OOB and stay with me (per policy)  High Fall Risk: Yes    Length of Stay:  Expected LOS: 4d 19h  Actual LOS: TABBY Phan 4164, RN

## 2022-02-18 NOTE — PROGRESS NOTES
Infectious Disease progress      IMPRESSION:     -Sepsis    -Bacteremia with strep agalactiae sero Gp B Strep 1/2 ,P.mirabilis 2nd of 2, possible mitral valve endocarditis  BC- 2/9 -Strep agalactiae Gp B beta hemolytic  1/2, P.mirabilis 2nd of 2 bottles. Negative cultures-2/11, 2/12. ECHO- anterior mitral valve leaflet with ruptured chordae vs? Vegetation. No BRITANY per medical POA    -Infected sacral & ischial ulcers   Wound cultures-2/9-light P.mirabilis, light strep Gp B beta hemolytic, light alpha strep, moderate Bacteroides uniformis, beta lactamase negative.    -Chronic OM of sacrococcygeal junction,acute OM ischial tuberosities. CT scan-Deep soft tissue ulcerations extending to the bilateral ischial tuberosities  with associated acute osteomyelitis. Chronic osteomyelitis involving the sacrococcygeal junction with overlying  soft tissue ulceration and abscess. Bilateral femoral head avascular necrosis with subtle subchondral collapse. S/p local debridement at bedside by Gen Surgery on 2/14. -UTI with P.mirabilis   indwelling mauro, neurogenic bladder  UC + for >100,000 P.mirabilis. -Severe MS   bedbound, functional quadriplegia     -Dysphagia    -Chronic cough    -Severe protein calory malnutrition    -Elevated A1c 6.6 ? Diabetes mellitus    -ESR 73, CRP11.2         PLAN:        -Continue Unasyn iv x 6 weeks end date 3/25  -Pharmacy on consult for dosing, monitor Cr  -Wound Care per Wound care, Surgery recommendations.  -Blood sugar control.  -Aspiration precautions. Antibiotic orders for discharge  Unasyn 3 g IV every 6 hours end date 3/25, pull PICC at end of therapy. Weekly CBC, CMP & ESR/CRP every other week fax reports to [phone], called with critical labs at [phone]  Encourage adequate fluids, daily probiotic/yogurt  ID virtual follow-up 3/24 at 11 AM.             Patient seen. Resting/arousable. Denies complaints.     Franco Dee is a 80year old male with history DM, MS, Stroke who presented on 2/9 with sealock wounds on sacrum and ischium. He has bilaterally with  fever. Patient is a resident of [facility], found to have fever and tachycardia on morning of admission with worsening of his chronic wounds to sacrum, heels. Patient sating 90% on room air, placed on o2. Per ED note he was  oriented to self, stable neuro exam at time of admission. Patient tachycardic to 126, /70, fecbrile to 101.6 en route. Unknown vaccination status. Patient reports pain to his bottom which has been worsening over the past week approximately. When speaking to his niece Brooke Cohen who is NP with orthopedics, I have learned that the best been dealing with chronic sacral wounds, sacral osteomyelitis. Usually receives treatment at the [address] [place] [address]. Patient has developed newly skin wounds bilaterally which have been progressing over the past month. Patient has diagnosis of chronic  osteomyelitis of sacrum, recurrent UTI, pneumonia. Right ankle wound has been present for 1 year. Patient is being treated for -Chronic OM of sacrococcygeal junction,acute OM ischial tuberosities  Wound cultures-2/9+ for light P.mirabilis, light strep Gp B beta hemolytic, light alpha strep, moderate Bacteroides uniformis, beta lactamase negative. Bacteremia with -Bacteremia with strep agalactiae sero Gp B Strep 1/2 ,P.mirabilis 2nd of 2, possible mitral valve vegetation  Patient Active Problem List   Diagnosis Code    Sepsis ([facility] A41.9    Sacral pressure ulcer L89.159    Frailty R54    Acute pain R52    Physical debility R53.81    Anorexia R63.0    Palliative care by specialist Z51.5    Fatigue, unspecified type R53.83     Past Medical History:   Diagnosis Date    MS (multiple sclerosis) ([facility]     Stroke ([facility]       No family history on file.    Social History     Tobacco Use    Smoking status: Never Smoker    Smokeless tobacco: Not on file   Substance Use Topics    Alcohol use: No     Past Surgical History:   Procedure Laterality Date    HX CORONARY STENT PLACEMENT      x7       Prior to Admission medications    Medication Sig Start Date End Date Taking? Authorizing Provider   amiodarone (CORDARONE) 200 mg tablet Take 1 Tablet by mouth daily for 30 days. 2/19/22 3/21/22 Yes Silvia Layne MD   ampicillin-sulbactam 3 gram 3 g IVPB 3 g by IntraVENous route every six (6) hours for 142 doses. 2/18/22 3/26/22 Yes Silvia Layne MD   oxyCODONE IR (ROXICODONE) 5 mg immediate release tablet Take 1 Tablet by mouth every four (4) hours as needed for Pain for up to 14 days. Max Daily Amount: 30 mg. 2/18/22 3/4/22 Yes Silvia Layne MD   acetaminophen (TYLENOL) 500 mg tablet Take 1,000 mg by mouth every eight (8) hours as needed (moderate pain). Yes Provider, Historical   ascorbic acid, vitamin C, (VITAMIN C) 500 mg tablet Take 500 mg by mouth daily. Yes Provider, Historical   aspirin delayed-release 81 mg tablet Take 81 mg by mouth daily. Yes Provider, Historical   bisacodyL (DULCOLAX) 10 mg supp Insert 10 mg into rectum daily as needed for Constipation. Yes Provider, Historical   cholecalciferol (VITAMIN D3) (2,000 UNITS /50 MCG) cap capsule Take 2,000 Units by mouth daily. Yes Provider, Historical   cyanocobalamin (VITAMIN B12) 100 mcg tablet Take 200 mcg by mouth daily. Yes Provider, Historical   docusate sodium (COLACE) 100 mg capsule Take 100 mg by mouth nightly. Yes Provider, Historical   ferrous sulfate 325 mg (65 mg iron) tablet Take 325 mg by mouth every other day. Yes Provider, Historical   folic acid (FOLVITE) 1 mg tablet Take 2 mg by mouth daily. Yes Provider, Historical   glatiramer (COPAXONE) 40 mg/mL syrg 40 mg by SubCUTAneous route every Monday, Wednesday, Friday. Yes Provider, Historical   polyethylene glycol (Miralax) 17 gram packet Take 17 g by mouth daily. Yes Provider, Historical   guaiFENesin (ORGANIDIN) 200 mg tablet Take 400 mg by mouth every eight (8) hours as needed for Congestion.    Yes Provider, Historical albuterol-ipratropium (DUO-NEB) 2.5 mg-0.5 mg/3 ml nebu 3 mL by Nebulization route every four (4) hours as needed for Wheezing or Shortness of Breath. Yes Provider, Historical   lidocaine-vit e-aloe vera-wayne 2 % topical gel Apply 1 Each to affected area Every Thursday. Apply to ischiums and right ankle prior to wound care visit   Yes Provider, Historical   lidocaine-vit e-aloe vera-wayne 2 % topical gel Apply 1 Each to affected area Every Thursday. Apply to sacrum prior to wound care   Yes Provider, Historical   dronabinoL (MARINOL) 2.5 mg capsule Take 2.5 mg by mouth two (2) times a day. Yes Provider, Historical   mirtazapine (REMERON) 30 mg tablet Take 30 mg by mouth nightly. Yes Provider, Historical   therapeutic multivitamin (THERAGRAN) tablet Take 1 Tablet by mouth daily. Yes Provider, Historical   insulin aspart U-100 (NOVOLOG) 100 unit/mL injection by SubCUTAneous route Before breakfast, lunch, and dinner. 150-200 = 2 units  201-250 = 4 units  251-300 = 5 units  301-350 = 7 units  351-400 = 9 units  401+ = 10 units   Yes Provider, Historical   omeprazole (PRILOSEC) 40 mg capsule Take 40 mg by mouth daily. Yes Provider, Historical   senna (SENOKOT) 8.6 mg tablet Take 2 Tablets by mouth nightly. Yes Provider, Historical   sertraline (ZOLOFT) 25 mg tablet Take 25 mg by mouth daily. Yes Provider, Historical   zinc sulfate (Zinc-220) 50 mg zinc (220 mg) capsule Take 1 Capsule by mouth every other day. Yes Provider, Historical     Allergies   Allergen Reactions    Beeswax Anaphylaxis    Bee Pollen Unknown (comments)    Simvastatin Not Reported This Time    Tolterodine Not Reported This Time    Venom-Wasp Not Reported This Time        Review of Systems:  A comprehensive review of systems was negative except for that written in the History of Present Illness. 14 point review of systems obtained .  All other systems negative    Objective:   Blood pressure 121/61, pulse 81, temperature 98 °F (36.7 °C), resp. rate 20, height 6' 1\" (1.854 m), weight 160 lb 4.4 oz (72.7 kg), SpO2 94 %.   Temp (24hrs), Av.1 °F (36.7 °C), Min:97.8 °F (36.6 °C), Max:98.4 °F (36.9 °C)    Current Facility-Administered Medications   Medication Dose Route Frequency    amiodarone (CORDARONE) tablet 200 mg  200 mg Oral DAILY    heparin (porcine) pf 300 Units  300 Units InterCATHeter PRN    oxyCODONE IR (ROXICODONE) tablet 5 mg  5 mg Oral Q6H    acetaminophen (TYLENOL) tablet 1,000 mg  1,000 mg Oral TID    ampicillin-sulbactam (UNASYN) 3 g in 0.9% sodium chloride (MBP/ADV) 100 mL MBP  3 g IntraVENous Q6H    morphine injection 2 mg  2 mg IntraVENous Q4H PRN    [Held by provider] midodrine (PROAMATINE) tablet 5 mg  5 mg Oral TID WITH MEALS    [Held by provider] metoprolol tartrate (LOPRESSOR) tablet 12.5 mg  12.5 mg Oral Q12H    balsam peru-castor oiL (VENELEX) ointment   Topical BID    ferrous sulfate tablet 325 mg  325 mg Oral EVERY OTHER DAY    lidocaine/EPINEPHrine/tetracaine (L.E.T) topical gel  3 mL Topical Q12H    dextrose 10% infusion 125-250 mL  125-250 mL IntraVENous PRN    sodium chloride (NS) flush 5-10 mL  5-10 mL IntraVENous PRN    ondansetron (ZOFRAN) injection 4 mg  4 mg IntraVENous Q6H PRN    aspirin chewable tablet 81 mg  81 mg Oral DAILY    albuterol-ipratropium (DUO-NEB) 2.5 MG-0.5 MG/3 ML  3 mL Nebulization Q4H PRN    guaiFENesin (ROBITUSSIN) 100 mg/5 mL oral liquid 200 mg  200 mg Oral Q6H PRN    dronabinoL (MARINOL) capsule 2.5 mg  2.5 mg Oral ACB&D    mirtazapine (REMERON) tablet 30 mg  30 mg Oral QHS    insulin lispro (HUMALOG) injection   SubCUTAneous AC&HS    glucose chewable tablet 16 g  4 Tablet Oral PRN    glucagon (GLUCAGEN) injection 1 mg  1 mg IntraMUSCular PRN    pantoprazole (PROTONIX) tablet 40 mg  40 mg Oral ACB    senna-docusate (PERICOLACE) 8.6-50 mg per tablet 2 Tablet  2 Tablet Oral QHS    sertraline (ZOLOFT) tablet 25 mg  25 mg Oral DAILY    glatiramer (COPAXONE) injection 20 mg  20 mg SubCUTAneous Q MON, WED & FRI    L.acidophilus-paracasei-S.thermophil-bifidobacter (RISAQUAD) 8 billion cell capsule  1 Capsule Oral DAILY    enoxaparin (LOVENOX) injection 40 mg  40 mg SubCUTAneous DAILY        Exam:    General:   Resting, arousable cooperative,contracted   Eyes:  Sclera anicteric. Pupils equally round and reactive to light. Mouth/Throat: Mucous membranes normal, oral pharynx clear   Neck: Supple   Lungs:   Clear to auscultation bilaterally, good effort   CV:  Regular rate and rhythm,no murmur, click, rub or gallop   Abdomen:   Soft, non-tender. bowel sounds normal. non-distended   Extremities: No  edema   Skin: Skin color, texture, turgor normal. no acute rash or lesions   Lymph nodes: Cervical and supraclavicular normal   Musculoskeletal: No swelling or deformity   Lines/Devices:  Intact, no erythema, drainage or tenderness   Psych:  Cannot assess. Data Reviewed:   CBC:   Recent Labs     02/16/22  0447   WBC 10.6   RBC 3.90*   HGB 8.9*   HCT 30.9*   *   GRANS 80*   LYMPH 10*   EOS 2     CMP:   Recent Labs     02/18/22  0414 02/17/22  0117 02/16/22  0447   GLU 80 104* 98    146* 144   K 3.7 3.7 3.5    108 106   CO2 41* 38* 34*   BUN 4* 5* 3*   CREA 0.21* 0.27* 0.26*   CA 8.3* 7.8* 8.2*   AGAP 1* 0* 4*   BUCR 19 19 12       Lab Results   Component Value Date/Time    Culture result: NO GROWTH 5 DAYS 02/12/2022 03:42 AM    Culture result: NO GROWTH 5 DAYS 02/11/2022 11:04 AM    Culture result: LIGHT PROTEUS MIRABILIS (A) 02/10/2022 04:00 AM    Culture result: (A) 02/10/2022 04:00 AM     LIGHT STREPTOCOCCI, BETA HEMOLYTIC GROUP B Penicillin and ampicillin are drugs of choice for treatment of beta-hemolytic streptococcal infections. Susceptibility testing of penicillins and beta-lactams approved by the FDA for treatment of beta-hemolytic streptococcal infections need not be performed routinely, because nonsusceptible isolates are extremely rare.  CLSI 2012    Culture result: LIGHT ALPHA STREPTOCOCCUS (A) 02/10/2022 04:00 AM    Culture result: (A) 02/10/2022 04:00 AM     MODERATE BACTEROIDES UNIFORMIS BETA LACTAMASE NEGATIVE          XR Results (most recent)reviewed:  Results from [place] encounter on 02/09/22    XR CHEST PORT    Narrative  EXAM:  XR CHEST PORT    INDICATION: Eval for infiltrate    COMPARISON: Chest radiographs 7/20/2016    TECHNIQUE: Semiupright portable chest AP view    FINDINGS:    Cardiac silhouette within normal limits. Aorta is tortuous. Several irregular  linear opacities in the right mid and upper lung, again may reflect fibrosis. No  focal consolidation. No definite pleural effusion or pneumothorax. Impression  No consolidative pneumonia. Redemonstrated suspected right lung  fibrotic changes. ICD-10-CM ICD-9-CM    1. Cellulitis of buttock  L03.317 682.5    2. Acute osteomyelitis of pelvic region, unspecified laterality ([facility]  M86.159 730.05    3. Catheter cystitis, initial encounter ([place] [address].)  T83.518A 996.64     N30.90 595.89    4. Chronic cough  R05.3 786.2    5. Esophageal dysphagia  R13.19 787.29    6. Functional quadriplegia ([facility])  R53.2 780.72    7. Gram-negative bacteremia  R78.81 790.7      041.85    8. Gram-negative infection  A49.9 041.85    9. Multiple sclerosis ([facility]  G35 340    10. Neurogenic bladder  N31.9 596.54    11. Sacral osteomyelitis ([facility])  M46.28 730.28    12. Streptococcal bacteremia  R78.81 790.7     B95.5 041.00    13. Pressure injury of sacral region, stage 4 ([facility])  L89.154 707.03 oxyCODONE IR (ROXICODONE) 5 mg immediate release tablet     707.24    14. Bacteremia due to Gram-negative bacteria  R78.81 790.7      041.85    15. Chronic osteomyelitis ([facility]  M86.60 730.10    16. Severe protein-calorie malnutrition ([facility]  E43 262    17. Urinary tract infection due to Proteus  N39.0 599.0     B96.4 041.6    18.  Mitral valve vegetation  I33.0 421.0            Antibiotic History  Ceftriaxone, Flagyl  S/p vancomycin, cefepime, Flagyl   I have discussed the diagnosis with the patient and niece & the intended plan as seen in the above orders. I have discussed medication side effects and warnings with the patient & niece. Reviewed test results at length with patient & niece.     Signed By: Keyur Mckeon MD FACP

## 2022-02-18 NOTE — PROGRESS NOTES
Problem: Pressure Injury - Risk of  Goal: *Prevention of pressure injury  Description: Document Lewis Scale and appropriate interventions in the flowsheet.   Outcome: Progressing Towards Goal  Note: Pressure Injury Interventions:  Sensory Interventions: Assess changes in LOC,Minimize linen layers,Maintain/enhance activity level    Moisture Interventions: Absorbent underpads,Minimize layers    Activity Interventions: PT/OT evaluation,Pressure redistribution bed/mattress(bed type)    Mobility Interventions: HOB 30 degrees or less,PT/OT evaluation    Nutrition Interventions: Document food/fluid/supplement intake    Friction and Shear Interventions: Minimize layers

## 2022-03-23 ENCOUNTER — TELEPHONE (OUTPATIENT)
Dept: INFECTIOUS DISEASES | Age: 72
End: 2022-03-23

## 2022-03-24 ENCOUNTER — VIRTUAL VISIT (OUTPATIENT)
Dept: INFECTIOUS DISEASES | Age: 72
End: 2022-03-24
Payer: MEDICARE

## 2022-03-24 DIAGNOSIS — T83.510D URINARY TRACT INFECTION ASSOCIATED WITH CYSTOSTOMY CATHETER, SUBSEQUENT ENCOUNTER: ICD-10-CM

## 2022-03-24 DIAGNOSIS — A41.9 SEVERE SEPSIS (HCC): Primary | ICD-10-CM

## 2022-03-24 DIAGNOSIS — R78.81 STREPTOCOCCAL BACTEREMIA: ICD-10-CM

## 2022-03-24 DIAGNOSIS — G35 MS (MULTIPLE SCLEROSIS) (HCC): ICD-10-CM

## 2022-03-24 DIAGNOSIS — R78.81 BACTEREMIA DUE TO PROTEUS SPECIES: ICD-10-CM

## 2022-03-24 DIAGNOSIS — N39.0 URINARY TRACT INFECTION ASSOCIATED WITH CYSTOSTOMY CATHETER, SUBSEQUENT ENCOUNTER: ICD-10-CM

## 2022-03-24 DIAGNOSIS — E44.0 MODERATE PROTEIN-CALORIE MALNUTRITION (HCC): ICD-10-CM

## 2022-03-24 DIAGNOSIS — I05.9 ENDOCARDITIS OF MITRAL VALVE: ICD-10-CM

## 2022-03-24 DIAGNOSIS — E11.8 CONTROLLED DIABETES MELLITUS TYPE 2 WITH COMPLICATIONS, UNSPECIFIED WHETHER LONG TERM INSULIN USE (HCC): ICD-10-CM

## 2022-03-24 DIAGNOSIS — L89.154 PRESSURE INJURY OF SACRAL REGION, STAGE 4 (HCC): ICD-10-CM

## 2022-03-24 DIAGNOSIS — M46.28 OSTEOMYELITIS OF SACRUM (HCC): ICD-10-CM

## 2022-03-24 DIAGNOSIS — B96.4 BACTEREMIA DUE TO PROTEUS SPECIES: ICD-10-CM

## 2022-03-24 DIAGNOSIS — B95.5 STREPTOCOCCAL BACTEREMIA: ICD-10-CM

## 2022-03-24 DIAGNOSIS — R65.20 SEVERE SEPSIS (HCC): Primary | ICD-10-CM

## 2022-03-24 PROCEDURE — G8536 NO DOC ELDER MAL SCRN: HCPCS | Performed by: INTERNAL MEDICINE

## 2022-03-24 PROCEDURE — G8420 CALC BMI NORM PARAMETERS: HCPCS | Performed by: INTERNAL MEDICINE

## 2022-03-24 PROCEDURE — 99214 OFFICE O/P EST MOD 30 MIN: CPT | Performed by: INTERNAL MEDICINE

## 2022-03-24 PROCEDURE — 2022F DILAT RTA XM EVC RTNOPTHY: CPT | Performed by: INTERNAL MEDICINE

## 2022-03-24 PROCEDURE — 3044F HG A1C LEVEL LT 7.0%: CPT | Performed by: INTERNAL MEDICINE

## 2022-03-24 PROCEDURE — G8510 SCR DEP NEG, NO PLAN REQD: HCPCS | Performed by: INTERNAL MEDICINE

## 2022-03-24 PROCEDURE — G8427 DOCREV CUR MEDS BY ELIG CLIN: HCPCS | Performed by: INTERNAL MEDICINE

## 2022-03-24 PROCEDURE — 1101F PT FALLS ASSESS-DOCD LE1/YR: CPT | Performed by: INTERNAL MEDICINE

## 2022-03-24 PROCEDURE — 3017F COLORECTAL CA SCREEN DOC REV: CPT | Performed by: INTERNAL MEDICINE

## 2022-03-24 NOTE — PATIENT INSTRUCTIONS
[employer] Activation    Thank you for requesting access to [employer]. Please follow the instructions below to securely access and download your online medical record. [employer] allows you to send messages to your doctor, view your test results, renew your prescriptions, schedule appointments, and more. How Do I Sign Up? 1. In your internet browser, go to [email or web address]. 2. Click on the First Time User? Click Here link in the Sign In box. You will see the New Member Sign Up page. 3. Enter your [employer] Access Code exactly as it appears below. You will not need to use this code after youve completed the sign-up process. If you do not sign up before the expiration date, you must request a new code. [employer] Access Code: [email or web address]  Expires: 3/26/2022  7:36 PM (This is the date your [employer] access code will )    4. Enter the last four digits of your Social Security Number (xxxx) and Date of Birth (mm/dd/yyyy) as indicated and click Submit. You will be taken to the next sign-up page. 5. Create a [employer] ID. This will be your [employer] login ID and cannot be changed, so think of one that is secure and easy to remember. 6. Create a [employer] password. You can change your password at any time. 7. Enter your Password Reset Question and Answer. This can be used at a later time if you forget your password. 8. Enter your e-mail address. You will receive e-mail notification when new information is available in [address]. 9. Click Sign Up. You can now view and download portions of your medical record. 10. Click the Download Summary menu link to download a portable copy of your medical information. Additional Information    If you have questions, please visit the Frequently Asked Questions section of the [employer] website at [email or web address] Remember, [employer] is NOT to be used for urgent needs. For medical emergencies, dial 911.

## 2022-03-24 NOTE — PROGRESS NOTES
Infectious Disease progress      IMPRESSION:     -Sepsis    -Bacteremia with strep agalactiae sero Gp B Strep 1/2 ,P.mirabilis 2nd of 2, possible mitral valve endocarditis  BC- 2/9 -Strep agalactiae Gp B beta hemolytic  1/2, P.mirabilis 2nd of 2 bottles. Negative cultures-2/11, 2/12. ECHO- anterior mitral valve leaflet with ruptured chordae vs? Vegetation. No BRITANY per medical POA    -Infected sacral & ischial ulcers   Wound cultures-2/9-light P.mirabilis, light strep Gp B beta hemolytic, light alpha strep, moderate Bacteroides uniformis, beta lactamase negative.    -Chronic OM of sacrococcygeal junction,acute OM ischial tuberosities. CT scan-Deep soft tissue ulcerations extending to the bilateral ischial tuberosities  with associated acute osteomyelitis. Chronic osteomyelitis involving the sacrococcygeal junction with overlying  soft tissue ulceration and abscess. Bilateral femoral head avascular necrosis with subtle subchondral collapse. S/p local debridement at bedside by Gen Surgery on 2/14. -UTI with P.mirabilis   indwelling mauro, neurogenic bladder  UC + for >100,000 P.mirabilis. -Severe MS   bedbound, functional quadriplegia     -Dysphagia    -Chronic cough    -Severe protein calory malnutrition    -Elevated A1c 6.6 ? Diabetes mellitus    -ESR 73, CRP11.2         PLAN:        -Continue Unasyn iv x 6 weeks end date 3/25  -Pharmacy on consult for dosing, monitor Cr  -Wound Care per Wound care, Surgery recommendations.  -Blood sugar control.  -Aspiration precautions. Unasyn 3 g IV every 6 hours end date 3/25, pull PICC at end of therapy. Weekly CBC, CMP & ESR/CRP every other week fax reports to [phone], called with critical labs at [phone]  Encourage adequate fluids, daily probiotic/yogurt             Patient seen today on hospital follow-up. This is a virtual visit. Patient niece , Montse Hardin is present. Wound care MD also present. She is doing wound care.  Wound appears clean, granulating. Pricilla Flores is a 80year old male with history DM, MS, Stroke who presented on 2/9 with sealock wounds on sacrum and ischium. He has bilaterally with  fever. Patient is a resident of SNF, found to have fever and tachycardia on morning of admission with worsening of his chronic wounds to sacrum, heels. Patient sating 90% on room air, placed on o2. Per ED note he was  oriented to self, stable neuro exam at time of admission. Patient tachycardic to 126, /70, fecbrile to 101.6 en route. Unknown vaccination status. Patient reports pain to his bottom which has been worsening over the past week approximately. When speaking to his niece Birdget who is NP with orthopedics, I have learned that the best been dealing with chronic sacral wounds, sacral osteomyelitis. Usually receives treatment at the [address] [place] [address]. Patient has developed newly skin wounds bilaterally which have been progressing over the past month. Patient has diagnosis of chronic  osteomyelitis of sacrum, recurrent UTI, pneumonia. Right ankle wound has been present for 1 year. Patient is being treated for -Chronic OM of sacrococcygeal junction,acute OM ischial tuberosities  Wound cultures-2/9+ for light P.mirabilis, light strep Gp B beta hemolytic, light alpha strep, moderate Bacteroides uniformis, beta lactamase negative. Bacteremia with -Bacteremia with strep agalactiae sero Gp B Strep 1/2 ,P.mirabilis 2nd of 2, possible mitral valve vegetation  Patient Active Problem List   Diagnosis Code    Sepsis ([facility] A41.9    Sacral pressure ulcer L89.159    Frailty R54    Acute pain R52    Physical debility R53.81    Anorexia R63.0    Palliative care by specialist Z51.5    Fatigue, unspecified type R53.83     Past Medical History:   Diagnosis Date    MS (multiple sclerosis) ([facility]     Stroke ([facility]       History reviewed. No pertinent family history.    Social History     Tobacco Use    Smoking status: Never Smoker    Smokeless tobacco: Never Used Substance Use Topics    Alcohol use: No     Past Surgical History:   Procedure Laterality Date    HX CORONARY STENT PLACEMENT      x7       Prior to Admission medications    Medication Sig Start Date End Date Taking? Authorizing Provider   ampicillin-sulbactam 3 gram 3 g IVPB 3 g by IntraVENous route every six (6) hours for 142 doses. 2/18/22 3/26/22 Yes Bozena Branch MD   acetaminophen (TYLENOL) 500 mg tablet Take 1,000 mg by mouth every eight (8) hours as needed (moderate pain). Yes Provider, Historical   ascorbic acid, vitamin C, (VITAMIN C) 500 mg tablet Take 500 mg by mouth daily. Yes Provider, Historical   aspirin delayed-release 81 mg tablet Take 81 mg by mouth daily. Yes Provider, Historical   bisacodyL (DULCOLAX) 10 mg supp Insert 10 mg into rectum daily as needed for Constipation. Yes Provider, Historical   cholecalciferol (VITAMIN D3) (2,000 UNITS /50 MCG) cap capsule Take 2,000 Units by mouth daily. Yes Provider, Historical   cyanocobalamin (VITAMIN B12) 100 mcg tablet Take 200 mcg by mouth daily. Yes Provider, Historical   docusate sodium (COLACE) 100 mg capsule Take 100 mg by mouth nightly. Yes Provider, Historical   ferrous sulfate 325 mg (65 mg iron) tablet Take 325 mg by mouth every other day. Yes Provider, Historical   folic acid (FOLVITE) 1 mg tablet Take 2 mg by mouth daily. Yes Provider, Historical   glatiramer (COPAXONE) 40 mg/mL syrg 40 mg by SubCUTAneous route every Monday, Wednesday, Friday. Yes Provider, Historical   guaiFENesin (ORGANIDIN) 200 mg tablet Take 400 mg by mouth every eight (8) hours as needed for Congestion. Yes Provider, Historical   albuterol-ipratropium (DUO-NEB) 2.5 mg-0.5 mg/3 ml nebu 3 mL by Nebulization route every four (4) hours as needed for Wheezing or Shortness of Breath. Yes Provider, Historical   lidocaine-vit e-aloe vera-wayne 2 % topical gel Apply 1 Each to affected area Every Thursday.  Apply to ischiums and right ankle prior to wound care visit   Yes Provider, Historical   lidocaine-vit e-aloe vera-wayne 2 % topical gel Apply 1 Each to affected area Every Thursday. Apply to sacrum prior to wound care   Yes Provider, Historical   dronabinoL (MARINOL) 2.5 mg capsule Take 2.5 mg by mouth two (2) times a day. Yes Provider, Historical   mirtazapine (REMERON) 30 mg tablet Take 30 mg by mouth nightly. Yes Provider, Historical   insulin aspart U-100 (NOVOLOG) 100 unit/mL injection by SubCUTAneous route Before breakfast, lunch, and dinner. 150-200 = 2 units  201-250 = 4 units  251-300 = 5 units  301-350 = 7 units  351-400 = 9 units  401+ = 10 units   Yes Provider, Historical   omeprazole (PRILOSEC) 40 mg capsule Take 40 mg by mouth daily. Yes Provider, Historical   senna (SENOKOT) 8.6 mg tablet Take 2 Tablets by mouth nightly. Yes Provider, Historical   sertraline (ZOLOFT) 25 mg tablet Take 25 mg by mouth daily. Yes Provider, Historical   zinc sulfate (Zinc-220) 50 mg zinc (220 mg) capsule Take 1 Capsule by mouth every other day. Yes Provider, Historical     Allergies   Allergen Reactions    Beeswax Anaphylaxis    Bee Pollen Unknown (comments)    Simvastatin Not Reported This Time    Tolterodine Not Reported This Time    Venom-Wasp Not Reported This Time        Review of Systems:  A comprehensive review of systems was negative except for that written in the History of Present Illness. 14 point review of systems obtained . All other systems negative    Objective: There were no vitals taken for this visit. Temp (24hrs), Av.1 °F (36.7 °C), Min:97.8 °F (36.6 °C), Max:98.4 °F (36.9 °C)    Current Outpatient Medications   Medication Sig    ampicillin-sulbactam 3 gram 3 g IVPB 3 g by IntraVENous route every six (6) hours for 142 doses.  acetaminophen (TYLENOL) 500 mg tablet Take 1,000 mg by mouth every eight (8) hours as needed (moderate pain).  ascorbic acid, vitamin C, (VITAMIN C) 500 mg tablet Take 500 mg by mouth daily.     aspirin delayed-release 81 mg tablet Take 81 mg by mouth daily.  bisacodyL (DULCOLAX) 10 mg supp Insert 10 mg into rectum daily as needed for Constipation.  cholecalciferol (VITAMIN D3) (2,000 UNITS /50 MCG) cap capsule Take 2,000 Units by mouth daily.  cyanocobalamin (VITAMIN B12) 100 mcg tablet Take 200 mcg by mouth daily.  docusate sodium (COLACE) 100 mg capsule Take 100 mg by mouth nightly.  ferrous sulfate 325 mg (65 mg iron) tablet Take 325 mg by mouth every other day.  folic acid (FOLVITE) 1 mg tablet Take 2 mg by mouth daily.  glatiramer (COPAXONE) 40 mg/mL syrg 40 mg by SubCUTAneous route every Monday, Wednesday, Friday.  guaiFENesin (ORGANIDIN) 200 mg tablet Take 400 mg by mouth every eight (8) hours as needed for Congestion.  albuterol-ipratropium (DUO-NEB) 2.5 mg-0.5 mg/3 ml nebu 3 mL by Nebulization route every four (4) hours as needed for Wheezing or Shortness of Breath.  lidocaine-vit e-aloe vera-wayne 2 % topical gel Apply 1 Each to affected area Every Thursday. Apply to ischiums and right ankle prior to wound care visit    lidocaine-vit e-aloe vera-wayne 2 % topical gel Apply 1 Each to affected area Every Thursday. Apply to sacrum prior to wound care    dronabinoL (MARINOL) 2.5 mg capsule Take 2.5 mg by mouth two (2) times a day.  mirtazapine (REMERON) 30 mg tablet Take 30 mg by mouth nightly.  insulin aspart U-100 (NOVOLOG) 100 unit/mL injection by SubCUTAneous route Before breakfast, lunch, and dinner. 150-200 = 2 units  201-250 = 4 units  251-300 = 5 units  301-350 = 7 units  351-400 = 9 units  401+ = 10 units    omeprazole (PRILOSEC) 40 mg capsule Take 40 mg by mouth daily.  senna (SENOKOT) 8.6 mg tablet Take 2 Tablets by mouth nightly.  sertraline (ZOLOFT) 25 mg tablet Take 25 mg by mouth daily.  zinc sulfate (Zinc-220) 50 mg zinc (220 mg) capsule Take 1 Capsule by mouth every other day.      No current facility-administered medications for this visit.        Exam:    Physical exam:GEN: NAD  NECK- supple  RESP: no distress  NEURO:non focal  Sacral wounds appear clean. Granulating    D/w Wound care Doc          Data Reviewed:     Lab Results   Component Value Date/Time    Culture result: NO GROWTH 5 DAYS 02/12/2022 03:42 AM    Culture result: NO GROWTH 5 DAYS 02/11/2022 11:04 AM    Culture result: LIGHT PROTEUS MIRABILIS (A) 02/10/2022 04:00 AM    Culture result: (A) 02/10/2022 04:00 AM     LIGHT STREPTOCOCCI, BETA HEMOLYTIC GROUP B Penicillin and ampicillin are drugs of choice for treatment of beta-hemolytic streptococcal infections. Susceptibility testing of penicillins and beta-lactams approved by the FDA for treatment of beta-hemolytic streptococcal infections need not be performed routinely, because nonsusceptible isolates are extremely rare. CLSI 2012    Culture result: LIGHT ALPHA STREPTOCOCCUS (A) 02/10/2022 04:00 AM    Culture result: (A) 02/10/2022 04:00 AM     MODERATE BACTEROIDES UNIFORMIS BETA LACTAMASE NEGATIVE          XR Results (most recent)reviewed:  Results from [place] encounter on 02/09/22    XR CHEST PORT    Narrative  EXAM:  XR CHEST PORT    INDICATION: Eval for infiltrate    COMPARISON: Chest radiographs 7/20/2016    TECHNIQUE: Semiupright portable chest AP view    FINDINGS:    Cardiac silhouette within normal limits. Aorta is tortuous. Several irregular  linear opacities in the right mid and upper lung, again may reflect fibrosis. No  focal consolidation. No definite pleural effusion or pneumothorax. Impression  No consolidative pneumonia. Redemonstrated suspected right lung  fibrotic changes. Antibiotic History  Ceftriaxone, Flagyl  S/p vancomycin, cefepime, Flagyl   I have discussed the diagnosis with the patient and niece & the intended plan as seen in the above orders. I have discussed medication side effects and warnings with the patient & niece.     Reviewed test results at length with patient & niece.    Meg Parry By: Arlene Doherty MD FACP

## 2022-03-24 NOTE — PROGRESS NOTES
Ranjan Nayak (: 1950) is a 80 y.o. male, established patient, here for evaluation of the following chief complaint(s):   Follow-up ([phone])     Pt wound care happens around [facility], was evaluated through a synchronous (real-time) audio-video encounter. The patient (or guardian if applicable) is aware that this is a billable service, which includes applicable co-pays. Verbal consent to proceed has been obtained. The visit was conducted pursuant to the emergency declaration under the [facility], [address] [place] [address] [facility] authority and the [employer] and [place] [employer] Act. Patient identification was verified, and a caregiver was present when appropriate. The patient was located at home in a state where the provider was licensed to provide care. 1. Have you been to the ER, urgent care clinic since your last visit? Hospitalized since your last visit? Pt in nursing facilty     2. Have you seen or consulted any other health care providers outside of the [address] [place] [address] since your last visit? Include any pap smears or colon screening. Yes [facility], see pharmacy for details         An electronic signature was used to authenticate this note.   -- Panfilo Coil

## 2024-02-21 NOTE — PROGRESS NOTES
0715: Bedside shift change report given to Liz RN (oncoming nurse) by Wendi Muñoz RN (offgoing nurse). Report included the following information SBAR, Intake/Output, MAR, Recent Results and Cardiac Rhythm Aifb. Dual skin performed, see flowsheets. complains of pain/discomfort